# Patient Record
Sex: FEMALE | Race: WHITE | Employment: OTHER | URBAN - METROPOLITAN AREA
[De-identification: names, ages, dates, MRNs, and addresses within clinical notes are randomized per-mention and may not be internally consistent; named-entity substitution may affect disease eponyms.]

---

## 2017-01-20 RX ORDER — TRAMADOL HYDROCHLORIDE 50 MG/1
TABLET ORAL
Qty: 100 TABLET | Refills: 1 | Status: SHIPPED | OUTPATIENT
Start: 2017-01-20 | End: 2017-04-02 | Stop reason: SDUPTHER

## 2017-01-31 ENCOUNTER — TELEPHONE (OUTPATIENT)
Dept: FAMILY MEDICINE CLINIC | Age: 82
End: 2017-01-31

## 2017-01-31 DIAGNOSIS — K21.9 GASTROESOPHAGEAL REFLUX DISEASE WITHOUT ESOPHAGITIS: Primary | ICD-10-CM

## 2017-02-01 RX ORDER — OMEPRAZOLE 40 MG/1
40 CAPSULE, DELAYED RELEASE ORAL DAILY
Qty: 90 CAPSULE | Refills: 2 | Status: SHIPPED | OUTPATIENT
Start: 2017-02-01 | End: 2017-11-07 | Stop reason: SDUPTHER

## 2017-02-23 DIAGNOSIS — E03.4 HYPOTHYROIDISM DUE TO ACQUIRED ATROPHY OF THYROID: ICD-10-CM

## 2017-02-25 RX ORDER — LEVOTHYROXINE SODIUM 0.05 MG/1
TABLET ORAL
Qty: 90 TABLET | Refills: 1 | Status: SHIPPED | OUTPATIENT
Start: 2017-02-25 | End: 2017-08-22 | Stop reason: SDUPTHER

## 2017-03-13 ENCOUNTER — OFFICE VISIT (OUTPATIENT)
Dept: FAMILY MEDICINE CLINIC | Age: 82
End: 2017-03-13

## 2017-03-13 VITALS
WEIGHT: 110.13 LBS | SYSTOLIC BLOOD PRESSURE: 130 MMHG | RESPIRATION RATE: 12 BRPM | HEART RATE: 76 BPM | BODY MASS INDEX: 19.51 KG/M2 | DIASTOLIC BLOOD PRESSURE: 74 MMHG | HEIGHT: 63 IN

## 2017-03-13 DIAGNOSIS — G60.9 IDIOPATHIC PERIPHERAL NEUROPATHY: ICD-10-CM

## 2017-03-13 DIAGNOSIS — E03.4 HYPOTHYROIDISM DUE TO ACQUIRED ATROPHY OF THYROID: ICD-10-CM

## 2017-03-13 DIAGNOSIS — N31.9 NEUROGENIC BLADDER DISORDER: ICD-10-CM

## 2017-03-13 DIAGNOSIS — M75.51 ACUTE SHOULDER BURSITIS, RIGHT: ICD-10-CM

## 2017-03-13 DIAGNOSIS — K21.9 GASTROESOPHAGEAL REFLUX DISEASE WITHOUT ESOPHAGITIS: Primary | ICD-10-CM

## 2017-03-13 PROCEDURE — 99213 OFFICE O/P EST LOW 20 MIN: CPT | Performed by: FAMILY MEDICINE

## 2017-03-13 PROCEDURE — 20610 DRAIN/INJ JOINT/BURSA W/O US: CPT | Performed by: FAMILY MEDICINE

## 2017-03-13 RX ORDER — LORAZEPAM 0.5 MG/1
0.5 TABLET ORAL 2 TIMES DAILY
Qty: 60 TABLET | Refills: 3 | Status: SHIPPED | OUTPATIENT
Start: 2017-03-13 | End: 2017-06-13

## 2017-03-13 ASSESSMENT — ENCOUNTER SYMPTOMS
NAUSEA: 0
SORE THROAT: 0
WHEEZING: 0
SHORTNESS OF BREATH: 0
CHEST TIGHTNESS: 0
CONSTIPATION: 0
ABDOMINAL PAIN: 0
COUGH: 0
EYE PAIN: 0

## 2017-03-13 ASSESSMENT — PATIENT HEALTH QUESTIONNAIRE - PHQ9
SUM OF ALL RESPONSES TO PHQ QUESTIONS 1-9: 0
SUM OF ALL RESPONSES TO PHQ9 QUESTIONS 1 & 2: 0
1. LITTLE INTEREST OR PLEASURE IN DOING THINGS: 0
2. FEELING DOWN, DEPRESSED OR HOPELESS: 0

## 2017-04-04 RX ORDER — TRAMADOL HYDROCHLORIDE 50 MG/1
TABLET ORAL
Qty: 100 TABLET | Refills: 1 | Status: SHIPPED | OUTPATIENT
Start: 2017-04-04 | End: 2017-06-05 | Stop reason: SDUPTHER

## 2017-04-07 DIAGNOSIS — N31.9 NEUROGENIC BLADDER DISORDER: ICD-10-CM

## 2017-04-13 DIAGNOSIS — M15.9 PRIMARY OSTEOARTHRITIS INVOLVING MULTIPLE JOINTS: ICD-10-CM

## 2017-04-14 RX ORDER — CELECOXIB 200 MG/1
CAPSULE ORAL
Qty: 90 CAPSULE | Refills: 1 | Status: SHIPPED | OUTPATIENT
Start: 2017-04-14 | End: 2017-11-07 | Stop reason: SDUPTHER

## 2017-05-01 RX ORDER — LORAZEPAM 0.5 MG/1
TABLET ORAL
Qty: 120 TABLET | Refills: 1 | Status: SHIPPED | OUTPATIENT
Start: 2017-05-01 | End: 2017-08-07 | Stop reason: SDUPTHER

## 2017-05-22 ENCOUNTER — TELEPHONE (OUTPATIENT)
Dept: FAMILY MEDICINE CLINIC | Age: 82
End: 2017-05-22

## 2017-06-05 DIAGNOSIS — F41.9 ANXIETY: ICD-10-CM

## 2017-06-06 RX ORDER — TRAMADOL HYDROCHLORIDE 50 MG/1
TABLET ORAL
Qty: 100 TABLET | Refills: 1 | Status: SHIPPED | OUTPATIENT
Start: 2017-06-06 | End: 2017-08-07 | Stop reason: SDUPTHER

## 2017-06-06 RX ORDER — CITALOPRAM 40 MG/1
20 TABLET ORAL DAILY
Qty: 45 TABLET | Refills: 1 | Status: SHIPPED | OUTPATIENT
Start: 2017-06-06 | End: 2017-11-07 | Stop reason: SDUPTHER

## 2017-06-13 ENCOUNTER — OFFICE VISIT (OUTPATIENT)
Dept: FAMILY MEDICINE CLINIC | Age: 82
End: 2017-06-13

## 2017-06-13 VITALS
DIASTOLIC BLOOD PRESSURE: 66 MMHG | BODY MASS INDEX: 19.87 KG/M2 | HEART RATE: 84 BPM | RESPIRATION RATE: 16 BRPM | WEIGHT: 112.13 LBS | SYSTOLIC BLOOD PRESSURE: 120 MMHG | HEIGHT: 63 IN

## 2017-06-13 DIAGNOSIS — E55.9 VITAMIN D DEFICIENCY: ICD-10-CM

## 2017-06-13 DIAGNOSIS — E03.4 HYPOTHYROIDISM DUE TO ACQUIRED ATROPHY OF THYROID: ICD-10-CM

## 2017-06-13 DIAGNOSIS — K21.9 GASTROESOPHAGEAL REFLUX DISEASE WITHOUT ESOPHAGITIS: Primary | ICD-10-CM

## 2017-06-13 DIAGNOSIS — E78.00 PURE HYPERCHOLESTEROLEMIA: ICD-10-CM

## 2017-06-13 DIAGNOSIS — M81.0 OSTEOPOROSIS: ICD-10-CM

## 2017-06-13 PROCEDURE — 99213 OFFICE O/P EST LOW 20 MIN: CPT | Performed by: FAMILY MEDICINE

## 2017-06-13 ASSESSMENT — ENCOUNTER SYMPTOMS
WHEEZING: 0
SHORTNESS OF BREATH: 0
CONSTIPATION: 0
COUGH: 0
SORE THROAT: 0
EYE PAIN: 0
ABDOMINAL PAIN: 0
CHOKING: 0
NAUSEA: 0
CHEST TIGHTNESS: 0

## 2017-06-19 ENCOUNTER — TELEPHONE (OUTPATIENT)
Dept: FAMILY MEDICINE CLINIC | Age: 82
End: 2017-06-19

## 2017-06-26 DIAGNOSIS — G60.9 IDIOPATHIC PERIPHERAL NEUROPATHY: ICD-10-CM

## 2017-06-27 RX ORDER — GABAPENTIN 100 MG/1
CAPSULE ORAL
Qty: 180 CAPSULE | Refills: 1 | Status: SHIPPED | OUTPATIENT
Start: 2017-06-27 | End: 2017-12-21 | Stop reason: DRUGHIGH

## 2017-06-28 ENCOUNTER — OFFICE VISIT (OUTPATIENT)
Dept: FAMILY MEDICINE CLINIC | Age: 82
End: 2017-06-28

## 2017-06-28 VITALS
HEIGHT: 63 IN | DIASTOLIC BLOOD PRESSURE: 82 MMHG | RESPIRATION RATE: 12 BRPM | HEART RATE: 72 BPM | SYSTOLIC BLOOD PRESSURE: 140 MMHG | BODY MASS INDEX: 20.2 KG/M2 | WEIGHT: 114 LBS

## 2017-06-28 DIAGNOSIS — M54.50 ACUTE RIGHT-SIDED LOW BACK PAIN WITHOUT SCIATICA: Primary | ICD-10-CM

## 2017-06-28 PROCEDURE — 99213 OFFICE O/P EST LOW 20 MIN: CPT | Performed by: FAMILY MEDICINE

## 2017-06-28 RX ORDER — TIZANIDINE 4 MG/1
4 TABLET ORAL EVERY 6 HOURS PRN
Qty: 30 TABLET | Refills: 1 | Status: SHIPPED | OUTPATIENT
Start: 2017-06-28 | End: 2017-08-07 | Stop reason: ALTCHOICE

## 2017-06-28 RX ORDER — PREDNISONE 20 MG/1
TABLET ORAL
Qty: 21 TABLET | Refills: 0 | Status: SHIPPED | OUTPATIENT
Start: 2017-06-28 | End: 2017-08-07 | Stop reason: ALTCHOICE

## 2017-06-28 ASSESSMENT — ENCOUNTER SYMPTOMS
SHORTNESS OF BREATH: 0
ABDOMINAL PAIN: 0
NAUSEA: 0
CONSTIPATION: 0
COUGH: 0
SORE THROAT: 0
CHEST TIGHTNESS: 0
WHEEZING: 0
EYE PAIN: 0

## 2017-08-07 ENCOUNTER — OFFICE VISIT (OUTPATIENT)
Dept: FAMILY MEDICINE CLINIC | Age: 82
End: 2017-08-07

## 2017-08-07 VITALS
BODY MASS INDEX: 20.2 KG/M2 | HEIGHT: 63 IN | RESPIRATION RATE: 16 BRPM | DIASTOLIC BLOOD PRESSURE: 50 MMHG | WEIGHT: 114 LBS | SYSTOLIC BLOOD PRESSURE: 100 MMHG | HEART RATE: 64 BPM

## 2017-08-07 DIAGNOSIS — F41.9 ANXIETY: Primary | ICD-10-CM

## 2017-08-07 DIAGNOSIS — S76.011A HIP STRAIN, RIGHT, INITIAL ENCOUNTER: ICD-10-CM

## 2017-08-07 PROCEDURE — 99213 OFFICE O/P EST LOW 20 MIN: CPT | Performed by: FAMILY MEDICINE

## 2017-08-07 RX ORDER — TRAMADOL HYDROCHLORIDE 50 MG/1
TABLET ORAL
Qty: 100 TABLET | Refills: 0 | Status: SHIPPED | OUTPATIENT
Start: 2017-08-07 | End: 2017-08-30 | Stop reason: SDUPTHER

## 2017-08-07 RX ORDER — LORAZEPAM 0.5 MG/1
TABLET ORAL
Qty: 120 TABLET | Refills: 0 | Status: SHIPPED | OUTPATIENT
Start: 2017-08-07 | End: 2017-10-02 | Stop reason: SDUPTHER

## 2017-08-07 ASSESSMENT — ENCOUNTER SYMPTOMS
SINUS PRESSURE: 0
SHORTNESS OF BREATH: 0
BACK PAIN: 1
CONSTIPATION: 0

## 2017-08-14 ENCOUNTER — OFFICE VISIT (OUTPATIENT)
Dept: FAMILY MEDICINE CLINIC | Age: 82
End: 2017-08-14

## 2017-08-14 VITALS
HEART RATE: 72 BPM | WEIGHT: 115.13 LBS | BODY MASS INDEX: 20.4 KG/M2 | RESPIRATION RATE: 12 BRPM | SYSTOLIC BLOOD PRESSURE: 130 MMHG | HEIGHT: 63 IN | DIASTOLIC BLOOD PRESSURE: 72 MMHG

## 2017-08-14 DIAGNOSIS — M25.551 ACUTE RIGHT HIP PAIN: Primary | ICD-10-CM

## 2017-08-14 DIAGNOSIS — M54.50 ACUTE RIGHT-SIDED LOW BACK PAIN WITHOUT SCIATICA: ICD-10-CM

## 2017-08-14 PROCEDURE — 99213 OFFICE O/P EST LOW 20 MIN: CPT | Performed by: FAMILY MEDICINE

## 2017-08-14 RX ORDER — PREDNISONE 20 MG/1
TABLET ORAL
Qty: 21 TABLET | Refills: 0 | Status: SHIPPED | OUTPATIENT
Start: 2017-08-14 | End: 2017-08-15 | Stop reason: SDUPTHER

## 2017-08-14 ASSESSMENT — ENCOUNTER SYMPTOMS
NAUSEA: 0
SORE THROAT: 0
WHEEZING: 0
CHEST TIGHTNESS: 0
BACK PAIN: 1
ABDOMINAL PAIN: 0
COUGH: 0
EYE PAIN: 0
CONSTIPATION: 0
SHORTNESS OF BREATH: 0

## 2017-08-15 DIAGNOSIS — M25.551 ACUTE RIGHT HIP PAIN: ICD-10-CM

## 2017-08-15 DIAGNOSIS — M54.50 ACUTE RIGHT-SIDED LOW BACK PAIN WITHOUT SCIATICA: ICD-10-CM

## 2017-08-16 RX ORDER — PREDNISONE 20 MG/1
TABLET ORAL
Qty: 9 TABLET | Refills: 0 | Status: SHIPPED | OUTPATIENT
Start: 2017-08-16 | End: 2017-11-07

## 2017-08-22 DIAGNOSIS — E03.4 HYPOTHYROIDISM DUE TO ACQUIRED ATROPHY OF THYROID: ICD-10-CM

## 2017-08-23 RX ORDER — LEVOTHYROXINE SODIUM 0.05 MG/1
TABLET ORAL
Qty: 90 TABLET | Refills: 1 | Status: SHIPPED | OUTPATIENT
Start: 2017-08-23 | End: 2018-02-22 | Stop reason: SDUPTHER

## 2017-08-30 ENCOUNTER — HOSPITAL ENCOUNTER (OUTPATIENT)
Dept: WOMENS IMAGING | Age: 82
Discharge: HOME | End: 2017-08-30
Payer: MEDICARE

## 2017-08-30 DIAGNOSIS — Z12.31 VISIT FOR SCREENING MAMMOGRAM: ICD-10-CM

## 2017-08-30 DIAGNOSIS — S76.011A HIP STRAIN, RIGHT, INITIAL ENCOUNTER: ICD-10-CM

## 2017-08-30 PROCEDURE — G0202 SCR MAMMO BI INCL CAD: HCPCS

## 2017-08-31 RX ORDER — TRAMADOL HYDROCHLORIDE 50 MG/1
TABLET ORAL
Qty: 100 TABLET | Refills: 0 | Status: SHIPPED | OUTPATIENT
Start: 2017-08-31 | End: 2017-10-02 | Stop reason: SDUPTHER

## 2017-09-07 ENCOUNTER — HOSPITAL ENCOUNTER (OUTPATIENT)
Dept: WOMENS IMAGING | Age: 82
End: 2017-09-07
Payer: MEDICARE

## 2017-09-07 ENCOUNTER — HOSPITAL ENCOUNTER (OUTPATIENT)
Dept: WOMENS IMAGING | Age: 82
Discharge: HOME | End: 2017-09-07
Payer: MEDICARE

## 2017-09-07 DIAGNOSIS — R92.2 BREAST DENSITY: ICD-10-CM

## 2017-09-07 PROCEDURE — G0206 DX MAMMO INCL CAD UNI: HCPCS

## 2017-09-12 ENCOUNTER — HOSPITAL ENCOUNTER (OUTPATIENT)
Age: 82
Discharge: HOME | End: 2017-09-12
Payer: MEDICARE

## 2017-09-12 ENCOUNTER — OFFICE VISIT (OUTPATIENT)
Dept: FAMILY MEDICINE CLINIC | Age: 82
End: 2017-09-12

## 2017-09-12 ENCOUNTER — HOSPITAL ENCOUNTER (OUTPATIENT)
Dept: GENERAL RADIOLOGY | Age: 82
Discharge: HOME | End: 2017-09-12
Payer: MEDICARE

## 2017-09-12 VITALS
SYSTOLIC BLOOD PRESSURE: 130 MMHG | HEART RATE: 68 BPM | RESPIRATION RATE: 16 BRPM | HEIGHT: 63 IN | BODY MASS INDEX: 20.91 KG/M2 | DIASTOLIC BLOOD PRESSURE: 70 MMHG | WEIGHT: 118 LBS

## 2017-09-12 DIAGNOSIS — M70.61 TROCHANTERIC BURSITIS OF RIGHT HIP: Primary | ICD-10-CM

## 2017-09-12 DIAGNOSIS — M70.61 TROCHANTERIC BURSITIS OF RIGHT HIP: ICD-10-CM

## 2017-09-12 PROCEDURE — 99213 OFFICE O/P EST LOW 20 MIN: CPT | Performed by: FAMILY MEDICINE

## 2017-09-12 PROCEDURE — 73502 X-RAY EXAM HIP UNI 2-3 VIEWS: CPT

## 2017-09-12 RX ORDER — PREDNISONE 20 MG/1
TABLET ORAL
Qty: 15 TABLET | Refills: 0 | Status: SHIPPED | OUTPATIENT
Start: 2017-09-12 | End: 2017-11-07

## 2017-09-12 ASSESSMENT — ENCOUNTER SYMPTOMS
NAUSEA: 0
EYE PAIN: 0
CHEST TIGHTNESS: 0
WHEEZING: 0
SORE THROAT: 0
COUGH: 0
SHORTNESS OF BREATH: 0
ABDOMINAL PAIN: 0
CONSTIPATION: 0
BACK PAIN: 1

## 2017-09-13 ENCOUNTER — TELEPHONE (OUTPATIENT)
Dept: FAMILY MEDICINE CLINIC | Age: 82
End: 2017-09-13

## 2017-10-02 DIAGNOSIS — F41.9 ANXIETY: ICD-10-CM

## 2017-10-02 DIAGNOSIS — S76.011A HIP STRAIN, RIGHT, INITIAL ENCOUNTER: ICD-10-CM

## 2017-10-02 NOTE — TELEPHONE ENCOUNTER
Pt called for a refill of:    LORazepam (ATIVAN) 0.5 MG tablet tid  traMADol (ULTRAM) 50 MG tablet take 1 to 2 tablets by mouth every 6 hours if needed for pain    Send 90 day supply to [facility]

## 2017-10-03 RX ORDER — TRAMADOL HYDROCHLORIDE 50 MG/1
TABLET ORAL
Qty: 100 TABLET | Refills: 0 | Status: SHIPPED | OUTPATIENT
Start: 2017-10-03 | End: 2017-10-24 | Stop reason: SDUPTHER

## 2017-10-03 RX ORDER — LORAZEPAM 0.5 MG/1
TABLET ORAL
Qty: 120 TABLET | Refills: 0 | Status: SHIPPED | OUTPATIENT
Start: 2017-10-03 | End: 2017-12-18 | Stop reason: SDUPTHER

## 2017-10-24 DIAGNOSIS — S76.011A HIP STRAIN, RIGHT, INITIAL ENCOUNTER: ICD-10-CM

## 2017-10-24 NOTE — TELEPHONE ENCOUNTER
Pt called requesting a Rx for Tramadol 50 mg take 1 - 2 tablets every 6 hours    [facility])

## 2017-10-25 RX ORDER — TRAMADOL HYDROCHLORIDE 50 MG/1
TABLET ORAL
Qty: 100 TABLET | Refills: 0 | Status: SHIPPED | OUTPATIENT
Start: 2017-10-25 | End: 2017-11-20 | Stop reason: SDUPTHER

## 2017-11-07 ENCOUNTER — OFFICE VISIT (OUTPATIENT)
Dept: FAMILY MEDICINE CLINIC | Age: 82
End: 2017-11-07

## 2017-11-07 VITALS
DIASTOLIC BLOOD PRESSURE: 66 MMHG | WEIGHT: 111.13 LBS | HEIGHT: 63 IN | BODY MASS INDEX: 19.69 KG/M2 | HEART RATE: 68 BPM | RESPIRATION RATE: 14 BRPM | SYSTOLIC BLOOD PRESSURE: 110 MMHG

## 2017-11-07 DIAGNOSIS — Z23 NEED FOR INFLUENZA VACCINATION: ICD-10-CM

## 2017-11-07 DIAGNOSIS — M70.61 TROCHANTERIC BURSITIS OF RIGHT HIP: ICD-10-CM

## 2017-11-07 DIAGNOSIS — G89.29 CHRONIC MIDLINE LOW BACK PAIN WITH RIGHT-SIDED SCIATICA: ICD-10-CM

## 2017-11-07 DIAGNOSIS — M81.0 AGE-RELATED OSTEOPOROSIS WITHOUT CURRENT PATHOLOGICAL FRACTURE: ICD-10-CM

## 2017-11-07 DIAGNOSIS — E03.4 HYPOTHYROIDISM DUE TO ACQUIRED ATROPHY OF THYROID: ICD-10-CM

## 2017-11-07 DIAGNOSIS — F41.9 ANXIETY: ICD-10-CM

## 2017-11-07 DIAGNOSIS — K21.9 GASTROESOPHAGEAL REFLUX DISEASE WITHOUT ESOPHAGITIS: Primary | ICD-10-CM

## 2017-11-07 DIAGNOSIS — M54.41 CHRONIC MIDLINE LOW BACK PAIN WITH RIGHT-SIDED SCIATICA: ICD-10-CM

## 2017-11-07 DIAGNOSIS — M15.9 PRIMARY OSTEOARTHRITIS INVOLVING MULTIPLE JOINTS: ICD-10-CM

## 2017-11-07 DIAGNOSIS — E55.9 VITAMIN D DEFICIENCY: ICD-10-CM

## 2017-11-07 PROCEDURE — G0008 ADMIN INFLUENZA VIRUS VAC: HCPCS | Performed by: FAMILY MEDICINE

## 2017-11-07 PROCEDURE — 99213 OFFICE O/P EST LOW 20 MIN: CPT | Performed by: FAMILY MEDICINE

## 2017-11-07 RX ORDER — CITALOPRAM 40 MG/1
20 TABLET ORAL DAILY
Qty: 45 TABLET | Refills: 1 | Status: SHIPPED | OUTPATIENT
Start: 2017-11-07 | End: 2018-05-31 | Stop reason: SDUPTHER

## 2017-11-07 RX ORDER — CELECOXIB 200 MG/1
CAPSULE ORAL
Qty: 90 CAPSULE | Refills: 1 | Status: SHIPPED | OUTPATIENT
Start: 2017-11-07 | End: 2017-12-21 | Stop reason: ALTCHOICE

## 2017-11-07 RX ORDER — OMEPRAZOLE 40 MG/1
40 CAPSULE, DELAYED RELEASE ORAL DAILY
Qty: 90 CAPSULE | Refills: 1 | Status: SHIPPED | OUTPATIENT
Start: 2017-11-07 | End: 2018-04-03

## 2017-11-07 ASSESSMENT — ENCOUNTER SYMPTOMS
ABDOMINAL PAIN: 0
NAUSEA: 0
CHEST TIGHTNESS: 0
CONSTIPATION: 0
SORE THROAT: 0
EYE PAIN: 0
SHORTNESS OF BREATH: 0
WHEEZING: 0
COUGH: 0

## 2017-11-07 ASSESSMENT — PATIENT HEALTH QUESTIONNAIRE - PHQ9
SUM OF ALL RESPONSES TO PHQ9 QUESTIONS 1 & 2: 0
SUM OF ALL RESPONSES TO PHQ QUESTIONS 1-9: 0
1. LITTLE INTEREST OR PLEASURE IN DOING THINGS: 0
2. FEELING DOWN, DEPRESSED OR HOPELESS: 0

## 2017-11-07 NOTE — PROGRESS NOTES
Subjective:      Patient ID: Jeannie Kayser is a 80 y.o. female. Back and  Hip  Pain and  Hip  Trochanteric    Bursitis   Tramadol  2  In am and oneafternoon and  One  bedtiome       Osteoporosis  prolia      bladder  Stable                Hyperlipidemia   This is a chronic problem. The current episode started more than 1 year ago. The problem is controlled. Pertinent negatives include no chest pain or shortness of breath. Current antihyperlipidemic treatment includes statins. The current treatment provides significant improvement of lipids. There are no compliance problems. Gastroesophageal Reflux   She reports no abdominal pain, no chest pain, no coughing, no early satiety, no nausea, no sore throat or no wheezing. This is a chronic problem. The problem occurs rarely. The problem has been resolved. Nothing aggravates the symptoms. Pertinent negatives include no fatigue. The treatment provided significant relief. Past Medical History:   Diagnosis Date    Alcohol abuse sober since Dorsey Eisenmenger polyp Dr. Bryon Meres    Depression     GERD (gastroesophageal reflux disease)     PUD 1998 Dr. Mansi Singh    Hyperlipidemia     Hypothyroid     Neuropathy, peripheral ([address]     Osteoarthritis     Osteoporosis     Thyroid disease     hypothyroid    Vitamin D deficiency      Review of Systems   Constitutional: Negative for appetite change, fatigue and fever. HENT: Negative for congestion, ear pain, postnasal drip and sore throat. Eyes: Negative for pain and visual disturbance. Respiratory: Negative for cough, chest tightness, shortness of breath and wheezing. Cardiovascular: Negative for chest pain, palpitations and leg swelling. Gastrointestinal: Negative for abdominal pain, constipation and nausea. Genitourinary: Negative for dysuria and frequency. Musculoskeletal: Negative for arthralgias, joint swelling, neck pain and neck stiffness. Skin: Negative for rash. Neurological: Negative for dizziness, weakness, numbness and headaches. Hematological: Negative for adenopathy. Does not bruise/bleed easily. Psychiatric/Behavioral: Negative for behavioral problems and sleep disturbance. The patient is not nervous/anxious. /66 (Site: Right Arm, Position: Sitting, Cuff Size: Medium Adult)   Pulse 68   Resp 14   Ht 5' 3\" (1.6 m)   Wt 111 lb 2 oz (50.4 kg)   Breastfeeding? No   BMI 19.68 kg/m²   Objective:   Physical Exam   Constitutional: She is oriented to person, place, and time. She appears well-developed and well-nourished. HENT:   Head: Normocephalic and atraumatic. Right Ear: External ear normal.   Left Ear: External ear normal.   Nose: Nose normal.   Mouth/Throat: Oropharynx is clear and moist.   Eyes: Conjunctivae and EOM are normal. Pupils are equal, round, and reactive to light. No scleral icterus. Neck: Normal range of motion. Neck supple. No JVD present. No thyromegaly present. Cardiovascular: Normal rate, regular rhythm, normal heart sounds and intact distal pulses. Pulmonary/Chest: Effort normal and breath sounds normal. She has no wheezes. She has no rales. Abdominal: Soft. Bowel sounds are normal. She exhibits no distension and no mass. There is no tenderness. Musculoskeletal: Normal range of motion. She exhibits no tenderness. Low  Back and    right hip pain   Lymphadenopathy:     She has no cervical adenopathy. Neurological: She is alert and oriented to person, place, and time. She has normal reflexes. No cranial nerve deficit. Skin: Skin is warm and dry. No rash noted. Psychiatric: She has a normal mood and affect. Nursing note and vitals reviewed. Assessment:      1. Gastroesophageal reflux disease without esophagitis  omeprazole (PRILOSEC) 40 MG delayed release capsule   2. Primary osteoarthritis involving multiple joints  celecoxib (CELEBREX) 200 MG capsule   3. Anxiety  citalopram (CELEXA) 40 MG tablet   4.

## 2017-11-16 ENCOUNTER — TELEPHONE (OUTPATIENT)
Dept: FAMILY MEDICINE CLINIC | Age: 82
End: 2017-11-16

## 2017-11-16 DIAGNOSIS — M81.0 AGE-RELATED OSTEOPOROSIS WITHOUT CURRENT PATHOLOGICAL FRACTURE: ICD-10-CM

## 2017-11-16 NOTE — TELEPHONE ENCOUNTER
Patient called stating Dr Winnie Miller ordered Prolia and advised her we would contact her when this was scheduled at [facility]. She is unable to do it on 11/21/17.

## 2017-11-20 ENCOUNTER — HOSPITAL ENCOUNTER (OUTPATIENT)
Dept: NURSING | Age: 82
Discharge: HOME | End: 2017-11-20
Payer: MEDICARE

## 2017-11-20 VITALS
SYSTOLIC BLOOD PRESSURE: 130 MMHG | DIASTOLIC BLOOD PRESSURE: 75 MMHG | RESPIRATION RATE: 16 BRPM | TEMPERATURE: 96.4 F | HEART RATE: 80 BPM

## 2017-11-20 DIAGNOSIS — G89.29 CHRONIC MIDLINE LOW BACK PAIN WITHOUT SCIATICA: ICD-10-CM

## 2017-11-20 DIAGNOSIS — M54.50 CHRONIC MIDLINE LOW BACK PAIN WITHOUT SCIATICA: ICD-10-CM

## 2017-11-20 DIAGNOSIS — S76.011A HIP STRAIN, RIGHT, INITIAL ENCOUNTER: ICD-10-CM

## 2017-11-20 DIAGNOSIS — M15.9 PRIMARY OSTEOARTHRITIS INVOLVING MULTIPLE JOINTS: Primary | ICD-10-CM

## 2017-11-20 DIAGNOSIS — M81.0 AGE-RELATED OSTEOPOROSIS WITHOUT CURRENT PATHOLOGICAL FRACTURE: ICD-10-CM

## 2017-11-20 NOTE — PROGRESS NOTES
11:12 AM PT ADMITTED TO [facility] FOR PROLIA INJECTION  11:12 AM PATIENT RIGHTS AND RESPONSIBILITIES SHEET OFFERED TO PT TO READ.  11:22 AM TOO EARLY FOR PROLIA INJECTION-PT RESCHEDULED FOR 11-27-17  11:22 AM WRITTEN DISCHARGE INSTRUCTIONS GIVEN TO PT-VERBALIZES UNDERSTANDING.  11:22 AM PT DISCHARGED AMBULATORY IN SATISFACTORY CONDITION.     _M___ Safety:       (Environmental)   [place] to environment   Ensure ID band is correct and in place/ allergy band as needed   Assess for fall risk   Initiate fall precautions as applicable (fall band, side rails, etc.)   Call light within reach   Bed in low position/ wheels locked    M____ Pain:        Assess pain level and characteristics   Administer analgesics as ordered   Assess effectiveness of pain management and report to MD as needed    _M___ Knowledge Deficit:   Assess baseline knowledge   Provide teaching at level of understanding   Provide teaching via preferred learning method   Evaluate teaching effectiveness

## 2017-11-21 PROBLEM — M54.50 CHRONIC MIDLINE LOW BACK PAIN WITHOUT SCIATICA: Status: ACTIVE | Noted: 2017-11-21

## 2017-11-21 PROBLEM — G89.29 CHRONIC MIDLINE LOW BACK PAIN WITHOUT SCIATICA: Status: ACTIVE | Noted: 2017-11-21

## 2017-11-21 RX ORDER — TRAMADOL HYDROCHLORIDE 50 MG/1
TABLET ORAL
Qty: 100 TABLET | Refills: 0 | Status: ON HOLD | OUTPATIENT
Start: 2017-11-21 | End: 2017-12-07 | Stop reason: HOSPADM

## 2017-11-27 ENCOUNTER — HOSPITAL ENCOUNTER (OUTPATIENT)
Dept: NURSING | Age: 82
Discharge: HOME | End: 2017-11-27
Payer: MEDICARE

## 2017-11-27 VITALS
SYSTOLIC BLOOD PRESSURE: 138 MMHG | HEART RATE: 72 BPM | RESPIRATION RATE: 16 BRPM | DIASTOLIC BLOOD PRESSURE: 66 MMHG | TEMPERATURE: 96.9 F

## 2017-11-27 DIAGNOSIS — M81.0 AGE-RELATED OSTEOPOROSIS WITHOUT CURRENT PATHOLOGICAL FRACTURE: ICD-10-CM

## 2017-11-27 PROCEDURE — 96372 THER/PROPH/DIAG INJ SC/IM: CPT

## 2017-11-27 PROCEDURE — 6360000002 HC RX W HCPCS: Performed by: FAMILY MEDICINE

## 2017-11-27 RX ADMIN — DENOSUMAB 60 MG: 60 INJECTION SUBCUTANEOUS at 11:03

## 2017-11-27 NOTE — PROGRESS NOTES
10:44 AM PT ADMITTED TO [facility] FOR PROLIA INJECTION  10:44 AM PATIENT RIGHTS AND RESPONSIBILITIES SHEET OFFERED TO PT TO READ.   11:05 AM PT TOLERATED INJECTION WELL  11:05 AM WRITTEN DISCHARGE INSTRUCTIONS GIVEN TO PT-VERBALIZES UNDERSTANDING  11:05 AM PT DISCHARGED AMBULATORY IN SATISFACTORY CONDITION.    __M__ Safety:       (Environmental)   [place] to environment   Ensure ID band is correct and in place/ allergy band as needed   Assess for fall risk   Initiate fall precautions as applicable (fall band, side rails, etc.)   Call light within reach   Bed in low position/ wheels locked    __M__ Pain:        Assess pain level and characteristics   Administer analgesics as ordered   Assess effectiveness of pain management and report to MD as needed    _M___ Knowledge Deficit:   Assess baseline knowledge   Provide teaching at level of understanding   Provide teaching via preferred learning method   Evaluate teaching effectiveness    _

## 2017-11-29 ENCOUNTER — APPOINTMENT (OUTPATIENT)
Dept: CT IMAGING | Age: 82
DRG: 327 | End: 2017-11-29
Payer: MEDICARE

## 2017-11-29 ENCOUNTER — ANESTHESIA EVENT (OUTPATIENT)
Dept: OPERATING ROOM | Age: 82
DRG: 327 | End: 2017-11-29
Payer: MEDICARE

## 2017-11-29 ENCOUNTER — APPOINTMENT (OUTPATIENT)
Dept: GENERAL RADIOLOGY | Age: 82
DRG: 327 | End: 2017-11-29
Payer: MEDICARE

## 2017-11-29 ENCOUNTER — HOSPITAL ENCOUNTER (INPATIENT)
Age: 82
LOS: 8 days | Discharge: HOME HEALTH | DRG: 327 | End: 2017-12-07
Attending: SURGERY | Admitting: SURGERY
Payer: MEDICARE

## 2017-11-29 ENCOUNTER — ANESTHESIA (OUTPATIENT)
Dept: OPERATING ROOM | Age: 82
DRG: 327 | End: 2017-11-29
Payer: MEDICARE

## 2017-11-29 VITALS
DIASTOLIC BLOOD PRESSURE: 88 MMHG | RESPIRATION RATE: 10 BRPM | SYSTOLIC BLOOD PRESSURE: 157 MMHG | OXYGEN SATURATION: 100 %

## 2017-11-29 DIAGNOSIS — K66.8 PNEUMOPERITONEUM: Primary | ICD-10-CM

## 2017-11-29 DIAGNOSIS — R19.7 NAUSEA VOMITING AND DIARRHEA: ICD-10-CM

## 2017-11-29 DIAGNOSIS — R53.81 PHYSICAL DECONDITIONING: ICD-10-CM

## 2017-11-29 DIAGNOSIS — R11.2 NAUSEA VOMITING AND DIARRHEA: ICD-10-CM

## 2017-11-29 LAB
ALBUMIN SERPL-MCNC: 3.6 G/DL (ref 3.5–5.1)
ALP BLD-CCNC: 54 U/L (ref 38–126)
ALT SERPL-CCNC: 13 U/L (ref 11–66)
ANION GAP SERPL CALCULATED.3IONS-SCNC: 11 MEQ/L (ref 8–16)
ANISOCYTOSIS: ABNORMAL
AST SERPL-CCNC: 23 U/L (ref 5–40)
BACTERIA: ABNORMAL /HPF
BASOPHILS # BLD: 0.2 %
BASOPHILS ABSOLUTE: 0 THOU/MM3 (ref 0–0.1)
BILIRUB SERPL-MCNC: 0.3 MG/DL (ref 0.3–1.2)
BILIRUBIN URINE: NEGATIVE
BLOOD, URINE: NEGATIVE
BUN BLDV-MCNC: 28 MG/DL (ref 7–22)
CALCIUM SERPL-MCNC: 8.6 MG/DL (ref 8.5–10.5)
CASTS 2: ABNORMAL /LPF
CASTS UA: ABNORMAL /LPF
CHARACTER, URINE: CLEAR
CHLORIDE BLD-SCNC: 103 MEQ/L (ref 98–111)
CO2: 25 MEQ/L (ref 23–33)
COLOR: YELLOW
CREAT SERPL-MCNC: 1 MG/DL (ref 0.4–1.2)
CRYSTALS, UA: ABNORMAL
EKG ATRIAL RATE: 99 BPM
EKG P AXIS: 45 DEGREES
EKG P-R INTERVAL: 154 MS
EKG Q-T INTERVAL: 374 MS
EKG QRS DURATION: 76 MS
EKG QTC CALCULATION (BAZETT): 479 MS
EKG R AXIS: -48 DEGREES
EKG T AXIS: 80 DEGREES
EKG VENTRICULAR RATE: 99 BPM
EOSINOPHIL # BLD: 0 %
EOSINOPHILS ABSOLUTE: 0 THOU/MM3 (ref 0–0.4)
EPITHELIAL CELLS, UA: ABNORMAL /HPF
GFR SERPL CREATININE-BSD FRML MDRD: 53 ML/MIN/1.73M2
GLUCOSE BLD-MCNC: 121 MG/DL (ref 70–108)
GLUCOSE URINE: NEGATIVE MG/DL
HCT VFR BLD CALC: 31 % (ref 37–47)
HEMOGLOBIN: 10.2 GM/DL (ref 12–16)
KETONES, URINE: 15
LACTIC ACID: 1.1 MMOL/L (ref 0.5–2.2)
LEUKOCYTE ESTERASE, URINE: NEGATIVE
LYMPHOCYTES # BLD: 5.6 %
LYMPHOCYTES ABSOLUTE: 0.4 THOU/MM3 (ref 1–4.8)
MCH RBC QN AUTO: 30.1 PG (ref 27–31)
MCHC RBC AUTO-ENTMCNC: 33 GM/DL (ref 33–37)
MCV RBC AUTO: 91.4 FL (ref 81–99)
MISCELLANEOUS 2: ABNORMAL
MONOCYTES # BLD: 4 %
MONOCYTES ABSOLUTE: 0.3 THOU/MM3 (ref 0.4–1.3)
NITRITE, URINE: POSITIVE
NUCLEATED RED BLOOD CELLS: 0 /100 WBC
OSMOLALITY CALCULATION: 284.3 MOSMOL/KG (ref 275–300)
PDW BLD-RTO: 15.1 % (ref 11.5–14.5)
PH UA: 6
PLATELET # BLD: 229 THOU/MM3 (ref 130–400)
PMV BLD AUTO: 7.9 MCM (ref 7.4–10.4)
POTASSIUM SERPL-SCNC: 4.4 MEQ/L (ref 3.5–5.2)
PROTEIN UA: NEGATIVE
RBC # BLD: 3.39 MILL/MM3 (ref 4.2–5.4)
RBC URINE: ABNORMAL /HPF
RENAL EPITHELIAL, UA: ABNORMAL
SEG NEUTROPHILS: 90.2 %
SEGMENTED NEUTROPHILS ABSOLUTE COUNT: 5.9 THOU/MM3 (ref 1.8–7.7)
SODIUM BLD-SCNC: 139 MEQ/L (ref 135–145)
SPECIFIC GRAVITY, URINE: > 1.03 (ref 1–1.03)
TOTAL PROTEIN: 6.1 G/DL (ref 6.1–8)
TROPONIN T: < 0.01 NG/ML
UROBILINOGEN, URINE: 0.2 EU/DL
WBC # BLD: 6.5 THOU/MM3 (ref 4.8–10.8)
WBC UA: ABNORMAL /HPF
YEAST: ABNORMAL

## 2017-11-29 PROCEDURE — 83605 ASSAY OF LACTIC ACID: CPT

## 2017-11-29 PROCEDURE — 6360000002 HC RX W HCPCS: Performed by: NURSE ANESTHETIST, CERTIFIED REGISTERED

## 2017-11-29 PROCEDURE — 3700000001 HC ADD 15 MINUTES (ANESTHESIA): Performed by: SURGERY

## 2017-11-29 PROCEDURE — 96361 HYDRATE IV INFUSION ADD-ON: CPT

## 2017-11-29 PROCEDURE — 96374 THER/PROPH/DIAG INJ IV PUSH: CPT

## 2017-11-29 PROCEDURE — A6252 ABSORPT DRG >16 <=48 W/O BDR: HCPCS | Performed by: SURGERY

## 2017-11-29 PROCEDURE — 36415 COLL VENOUS BLD VENIPUNCTURE: CPT

## 2017-11-29 PROCEDURE — 2500000003 HC RX 250 WO HCPCS

## 2017-11-29 PROCEDURE — 6360000004 HC RX CONTRAST MEDICATION: Performed by: PHYSICIAN ASSISTANT

## 2017-11-29 PROCEDURE — 6360000002 HC RX W HCPCS: Performed by: PHYSICIAN ASSISTANT

## 2017-11-29 PROCEDURE — 2500000003 HC RX 250 WO HCPCS: Performed by: ANESTHESIOLOGY

## 2017-11-29 PROCEDURE — 7100000000 HC PACU RECOVERY - FIRST 15 MIN: Performed by: SURGERY

## 2017-11-29 PROCEDURE — 96375 TX/PRO/DX INJ NEW DRUG ADDON: CPT

## 2017-11-29 PROCEDURE — 2500000003 HC RX 250 WO HCPCS: Performed by: NURSE ANESTHETIST, CERTIFIED REGISTERED

## 2017-11-29 PROCEDURE — 0DQ60ZZ REPAIR STOMACH, OPEN APPROACH: ICD-10-PCS | Performed by: SURGERY

## 2017-11-29 PROCEDURE — 96376 TX/PRO/DX INJ SAME DRUG ADON: CPT

## 2017-11-29 PROCEDURE — 81001 URINALYSIS AUTO W/SCOPE: CPT

## 2017-11-29 PROCEDURE — 7100000001 HC PACU RECOVERY - ADDTL 15 MIN: Performed by: SURGERY

## 2017-11-29 PROCEDURE — 99222 1ST HOSP IP/OBS MODERATE 55: CPT | Performed by: SURGERY

## 2017-11-29 PROCEDURE — 85025 COMPLETE CBC W/AUTO DIFF WBC: CPT

## 2017-11-29 PROCEDURE — 87086 URINE CULTURE/COLONY COUNT: CPT

## 2017-11-29 PROCEDURE — 3600000004 HC SURGERY LEVEL 4 BASE: Performed by: SURGERY

## 2017-11-29 PROCEDURE — 74177 CT ABD & PELVIS W/CONTRAST: CPT

## 2017-11-29 PROCEDURE — 6360000002 HC RX W HCPCS: Performed by: ANESTHESIOLOGY

## 2017-11-29 PROCEDURE — 3600000014 HC SURGERY LEVEL 4 ADDTL 15MIN: Performed by: SURGERY

## 2017-11-29 PROCEDURE — 2580000003 HC RX 258: Performed by: PHYSICIAN ASSISTANT

## 2017-11-29 PROCEDURE — 3700000000 HC ANESTHESIA ATTENDED CARE: Performed by: SURGERY

## 2017-11-29 PROCEDURE — 2580000003 HC RX 258: Performed by: SURGERY

## 2017-11-29 PROCEDURE — 74022 RADEX COMPL AQT ABD SERIES: CPT

## 2017-11-29 PROCEDURE — 6360000002 HC RX W HCPCS

## 2017-11-29 PROCEDURE — 84484 ASSAY OF TROPONIN QUANT: CPT

## 2017-11-29 PROCEDURE — 80053 COMPREHEN METABOLIC PANEL: CPT

## 2017-11-29 PROCEDURE — 93005 ELECTROCARDIOGRAM TRACING: CPT

## 2017-11-29 PROCEDURE — 1200000000 HC SEMI PRIVATE

## 2017-11-29 PROCEDURE — A6252 ABSORPT DRG >16 <=48 W/O BDR: HCPCS

## 2017-11-29 PROCEDURE — 99285 EMERGENCY DEPT VISIT HI MDM: CPT

## 2017-11-29 RX ORDER — ANTIOX #8/OM3/DHA/EPA/LUT/ZEAX 250-2.5 MG
1 CAPSULE ORAL 2 TIMES DAILY
Status: DISCONTINUED | OUTPATIENT
Start: 2017-11-29 | End: 2017-12-07 | Stop reason: HOSPADM

## 2017-11-29 RX ORDER — SODIUM CHLORIDE 0.9 % (FLUSH) 0.9 %
10 SYRINGE (ML) INJECTION PRN
Status: DISCONTINUED | OUTPATIENT
Start: 2017-11-29 | End: 2017-12-07 | Stop reason: HOSPADM

## 2017-11-29 RX ORDER — KETAMINE HYDROCHLORIDE 50 MG/ML
INJECTION, SOLUTION, CONCENTRATE INTRAMUSCULAR; INTRAVENOUS PRN
Status: DISCONTINUED | OUTPATIENT
Start: 2017-11-29 | End: 2017-11-29 | Stop reason: SDUPTHER

## 2017-11-29 RX ORDER — SODIUM CHLORIDE 9 MG/ML
INJECTION, SOLUTION INTRAVENOUS CONTINUOUS
Status: DISCONTINUED | OUTPATIENT
Start: 2017-11-29 | End: 2017-12-06

## 2017-11-29 RX ORDER — SODIUM CHLORIDE 0.9 % (FLUSH) 0.9 %
10 SYRINGE (ML) INJECTION EVERY 12 HOURS SCHEDULED
Status: DISCONTINUED | OUTPATIENT
Start: 2017-11-29 | End: 2017-12-07 | Stop reason: HOSPADM

## 2017-11-29 RX ORDER — MORPHINE SULFATE 2 MG/ML
2 INJECTION, SOLUTION INTRAMUSCULAR; INTRAVENOUS EVERY 10 MIN PRN
Status: DISCONTINUED | OUTPATIENT
Start: 2017-11-29 | End: 2017-12-07 | Stop reason: HOSPADM

## 2017-11-29 RX ORDER — METOCLOPRAMIDE HYDROCHLORIDE 5 MG/ML
10 INJECTION INTRAMUSCULAR; INTRAVENOUS ONCE
Status: COMPLETED | OUTPATIENT
Start: 2017-11-29 | End: 2017-11-29

## 2017-11-29 RX ORDER — CIPROFLOXACIN 2 MG/ML
400 INJECTION, SOLUTION INTRAVENOUS EVERY 12 HOURS
Status: COMPLETED | OUTPATIENT
Start: 2017-11-30 | End: 2017-11-30

## 2017-11-29 RX ORDER — GLYCOPYRROLATE 0.2 MG/ML
INJECTION INTRAMUSCULAR; INTRAVENOUS PRN
Status: DISCONTINUED | OUTPATIENT
Start: 2017-11-29 | End: 2017-11-29 | Stop reason: SDUPTHER

## 2017-11-29 RX ORDER — ONDANSETRON 2 MG/ML
4 INJECTION INTRAMUSCULAR; INTRAVENOUS ONCE
Status: COMPLETED | OUTPATIENT
Start: 2017-11-29 | End: 2017-11-29

## 2017-11-29 RX ORDER — ONDANSETRON 2 MG/ML
4 INJECTION INTRAMUSCULAR; INTRAVENOUS EVERY 6 HOURS PRN
Status: DISCONTINUED | OUTPATIENT
Start: 2017-11-29 | End: 2017-12-07 | Stop reason: HOSPADM

## 2017-11-29 RX ORDER — MORPHINE SULFATE 2 MG/ML
INJECTION, SOLUTION INTRAMUSCULAR; INTRAVENOUS
Status: DISPENSED
Start: 2017-11-29 | End: 2017-11-30

## 2017-11-29 RX ORDER — SODIUM CHLORIDE 9 MG/ML
INJECTION, SOLUTION INTRAVENOUS CONTINUOUS
Status: DISCONTINUED | OUTPATIENT
Start: 2017-11-29 | End: 2017-11-29 | Stop reason: SDUPTHER

## 2017-11-29 RX ORDER — ACETAMINOPHEN 325 MG/1
650 TABLET ORAL EVERY 4 HOURS PRN
Status: DISCONTINUED | OUTPATIENT
Start: 2017-11-29 | End: 2017-12-07 | Stop reason: HOSPADM

## 2017-11-29 RX ORDER — DEXAMETHASONE SODIUM PHOSPHATE 4 MG/ML
INJECTION, SOLUTION INTRA-ARTICULAR; INTRALESIONAL; INTRAMUSCULAR; INTRAVENOUS; SOFT TISSUE PRN
Status: DISCONTINUED | OUTPATIENT
Start: 2017-11-29 | End: 2017-11-29 | Stop reason: SDUPTHER

## 2017-11-29 RX ORDER — PROMETHAZINE HYDROCHLORIDE 25 MG/ML
12.5 INJECTION, SOLUTION INTRAMUSCULAR; INTRAVENOUS
Status: DISCONTINUED | OUTPATIENT
Start: 2017-11-29 | End: 2017-11-29

## 2017-11-29 RX ORDER — MORPHINE SULFATE 2 MG/ML
2 INJECTION, SOLUTION INTRAMUSCULAR; INTRAVENOUS
Status: DISCONTINUED | OUTPATIENT
Start: 2017-11-29 | End: 2017-12-07 | Stop reason: HOSPADM

## 2017-11-29 RX ORDER — SUCCINYLCHOLINE CHLORIDE 20 MG/ML
INJECTION INTRAMUSCULAR; INTRAVENOUS PRN
Status: DISCONTINUED | OUTPATIENT
Start: 2017-11-29 | End: 2017-11-29 | Stop reason: SDUPTHER

## 2017-11-29 RX ORDER — ONDANSETRON 2 MG/ML
INJECTION INTRAMUSCULAR; INTRAVENOUS PRN
Status: DISCONTINUED | OUTPATIENT
Start: 2017-11-29 | End: 2017-11-29 | Stop reason: SDUPTHER

## 2017-11-29 RX ORDER — LIDOCAINE HYDROCHLORIDE 20 MG/ML
INJECTION, SOLUTION INFILTRATION; PERINEURAL PRN
Status: DISCONTINUED | OUTPATIENT
Start: 2017-11-29 | End: 2017-11-29 | Stop reason: SDUPTHER

## 2017-11-29 RX ORDER — 0.9 % SODIUM CHLORIDE 0.9 %
1000 INTRAVENOUS SOLUTION INTRAVENOUS ONCE
Status: COMPLETED | OUTPATIENT
Start: 2017-11-29 | End: 2017-11-29

## 2017-11-29 RX ORDER — FENTANYL CITRATE 50 UG/ML
INJECTION, SOLUTION INTRAMUSCULAR; INTRAVENOUS PRN
Status: DISCONTINUED | OUTPATIENT
Start: 2017-11-29 | End: 2017-11-29 | Stop reason: SDUPTHER

## 2017-11-29 RX ORDER — LABETALOL HYDROCHLORIDE 5 MG/ML
10 INJECTION, SOLUTION INTRAVENOUS EVERY 10 MIN PRN
Status: DISCONTINUED | OUTPATIENT
Start: 2017-11-29 | End: 2017-11-29

## 2017-11-29 RX ORDER — ROCURONIUM BROMIDE 10 MG/ML
INJECTION, SOLUTION INTRAVENOUS PRN
Status: DISCONTINUED | OUTPATIENT
Start: 2017-11-29 | End: 2017-11-29 | Stop reason: SDUPTHER

## 2017-11-29 RX ORDER — MORPHINE SULFATE 4 MG/ML
4 INJECTION, SOLUTION INTRAMUSCULAR; INTRAVENOUS
Status: DISCONTINUED | OUTPATIENT
Start: 2017-11-29 | End: 2017-12-07 | Stop reason: HOSPADM

## 2017-11-29 RX ADMIN — PROPOFOL 100 MG: 10 INJECTION, EMULSION INTRAVENOUS at 17:07

## 2017-11-29 RX ADMIN — Medication 20 MG: at 17:20

## 2017-11-29 RX ADMIN — PHENYLEPHRINE HYDROCHLORIDE 100 MCG: 10 INJECTION INTRAMUSCULAR; INTRAVENOUS; SUBCUTANEOUS at 17:30

## 2017-11-29 RX ADMIN — HYDROMORPHONE HYDROCHLORIDE 0.5 MG: 1 INJECTION, SOLUTION INTRAMUSCULAR; INTRAVENOUS; SUBCUTANEOUS at 18:38

## 2017-11-29 RX ADMIN — GLYCOPYRROLATE 0.2 MG: 0.2 INJECTION, SOLUTION INTRAMUSCULAR; INTRAVENOUS at 17:30

## 2017-11-29 RX ADMIN — HYDROMORPHONE HYDROCHLORIDE 0.5 MG: 1 INJECTION, SOLUTION INTRAMUSCULAR; INTRAVENOUS; SUBCUTANEOUS at 18:15

## 2017-11-29 RX ADMIN — IOPAMIDOL 80 ML: 755 INJECTION, SOLUTION INTRAVENOUS at 14:16

## 2017-11-29 RX ADMIN — SODIUM CHLORIDE: 9 INJECTION, SOLUTION INTRAVENOUS at 17:01

## 2017-11-29 RX ADMIN — MORPHINE SULFATE 2 MG: 2 INJECTION, SOLUTION INTRAMUSCULAR; INTRAVENOUS at 19:02

## 2017-11-29 RX ADMIN — SUCCINYLCHOLINE CHLORIDE 120 MG: 20 INJECTION, SOLUTION INTRAMUSCULAR; INTRAVENOUS at 17:07

## 2017-11-29 RX ADMIN — KETAMINE HYDROCHLORIDE 25 MG: 50 INJECTION, SOLUTION INTRAMUSCULAR; INTRAVENOUS at 17:12

## 2017-11-29 RX ADMIN — FENTANYL CITRATE 50 MCG: 50 INJECTION INTRAMUSCULAR; INTRAVENOUS at 17:07

## 2017-11-29 RX ADMIN — ONDANSETRON 4 MG: 2 INJECTION INTRAMUSCULAR; INTRAVENOUS at 08:56

## 2017-11-29 RX ADMIN — FENTANYL CITRATE 50 MCG: 50 INJECTION INTRAMUSCULAR; INTRAVENOUS at 17:25

## 2017-11-29 RX ADMIN — MORPHINE SULFATE 2 MG: 2 INJECTION, SOLUTION INTRAMUSCULAR; INTRAVENOUS at 19:28

## 2017-11-29 RX ADMIN — HYDROMORPHONE HYDROCHLORIDE 0.5 MG: 1 INJECTION, SOLUTION INTRAMUSCULAR; INTRAVENOUS; SUBCUTANEOUS at 18:45

## 2017-11-29 RX ADMIN — SUGAMMADEX 200 MG: 100 INJECTION, SOLUTION INTRAVENOUS at 17:50

## 2017-11-29 RX ADMIN — PHENYLEPHRINE HYDROCHLORIDE 100 MCG: 10 INJECTION INTRAMUSCULAR; INTRAVENOUS; SUBCUTANEOUS at 17:35

## 2017-11-29 RX ADMIN — HYDROMORPHONE HYDROCHLORIDE 0.5 MG: 1 INJECTION, SOLUTION INTRAMUSCULAR; INTRAVENOUS; SUBCUTANEOUS at 18:22

## 2017-11-29 RX ADMIN — LABETALOL HYDROCHLORIDE 10 MG: 5 INJECTION INTRAVENOUS at 19:06

## 2017-11-29 RX ADMIN — ONDANSETRON 4 MG: 2 INJECTION INTRAMUSCULAR; INTRAVENOUS at 17:25

## 2017-11-29 RX ADMIN — SODIUM CHLORIDE 1000 ML: 9 INJECTION, SOLUTION INTRAVENOUS at 08:56

## 2017-11-29 RX ADMIN — DEXAMETHASONE SODIUM PHOSPHATE 4 MG: 4 INJECTION, SOLUTION INTRAMUSCULAR; INTRAVENOUS at 17:12

## 2017-11-29 RX ADMIN — METOCLOPRAMIDE 10 MG: 5 INJECTION, SOLUTION INTRAMUSCULAR; INTRAVENOUS at 11:04

## 2017-11-29 RX ADMIN — ONDANSETRON 4 MG: 2 INJECTION INTRAMUSCULAR; INTRAVENOUS at 12:23

## 2017-11-29 RX ADMIN — MORPHINE SULFATE 2 MG: 2 INJECTION, SOLUTION INTRAMUSCULAR; INTRAVENOUS at 19:13

## 2017-11-29 RX ADMIN — LIDOCAINE HYDROCHLORIDE 100 MG: 20 INJECTION, SOLUTION INFILTRATION; PERINEURAL at 17:07

## 2017-11-29 ASSESSMENT — PULMONARY FUNCTION TESTS
PIF_VALUE: 13
PIF_VALUE: 14
PIF_VALUE: 13
PIF_VALUE: 12
PIF_VALUE: 10
PIF_VALUE: 13
PIF_VALUE: 12
PIF_VALUE: 0
PIF_VALUE: 12
PIF_VALUE: 17
PIF_VALUE: 13
PIF_VALUE: 3
PIF_VALUE: 13
PIF_VALUE: 13
PIF_VALUE: 14
PIF_VALUE: 13
PIF_VALUE: 0
PIF_VALUE: 10
PIF_VALUE: 14
PIF_VALUE: 14
PIF_VALUE: 13
PIF_VALUE: 17
PIF_VALUE: 11
PIF_VALUE: 14
PIF_VALUE: 13
PIF_VALUE: 9
PIF_VALUE: 0
PIF_VALUE: 2
PIF_VALUE: 13
PIF_VALUE: 15
PIF_VALUE: 14
PIF_VALUE: 14
PIF_VALUE: 15
PIF_VALUE: 14
PIF_VALUE: 10
PIF_VALUE: 0
PIF_VALUE: 21
PIF_VALUE: 14
PIF_VALUE: 13
PIF_VALUE: 0
PIF_VALUE: 13
PIF_VALUE: 14
PIF_VALUE: 13
PIF_VALUE: 14
PIF_VALUE: 10
PIF_VALUE: 0
PIF_VALUE: 12
PIF_VALUE: 14
PIF_VALUE: 9
PIF_VALUE: 13
PIF_VALUE: 14
PIF_VALUE: 12
PIF_VALUE: 13
PIF_VALUE: 13

## 2017-11-29 ASSESSMENT — ENCOUNTER SYMPTOMS
ABDOMINAL PAIN: 0
SORE THROAT: 0
WHEEZING: 0
COUGH: 0
EYE PAIN: 0
BACK PAIN: 0
VOMITING: 1
CONSTIPATION: 0
NAUSEA: 1
DIARRHEA: 1
RHINORRHEA: 1
EYE DISCHARGE: 0
SHORTNESS OF BREATH: 0
BLOOD IN STOOL: 0

## 2017-11-29 ASSESSMENT — PAIN SCALES - GENERAL
PAINLEVEL_OUTOF10: 8
PAINLEVEL_OUTOF10: 8
PAINLEVEL_OUTOF10: 7
PAINLEVEL_OUTOF10: 7
PAINLEVEL_OUTOF10: 2
PAINLEVEL_OUTOF10: 7
PAINLEVEL_OUTOF10: 6
PAINLEVEL_OUTOF10: 9

## 2017-11-29 ASSESSMENT — PAIN DESCRIPTION - DESCRIPTORS
DESCRIPTORS: ACHING

## 2017-11-29 ASSESSMENT — PAIN DESCRIPTION - PAIN TYPE
TYPE: SURGICAL PAIN

## 2017-11-29 ASSESSMENT — PAIN DESCRIPTION - FREQUENCY: FREQUENCY: CONTINUOUS

## 2017-11-29 ASSESSMENT — PAIN DESCRIPTION - ONSET: ONSET: AWAKENED FROM SLEEP

## 2017-11-29 ASSESSMENT — PAIN DESCRIPTION - LOCATION
LOCATION: ABDOMEN

## 2017-11-29 NOTE — H&P
[facility]  General Surgery History and Physical  Dr. Abraham Mane Name: Lera Mortimer  MRN: [id number]  YOB: 1931  Date of evaluation: 11/29/2017  Primary Care Physician: Claritza Lee MD  Patient evaluated at the request of  ED physician  Reason for evaluation: pneumoperitoneum  IMPRESSIONS:   1. Pneumoperitoneum likely related to ulcer  2.  does not have any pertinent problems on file. PLANS:   1. Admit type: Inpatient  2. It is expected this patient's LOS will be: Greater than 2 midnights  3. Anticipated Disposition Upon Discharge: Home  4. Analgesics and antiemetics on a prn basis  5. IV hydration  6. Empiric antibiotic coverage  7. DVT prophylaxis with SCD's  8. Home Medications as ordered  9. OR for exploration and possible ulcer repair  10. The risks, options and alternatives were discussed at length with the patient. The risks including bleeding, infection and possible conversion to an open procedure were covered. The possibility of other unforeseen complications including other organ injury, injury to surrounding structures, infection and abscess were mentioned. We also discussed the conduct of the operation, probable postoperative course and the typical post operative recovery and restrictions. The patients questions were answered. After this discussion, the patient would like to proceed with exploration. 11. Further recommendations to follow  SUBJECTIVE:   History of Chief Complaint:    Dante Condon is a 80 y. o.female who presents with constant nausea. This started yesterday. It is constant, she has only vomited once and it was dark. She admits to history of ulcers, denies any upper abdominal pains. Admits to diarrhea, and fatigue. Denies any fever or chills,change in diet, exposure to anyone else with similar symptoms. Past Medical History   has a past medical history of Alcohol abuse;  Anxiety; Colon polyp; Depression; GERD (gastroesophageal reflux disease); without wheezes, rales or rhonchi. No accessory muscle use. Scars None   CARDIOVASCULAR: Heart sounds are normal.  Regular rate and rhythm without murmur, gallop or rub. Normal S1 and S2. Carotid and femoral pulses 2+/4 and equal bilaterally. ABDOMEN: Normal shape. left groin and Laparoscopic scar(s) present. Normal bowel sounds. No bruits. soft, nondistended, no masses or organomegaly. no evidence of hernia. Percussion: Normal without hepatosplenomegally. Tenderness: minimal epoigastric tenderness only with deep palpation. RECTAL: deferred, not clinically indicated  NEUROLOGIC: There are no focalizing motor or sensory deficits. CN II-XII are grossly intact. Har[place] Old EXTREMITIES: no cyanosis, no clubbing and no edema. LABS:     Recent Labs      11/29/17   0936   WBC  6.5   HGB  10.2*   HCT  31.0*   PLT  229   NA  139   K  4.4   CL  103   CO2  25   BUN  28*   CREATININE  1.0   CALCIUM  8.6   AST  23   ALT  13   BILITOT  0.3   LACTA  1.1     RADIOLOGY:   I have personally reviewed the following films:  CT scan abd/pelvis: pneumoperitoneum    Thank you for the interesting evaluation. Further recommendations to follow.       Electronically signed by Lalito Gerard DO on 11/29/2017 at 4:06 PM

## 2017-11-29 NOTE — ED PROVIDER NOTES
Cardiovascular: Negative for chest pain, palpitations and leg swelling. Gastrointestinal: Positive for diarrhea, nausea and vomiting. Negative for abdominal pain, blood in stool and constipation. Genitourinary: Negative for difficulty urinating, dysuria, hematuria and vaginal discharge. Musculoskeletal: Negative for arthralgias, back pain, joint swelling and neck pain. Skin: Negative for pallor and rash. Neurological: Positive for weakness (generalized). Negative for dizziness, syncope, light-headedness, numbness and headaches. Hematological: Negative for adenopathy. Psychiatric/Behavioral: Negative for confusion, dysphoric mood and suicidal ideas. The patient is not nervous/anxious. PAST MEDICAL HISTORY    has a past medical history of Alcohol abuse; Anxiety; Colon polyp; Depression; GERD (gastroesophageal reflux disease); Hyperlipidemia; Hypothyroid; Neuropathy, peripheral ([address] Osteoarthritis; Osteoporosis; Thyroid disease; and Vitamin D deficiency. SURGICAL HISTORY      has a past surgical history that includes Cholecystectomy; joint replacement; hernia repair; Hemorrhoid surgery (); Endoscopy, colon, diagnostic (8-2014); eye surgery; Tonsillectomy; Colonoscopy; and Upper gastrointestinal endoscopy. CURRENT MEDICATIONS       Previous Medications    CELECOXIB (CELEBREX) 200 MG CAPSULE    take 1 tablet by mouth once daily    CITALOPRAM (CELEXA) 40 MG TABLET    Take 0.5 tablets by mouth daily    DENOSUMAB (PROLIA) 60 MG/ML SOLN SC INJECTION    Infuse 1 mL intravenously once for 1 dose    GABAPENTIN (NEURONTIN) 100 MG CAPSULE    2  Pills  At  Night    LEVOTHYROXINE (SYNTHROID) 50 MCG TABLET    take 1 tablet by mouth once daily    LORAZEPAM (ATIVAN) 0.5 MG TABLET    take 1 tablet by mouth every 6 hours if needed for anxiety    MIRABEGRON ER (MYRBETRIQ) 25 MG TB24    One  A day    MULTIPLE VITAMINS-MINERALS (PRESERVISION/LUTEIN) CAPS    Take 1 capsule by mouth 2 times daily. Pulses:       Radial pulses are 2+ on the right side. Pulmonary/Chest: Effort normal and breath sounds normal. No accessory muscle usage. No respiratory distress. She has no decreased breath sounds. She has no wheezes. She has no rhonchi. She has no rales. Abdominal: Soft. Normal appearance and bowel sounds are normal. She exhibits no distension and no pulsatile midline mass. There is no tenderness. There is no rigidity, no rebound, no guarding, no CVA tenderness, no tenderness at McBurney's point and negative Garcia's sign. No hernia. Musculoskeletal: Normal range of motion. Right lower leg: Normal. She exhibits no edema. Left lower leg: Normal. She exhibits no edema. Lymphadenopathy:     She has no cervical adenopathy. Neurological: She is alert and oriented to person, place, and time. She has normal strength. She is not disoriented. No cranial nerve deficit. Gait normal. GCS eye subscore is 4. GCS verbal subscore is 5. GCS motor subscore is 6. Skin: Skin is warm, dry and intact. No rash noted. She is not diaphoretic. No pallor. Psychiatric: She has a normal mood and affect. Her speech is normal and behavior is normal. Thought content normal.   Nursing note and vitals reviewed. DIFFERENTIAL DIAGNOSIS:   Including but not limited to: Viral gastroenteritis, medication reaction, influenza, diverticulitis, Acute coronary syndrome, SBO    DIAGNOSTIC RESULTS     EKG: All EKG's are interpreted by the Emergency Department Physician who either signs or Co-signs this chart in the absence of a cardiologist.  EKG read and interpreted by myself gives impression of normal sinus rhythm with heart rate of 99; interval 154; QRS 76;QTc 479; axis 45 -48 80. No STEMI     RADIOLOGY: non-plain film images(s) such as CT, Ultrasound and MRI are read by the radiologist.  Plain radiographic images are visualized and preliminarily interpreted by the emergency physician unless otherwise stated below.   CT ABDOMEN PELVIS W IV CONTRAST Additional Contrast? None   Final Result   1. Right upper quadrant pneumoperitoneum of indeterminate origin but possibly secondary to perforated adjacent viscus structure. 2. Worsening fractures of the T11, L1 and L3 vertebra. Critical findings were discussed with Justin Alfaro on 11/29/2017 at 3:07 PM.      Final report electronically signed by Dr. Esteban Manzo on 11/29/2017 3:08 PM      XR Acute Abd Series Chest 1 VW   Final Result   1. No definite acute chest findings. 2. Mild to moderate gaseous distention of multiple loops of large and small bowel, nonspecific but ileus not excluded. Follow-up CT is recommended as clinically indicated. **This report has been created using voice recognition software. It may contain minor errors which are inherent in voice recognition technology. **      Final report electronically signed by Dr. Lori Zamudio on 11/29/2017 12:41 PM          LABS:   Labs Reviewed   CBC WITH AUTO DIFFERENTIAL - Abnormal; Notable for the following:        Result Value    RBC 3.39 (*)     Hemoglobin 10.2 (*)     Hematocrit 31.0 (*)     RDW 15.1 (*)     Lymphocytes # 0.4 (*)     Monocytes # 0.3 (*)     All other components within normal limits   COMPREHENSIVE METABOLIC PANEL - Abnormal; Notable for the following:     Glucose 121 (*)     BUN 28 (*)     All other components within normal limits   GLOMERULAR FILTRATION RATE, ESTIMATED - Abnormal; Notable for the following:     Est, Glom Filt Rate 53 (*)     All other components within normal limits   URINE WITH REFLEXED MICRO - Abnormal; Notable for the following:     Ketones, Urine 15 (*)     Specific Gravity, Urine > 1.030 (*)     Nitrite, Urine POSITIVE (*)     All other components within normal limits   URINE CULTURE, REFLEXED    Narrative:     Source: urine, clean catch       Site: clean void          Current Antibiotics: none   LACTIC ACID, PLASMA   ANION GAP   OSMOLALITY   TROPONIN

## 2017-11-29 NOTE — ANESTHESIA PRE PROCEDURE
Department of Anesthesiology  Preprocedure Note       Name:  Donato Councilman   Age:  80 y.o.  :  1931                                          MRN:  [id number]         Date:  2017      Surgeon: Ilda White):  Genet Herrera DO    Procedure: Procedure(s):  EXPLORATORY LAPAROTOMY, PROBABLE ULCER REPAIR    Medications prior to admission:   Prior to Admission medications    Medication Sig Start Date End Date Taking? Authorizing Provider   traMADol (ULTRAM) 50 MG tablet take 1 to 2 tablets by mouth every 6 hours if needed for pain. 17   Phi Singleton MD   denosumab (PROLIA) 60 MG/ML SOLN SC injection Infuse 1 mL intravenously once for 1 dose 17  Phi Singleton MD   celecoxib (CELEBREX) 200 MG capsule take 1 tablet by mouth once daily 17   Phi Singleton MD   citalopram (CELEXA) 40 MG tablet Take 0.5 tablets by mouth daily 17   Phi Singleton MD   omeprazole (PRILOSEC) 40 MG delayed release capsule Take 1 capsule by mouth daily 17   Phi Singleton MD   LORazepam (ATIVAN) 0.5 MG tablet take 1 tablet by mouth every 6 hours if needed for anxiety 10/3/17   Phi Singleton MD   levothyroxine (SYNTHROID) 50 MCG tablet take 1 tablet by mouth once daily 17   Phi Singleton MD   gabapentin (NEURONTIN) 100 MG capsule 2  Pills  At  Night 17   Phi Singleton MD   Mirabegron ER (MYRBETRIQ) 25 MG TB24 One  A day 4/10/17   Phi Singleton MD   Multiple Vitamins-Minerals (PRESERVISION/LUTEIN) CAPS Take 1 capsule by mouth 2 times daily.       Historical Provider, MD       Current medications:    Current Facility-Administered Medications   Medication Dose Route Frequency Provider Last Rate Last Dose    0.9 % sodium chloride infusion   Intravenous Continuous Inna Hernandez DO        sodium chloride flush 0.9 % injection 10 mL  10 mL Intravenous 2 times per day Inna Hernandez,         sodium chloride flush 0.9 % injection 10 mL 10 mL Intravenous PRN Marbin Hernandez,         cefOXitin (MEFOXIN) 2 g in sterile water 20 mL IV syringe  2 g Intravenous On Call to [address],          Current Outpatient Prescriptions   Medication Sig Dispense Refill    traMADol (ULTRAM) 50 MG tablet take 1 to 2 tablets by mouth every 6 hours if needed for pain. 100 tablet 0    denosumab (PROLIA) 60 MG/ML SOLN SC injection Infuse 1 mL intravenously once for 1 dose 1 mL 1    celecoxib (CELEBREX) 200 MG capsule take 1 tablet by mouth once daily 90 capsule 1    citalopram (CELEXA) 40 MG tablet Take 0.5 tablets by mouth daily 45 tablet 1    omeprazole (PRILOSEC) 40 MG delayed release capsule Take 1 capsule by mouth daily 90 capsule 1    LORazepam (ATIVAN) 0.5 MG tablet take 1 tablet by mouth every 6 hours if needed for anxiety 120 tablet 0    levothyroxine (SYNTHROID) 50 MCG tablet take 1 tablet by mouth once daily 90 tablet 1    gabapentin (NEURONTIN) 100 MG capsule 2  Pills  At  Night 180 capsule 1    Mirabegron ER (MYRBETRIQ) 25 MG TB24 One  A day 30 tablet 5    Multiple Vitamins-Minerals (PRESERVISION/LUTEIN) CAPS Take 1 capsule by mouth 2 times daily. Allergies:     Allergies   Allergen Reactions    Amoxicillin-Pot Clavulanate Diarrhea    Statins Support Therapy      Unaware of allery    Sulfa Antibiotics Nausea Only       Problem List:    Patient Active Problem List   Diagnosis Code    Anxiety F41.9    Osteoarthritis M19.90    Depression F32.9    GERD (gastroesophageal reflux disease) K21.9    Hyperlipidemia E78.5    Osteoporosis M81.0    Neuropathy, peripheral ([address] G62.9    Vitamin D deficiency E55.9    Hypothyroid E03.9    Senile osteoporosis M81.0    Age-related osteoporosis without current pathological fracture M81.0    Chronic midline low back pain without sciatica M54.5, G89.29    Pneumoperitoneum K66.8       Past Medical History:        Diagnosis Date    Alcohol abuse sober since 200    Anxiety    

## 2017-11-29 NOTE — ED NOTES
Patient to room 17 per intake c/o nausea, diarrhea and 1 episode of emesis this morning that was dark red. Denies any CP, SOB, dizziness, or abd pain. Respirations easy and unlabored. Skin warm and dry. Decrease appetite the past two days. On cardiac monitor.       Queenie Ann RN  11/29/17 1188

## 2017-11-29 NOTE — OP NOTE
Mary Burgos 60  RECORD OF OPERATION  PATIENT NAME: Felix Cisneros [facility]  MEDICAL RECORD NO. [id number]  DATE: 11/29/2017  SURGEON: Radha Jeffrey. BELA Hernandez [facility]  PRIMARY CARE PHYSICIAN: Bo Martel MD     PREOPERATIVE DIAGNOSIS: PERFERATION  POSTOPERATIVE DIAGNOSIS:  Gastric perforation  PROCEDURE PERFORMED:  Cinda Caal patch repair of gastric perforation   SURGEON:  Dr. Radha Jeffrey. David  ANESTHESIA:  General with local.  ESTIMATED BLOOD LOSS:  10 ml. SPECIMENS:  None  COMPLICATIONS:  None immediately appreciated. DISCUSSION:  William Hsieh is a 80y.o.-year-old female who presented to the hospital with pneumoperitoneum. After history and physical examination was performed, potential diagnostic and therapeutic modalities were discussed with the patient. Operative and non operative management were discussed. Risks, complications and benefits were reviewed. She was given then opportunity to ask questions. Once answered, informed was obtained. She was brought to the operating room on 11/29/2017 for the procedure. OPERATIVE FINDINGS:  At the time of exploration, William Hsieh was found to have a small perforated ulcer. PROCEDURE:  The patient was brought to the operating room, placed in the supine position, placed under continuous cardiac telemetry, blood pressure and pulse oximetry monitoring and placed under general anesthesia by the Anesthesia Department. The anterior abdominal wall was prepped and draped in the sterile fashion. A vertical midline incision was made with a #10 scalpel blade and carried down to the subcutaneous tissues. Subcutaneous tissue dissection carried out with electrocautery down through the linea alba allowing safe entry into the abdomen. Visual and manual inspection were carried out, please see operative findings above for specifics. The ulcer crater was small with minimal seepage. The area grasped with forceps and approximated in 2 layers with 2-0 Prolene suture.  The area was testing with air insufflation via the NG tube and irrigation in the abdomen with no evidence of leakage. A 19 Sherice drain placed near the repair and sutured to the abdominal wall with 2-0 silk suture. All packs and instruments were removed from the patient's abdomen. The abdomen was copiously irrigated and excess irrigation fluid removed via suction. All packs and instruments were removed from the patients abdomen. A #19 sherice drain placed in Kamara's pouch near the repair and secured to the skin with 2-0 silk suture. The fascia approximated with looped PDS suture in running fashion. The deep tissues were approximated using 2-0 Vicryl suture. Deep dermis approximated using 3-0 Vicryl suture. Skin closed with skin staples after infiltration with local anesthetic. The wound was then cleansed, sterile dressings were applied. The patient brought out of anesthesia and transferred to PACU in stable and satisfactory condition. No immediate complication evident. All sponge, instrument and needle counts were correct at the completion of the procedure. Postoperative findings were discussed with the patient's family. She will be admitted to the hospital with discharge pending her progression.        Electronically signed by Binh Garcia DO on 11/29/2017 at 5:56 PM

## 2017-11-29 NOTE — ED NOTES
Dr. Valeria Sarah at bedside. Pt updated on POC. VSS. Family at bedside. Will monitor.       Chaneta Galeazzi, RN  11/29/17 6567

## 2017-11-30 LAB
ANION GAP SERPL CALCULATED.3IONS-SCNC: 15 MEQ/L (ref 8–16)
BUN BLDV-MCNC: 30 MG/DL (ref 7–22)
CALCIUM SERPL-MCNC: 7.5 MG/DL (ref 8.5–10.5)
CHLORIDE BLD-SCNC: 109 MEQ/L (ref 98–111)
CO2: 19 MEQ/L (ref 23–33)
CREAT SERPL-MCNC: 0.9 MG/DL (ref 0.4–1.2)
GFR SERPL CREATININE-BSD FRML MDRD: 59 ML/MIN/1.73M2
GLUCOSE BLD-MCNC: 125 MG/DL (ref 70–108)
HCT VFR BLD CALC: 28.8 % (ref 37–47)
HEMOGLOBIN: 9.3 GM/DL (ref 12–16)
MCH RBC QN AUTO: 30.4 PG (ref 27–31)
MCHC RBC AUTO-ENTMCNC: 32.5 GM/DL (ref 33–37)
MCV RBC AUTO: 93.5 FL (ref 81–99)
ORGANISM: ABNORMAL
PDW BLD-RTO: 14.9 % (ref 11.5–14.5)
PLATELET # BLD: 196 THOU/MM3 (ref 130–400)
PMV BLD AUTO: 8.2 MCM (ref 7.4–10.4)
POTASSIUM SERPL-SCNC: 3.9 MEQ/L (ref 3.5–5.2)
RBC # BLD: 3.08 MILL/MM3 (ref 4.2–5.4)
SODIUM BLD-SCNC: 143 MEQ/L (ref 135–145)
URINE CULTURE REFLEX: ABNORMAL
WBC # BLD: 8.5 THOU/MM3 (ref 4.8–10.8)

## 2017-11-30 PROCEDURE — 43840 GSTRRPHY SUTR DUOL/GSTR ULCR: CPT | Performed by: SURGERY

## 2017-11-30 PROCEDURE — 6360000002 HC RX W HCPCS: Performed by: FAMILY MEDICINE

## 2017-11-30 PROCEDURE — 6360000002 HC RX W HCPCS: Performed by: SURGERY

## 2017-11-30 PROCEDURE — 2580000003 HC RX 258: Performed by: SURGERY

## 2017-11-30 PROCEDURE — 36415 COLL VENOUS BLD VENIPUNCTURE: CPT

## 2017-11-30 PROCEDURE — 99024 POSTOP FOLLOW-UP VISIT: CPT | Performed by: SURGERY

## 2017-11-30 PROCEDURE — A6212 FOAM DRG <=16 SQ IN W/BORDER: HCPCS

## 2017-11-30 PROCEDURE — 80048 BASIC METABOLIC PNL TOTAL CA: CPT

## 2017-11-30 PROCEDURE — 85027 COMPLETE CBC AUTOMATED: CPT

## 2017-11-30 PROCEDURE — 1200000000 HC SEMI PRIVATE

## 2017-11-30 PROCEDURE — C9113 INJ PANTOPRAZOLE SODIUM, VIA: HCPCS | Performed by: FAMILY MEDICINE

## 2017-11-30 RX ORDER — PANTOPRAZOLE SODIUM 40 MG/10ML
40 INJECTION, POWDER, LYOPHILIZED, FOR SOLUTION INTRAVENOUS DAILY
Status: DISCONTINUED | OUTPATIENT
Start: 2017-11-30 | End: 2017-12-04

## 2017-11-30 RX ADMIN — MORPHINE SULFATE 4 MG: 4 INJECTION INTRAVENOUS at 11:49

## 2017-11-30 RX ADMIN — ENOXAPARIN SODIUM 40 MG: 40 INJECTION SUBCUTANEOUS at 13:13

## 2017-11-30 RX ADMIN — MORPHINE SULFATE 4 MG: 4 INJECTION INTRAVENOUS at 20:09

## 2017-11-30 RX ADMIN — SODIUM CHLORIDE: 9 INJECTION, SOLUTION INTRAVENOUS at 22:36

## 2017-11-30 RX ADMIN — MORPHINE SULFATE 2 MG: 2 INJECTION, SOLUTION INTRAMUSCULAR; INTRAVENOUS at 00:45

## 2017-11-30 RX ADMIN — ONDANSETRON 4 MG: 2 INJECTION INTRAMUSCULAR; INTRAVENOUS at 06:48

## 2017-11-30 RX ADMIN — SODIUM CHLORIDE: 9 INJECTION, SOLUTION INTRAVENOUS at 07:46

## 2017-11-30 RX ADMIN — MORPHINE SULFATE 4 MG: 4 INJECTION INTRAVENOUS at 15:16

## 2017-11-30 RX ADMIN — PANTOPRAZOLE SODIUM 40 MG: 40 INJECTION, POWDER, FOR SOLUTION INTRAVENOUS at 10:23

## 2017-11-30 RX ADMIN — CIPROFLOXACIN 400 MG: 2 INJECTION, SOLUTION INTRAVENOUS at 00:38

## 2017-11-30 RX ADMIN — ONDANSETRON 4 MG: 2 INJECTION INTRAMUSCULAR; INTRAVENOUS at 16:11

## 2017-11-30 RX ADMIN — Medication 10 ML: at 10:23

## 2017-11-30 RX ADMIN — MORPHINE SULFATE 2 MG: 2 INJECTION, SOLUTION INTRAMUSCULAR; INTRAVENOUS at 03:33

## 2017-11-30 RX ADMIN — MORPHINE SULFATE 2 MG: 2 INJECTION, SOLUTION INTRAMUSCULAR; INTRAVENOUS at 07:26

## 2017-11-30 RX ADMIN — MORPHINE SULFATE 2 MG: 2 INJECTION, SOLUTION INTRAMUSCULAR; INTRAVENOUS at 09:30

## 2017-11-30 RX ADMIN — ONDANSETRON 4 MG: 2 INJECTION INTRAMUSCULAR; INTRAVENOUS at 22:35

## 2017-11-30 ASSESSMENT — PAIN SCALES - GENERAL
PAINLEVEL_OUTOF10: 7
PAINLEVEL_OUTOF10: 6
PAINLEVEL_OUTOF10: 6
PAINLEVEL_OUTOF10: 5
PAINLEVEL_OUTOF10: 9
PAINLEVEL_OUTOF10: 9
PAINLEVEL_OUTOF10: 5
PAINLEVEL_OUTOF10: 6
PAINLEVEL_OUTOF10: 9
PAINLEVEL_OUTOF10: 9
PAINLEVEL_OUTOF10: 5
PAINLEVEL_OUTOF10: 6
PAINLEVEL_OUTOF10: 0
PAINLEVEL_OUTOF10: 9
PAINLEVEL_OUTOF10: 5

## 2017-11-30 ASSESSMENT — PAIN DESCRIPTION - DESCRIPTORS
DESCRIPTORS: ACHING

## 2017-11-30 ASSESSMENT — PAIN DESCRIPTION - PAIN TYPE
TYPE: ACUTE PAIN;SURGICAL PAIN
TYPE: ACUTE PAIN;SURGICAL PAIN
TYPE: SURGICAL PAIN
TYPE: ACUTE PAIN;SURGICAL PAIN
TYPE: SURGICAL PAIN

## 2017-11-30 ASSESSMENT — PAIN DESCRIPTION - LOCATION
LOCATION: ABDOMEN

## 2017-11-30 ASSESSMENT — PAIN DESCRIPTION - FREQUENCY: FREQUENCY: CONTINUOUS

## 2017-11-30 NOTE — PROGRESS NOTES
Deandre Hernandez  Postoperative Progress Note  Pt Name: Nalini Arellano  Medical Record Number: [id number]  Date of Birth 1931   Today's Date: 2017  ASSESSMENT   1. POD # 1 repair of perforated gastric ulcer   has a past medical history of Alcohol abuse; Anxiety; Colon polyp; Depression; GERD (gastroesophageal reflux disease); Hyperlipidemia; Hypothyroid; Neuropathy, peripheral ([address] Osteoarthritis; Osteoporosis; Thyroid disease; and Vitamin D deficiency. PLAN   1. Analgesics and antiemetics as needed  2. IV hydration  3. Sips and chips only, otherwise NPO, Ng to LIS  4. Antacid therapy  5. Increase activity  6. Lovenox for DVT prophylaxis  7. UGi ordered for tomorrow to assess patency and repair  Del Fitzgerald is doing fairly well. She admits to nausea that is better with medications. She denies any vomiting, has not passed flatus or had a bowel movement. She is tolerating a Diet NPO Effective Now Exceptions are: Ice Chips, Sips with Meds. Her pain is well controlled on current medications. She has not yet been out of bed. CURRENT MEDICATIONS   Scheduled Meds:   pantoprazole  40 mg Intravenous Daily    PRESERVISION AREDS 2  1 capsule Oral BID    sodium chloride flush  10 mL Intravenous 2 times per day    sodium chloride flush  10 mL Intravenous 2 times per day    enoxaparin  40 mg Subcutaneous Q24H     Continuous Infusions:   sodium chloride 75 mL/hr at 17 0746     PRN Meds:.sodium chloride flush, sodium chloride flush, acetaminophen, ondansetron, morphine **OR** morphine, morphine  OBJECTIVE   CURRENT VITALS:  height is 5' 3\" (1.6 m) and weight is 115 lb (52.2 kg). Her oral temperature is 98.4 °F (36.9 °C). Her blood pressure is 126/65 and her pulse is 92. Her respiration is 18 and oxygen saturation is 94%.    Temperature Range (24h):Temp: 98.4 °F (36.9 °C) Temp  Av.8 °F (37.1 °C)  Min: 98.2 °F (36.8 °C)  Max: 99.4 °F (37.4 Christiano Klein DO on 11/30/2017 at 12:09 PM

## 2017-11-30 NOTE — PROGRESS NOTES
Pharmacy Renal Adjustment Consult    Donato Councilman is a 80 y.o. female. Pharmacy has been consulted to renally adjust Pepcid per P&T protocol. Recent Labs      11/29/17   0936   BUN  28*       Recent Labs      11/29/17   0936   CREATININE  1.0       Estimated Creatinine Clearance: 33 mL/min (based on SCr of 1 mg/dL). Height:   Ht Readings from Last 1 Encounters:   11/29/17 5' 3\" (1.6 m)     Weight:  Wt Readings from Last 1 Encounters:   11/29/17 115 lb (52.2 kg)       CKD stage:         Baseline SCr:     Plan: Adjust the following medications based on renal function:  Decrease Pepcid to 20 mg IV daily.   Miquel Reis [place] 11/29/2017 11:16 PM

## 2017-11-30 NOTE — PLAN OF CARE
Problem: Falls - Risk of  Goal: Absence of falls  Outcome: Met This Shift  No falls noted this shift. Patient ambulates with x1 staff assistance without difficulty. . Bed kept in low position. Safe environment maintained. Bedside table & call light in reach. Uses call light appropriately when needing assistance. Fall band and sign posted for safety. Bed and chair alarms in place    Problem: Pain:  Goal: Pain level will decrease  Pain level will decrease   Outcome: Met This Shift  Pt states pain goal of 2. Pt taking IV morphine for pain as needed. Pt with post op pain of 8 and decreases to 4-5. Pain goal not met as yet. Problem: Cardiovascular  Goal: No DVT, peripheral vascular complications  Outcome: Met This Shift  No chest pain, shortness of breath or edema noted. lovenox and pcds for dvt prophylaxis. Pt assisted up to chair with 2 assists    Problem: Respiratory  Goal: O2 Sat > 90%  Outcome: Met This Shift  Pt enc to use incentive spirometer. No supplemental oxygen needed as sats are 9-95% on room air    Problem: GI  Goal: No bowel complications  Outcome: Ongoing  NG tube to right nares with green bile noted in canister. No nausea noted. Bowel sounds diminished, not passing gas. Abdomen soft. Problem:   Goal: Adequate urinary output  Outcome: Met This Shift  Berumen with clear yellow urine noted, greater than 30ml/hr    Problem: Nutrition  Goal: Optimal nutrition therapy  Outcome: Not Met This Shift  Pt npo with ice and sips. NG tube to right nares. IV of .9ns nfusing at 75ml/hr. Swabs and lip balm for moisture    Problem: Skin Integrity/Risk  Goal: Wound healing  Outcome: Met This Shift  Midline abdominal incision with dry dressing noted. No redness, edema or drainage noted. spr plus mattress applied. Sacrum with allevyn border and alos placed to spine. Problem: HH SN NASOGASTRIC INSERTION  Intervention: ASSESS BOWEL SOUNDS  Bowel sounds diminished.  NG tube in place and verified with air bolus and

## 2017-11-30 NOTE — PROGRESS NOTES
Pt admitted to  [address] [place] [address] 68 per Dr Nate Khalil from Surgery. Complains of Abdominal pain. IV of NS  infusing into Right hand with 600mls to count. IV site free of s/s of infection or infiltration. Instructed in use of call light, tv controls, bed controls and 5 minute rule scripted to pt with understanding verbalized. Fall and safety brochure discussed with pt. Patient has O2 at 2 L. NG LIWS, dark brown output in Ng, Berumen in place, clear yellow urine returning. Bi lat SCD's.

## 2017-11-30 NOTE — PLAN OF CARE
Problem: Discharge Planning:  Goal: Discharged to appropriate level of care  Discharged to appropriate level of care  Outcome: Ongoing  Pt lives alone. Has children to help her at discharge    Comments: Care plan reviewed with patient . Patient  verbalize understanding of the plan of care and contribute to goal setting.

## 2017-12-01 ENCOUNTER — APPOINTMENT (OUTPATIENT)
Dept: GENERAL RADIOLOGY | Age: 82
DRG: 327 | End: 2017-12-01
Payer: MEDICARE

## 2017-12-01 LAB
ANION GAP SERPL CALCULATED.3IONS-SCNC: 10 MEQ/L (ref 8–16)
BUN BLDV-MCNC: 18 MG/DL (ref 7–22)
CALCIUM SERPL-MCNC: 7.5 MG/DL (ref 8.5–10.5)
CHLORIDE BLD-SCNC: 108 MEQ/L (ref 98–111)
CO2: 24 MEQ/L (ref 23–33)
CREAT SERPL-MCNC: 0.7 MG/DL (ref 0.4–1.2)
GFR SERPL CREATININE-BSD FRML MDRD: 79 ML/MIN/1.73M2
GLUCOSE BLD-MCNC: 91 MG/DL (ref 70–108)
HCT VFR BLD CALC: 26.2 % (ref 37–47)
HEMOGLOBIN: 8.7 GM/DL (ref 12–16)
MCH RBC QN AUTO: 30.8 PG (ref 27–31)
MCHC RBC AUTO-ENTMCNC: 33.1 GM/DL (ref 33–37)
MCV RBC AUTO: 93.1 FL (ref 81–99)
PDW BLD-RTO: 15 % (ref 11.5–14.5)
PLATELET # BLD: 170 THOU/MM3 (ref 130–400)
PMV BLD AUTO: 8.5 MCM (ref 7.4–10.4)
POTASSIUM SERPL-SCNC: 3.7 MEQ/L (ref 3.5–5.2)
RBC # BLD: 2.82 MILL/MM3 (ref 4.2–5.4)
SODIUM BLD-SCNC: 142 MEQ/L (ref 135–145)
WBC # BLD: 6.3 THOU/MM3 (ref 4.8–10.8)

## 2017-12-01 PROCEDURE — 99024 POSTOP FOLLOW-UP VISIT: CPT | Performed by: NURSE PRACTITIONER

## 2017-12-01 PROCEDURE — C9113 INJ PANTOPRAZOLE SODIUM, VIA: HCPCS | Performed by: FAMILY MEDICINE

## 2017-12-01 PROCEDURE — 6360000004 HC RX CONTRAST MEDICATION: Performed by: SURGERY

## 2017-12-01 PROCEDURE — 2500000003 HC RX 250 WO HCPCS: Performed by: SURGERY

## 2017-12-01 PROCEDURE — 1200000000 HC SEMI PRIVATE

## 2017-12-01 PROCEDURE — A6210 FOAM DRG >16<=48 SQ IN W/O B: HCPCS

## 2017-12-01 PROCEDURE — 6360000002 HC RX W HCPCS: Performed by: FAMILY MEDICINE

## 2017-12-01 PROCEDURE — 80048 BASIC METABOLIC PNL TOTAL CA: CPT

## 2017-12-01 PROCEDURE — 2580000003 HC RX 258: Performed by: SURGERY

## 2017-12-01 PROCEDURE — 36415 COLL VENOUS BLD VENIPUNCTURE: CPT

## 2017-12-01 PROCEDURE — 74240 X-RAY XM UPR GI TRC 1CNTRST: CPT

## 2017-12-01 PROCEDURE — 85027 COMPLETE CBC AUTOMATED: CPT

## 2017-12-01 PROCEDURE — 6360000002 HC RX W HCPCS: Performed by: SURGERY

## 2017-12-01 RX ADMIN — ONDANSETRON 4 MG: 2 INJECTION INTRAMUSCULAR; INTRAVENOUS at 08:31

## 2017-12-01 RX ADMIN — MORPHINE SULFATE 4 MG: 4 INJECTION INTRAVENOUS at 03:54

## 2017-12-01 RX ADMIN — ONDANSETRON 4 MG: 2 INJECTION INTRAMUSCULAR; INTRAVENOUS at 14:35

## 2017-12-01 RX ADMIN — DIATRIZOATE MEGLUMINE AND DIATRIZOATE SODIUM 120 ML: 600; 100 SOLUTION ORAL; RECTAL at 09:52

## 2017-12-01 RX ADMIN — SODIUM CHLORIDE: 9 INJECTION, SOLUTION INTRAVENOUS at 12:51

## 2017-12-01 RX ADMIN — Medication 10 ML: at 08:37

## 2017-12-01 RX ADMIN — ENOXAPARIN SODIUM 40 MG: 40 INJECTION SUBCUTANEOUS at 13:36

## 2017-12-01 RX ADMIN — MORPHINE SULFATE 4 MG: 4 INJECTION INTRAVENOUS at 08:36

## 2017-12-01 RX ADMIN — ONDANSETRON 4 MG: 2 INJECTION INTRAMUSCULAR; INTRAVENOUS at 21:18

## 2017-12-01 RX ADMIN — MORPHINE SULFATE 4 MG: 4 INJECTION INTRAVENOUS at 00:44

## 2017-12-01 RX ADMIN — PANTOPRAZOLE SODIUM 40 MG: 40 INJECTION, POWDER, FOR SOLUTION INTRAVENOUS at 08:33

## 2017-12-01 RX ADMIN — BARIUM SULFATE 60 ML: 0.6 SUSPENSION ORAL at 09:52

## 2017-12-01 RX ADMIN — MORPHINE SULFATE 4 MG: 4 INJECTION INTRAVENOUS at 11:53

## 2017-12-01 ASSESSMENT — PAIN SCALES - GENERAL
PAINLEVEL_OUTOF10: 9
PAINLEVEL_OUTOF10: 8
PAINLEVEL_OUTOF10: 7
PAINLEVEL_OUTOF10: 7
PAINLEVEL_OUTOF10: 5
PAINLEVEL_OUTOF10: 9
PAINLEVEL_OUTOF10: 8
PAINLEVEL_OUTOF10: 9
PAINLEVEL_OUTOF10: 3
PAINLEVEL_OUTOF10: 5
PAINLEVEL_OUTOF10: 9
PAINLEVEL_OUTOF10: 9
PAINLEVEL_OUTOF10: 5

## 2017-12-01 ASSESSMENT — PAIN DESCRIPTION - DESCRIPTORS: DESCRIPTORS: ACHING;SORE;DISCOMFORT

## 2017-12-01 ASSESSMENT — PAIN DESCRIPTION - ORIENTATION: ORIENTATION: MID

## 2017-12-01 ASSESSMENT — PAIN DESCRIPTION - LOCATION
LOCATION: ABDOMEN

## 2017-12-01 ASSESSMENT — PAIN DESCRIPTION - PAIN TYPE
TYPE: SURGICAL PAIN
TYPE: SURGICAL PAIN
TYPE: ACUTE PAIN;SURGICAL PAIN
TYPE: SURGICAL PAIN

## 2017-12-01 NOTE — PLAN OF CARE
Problem: Falls - Risk of  Goal: Absence of falls  Outcome: Met This Shift  No falls noted this shift. Patient ambulates with x 2 staff assistance without difficulty. Family member at bedside, spent the night. Bed kept in low position. Safe environment maintained. Bedside table & call light in reach. Uses call light appropriately when needing assistance. Fall band and sign posted for safety. Bed and chair alarms in place    Problem: Pain:  Goal: Pain level will decrease  Pain level will decrease   Outcome: Ongoing  Pt states pain goal of 5. Pt with post-op pain of 8. Taking IV morphine for pain as needed. Pain decreases from 8 to 5. Pain goal met at this time    Problem: Cardiovascular  Goal: No DVT, peripheral vascular complications  Outcome: Met This Shift  No chest pain, shortness of breath or edema. pcds and lovenox ordered for dvt prophylaxis. Pt ambulatory with 2 assists    Problem: Respiratory  Goal: O2 Sat > 90%  Outcome: Met This Shift  No supplemental oxygen needed as saturations are 95-97 % on room air. Pt enc to cough and deep breath and use incentive spirometer    Problem: GI  Goal: No bowel complications  Outcome: Ongoing  NG tube remains in right nares, but is clamped at this time. Pt has bouts of nausea, but no emesis. zofran given as needed for nausea. Problem:   Goal: Adequate urinary output  Outcome: Met This Shift  Berumen catheter draining clear yellow urine without difficulty greater than 30ml/hr    Problem: Nutrition  Goal: Optimal nutrition therapy  Outcome: Not Met This Shift  Pt remains npo except ice and sips with meds. NG tube clamped in right nares. IV of 0.9 infusing at 75ml/hr    Problem: Skin Integrity/Risk  Goal: Wound healing  Outcome: Met This Shift  Midline abdominal incision open to air with staples intact. No redness, edema or drainage noted. Problem: HH SN NASOGASTRIC INSERTION  Intervention: ASSESS BOWEL SOUNDS  NG tube to right nares intact. Clamped at this time. Problem: Discharge Planning:  Goal: Discharged to appropriate level of care  Discharged to appropriate level of care   Outcome: Ongoing  Discharge plans may include needing home health services at discharge    Comments: Care plan reviewed with patient . Patient  verbalize understanding of the plan of care and contribute to goal setting.

## 2017-12-01 NOTE — PLAN OF CARE
Problem: Falls - Risk of  Goal: Absence of falls  Outcome: Ongoing  Patient free from falls this shift, bed alarm on, patient instructed to use call light to get up    Problem: Pain:  Goal: Pain level will decrease  Pain level will decrease   Outcome: Ongoing  Patient states pain is consistently at 9 out of 10 on anne scale. Pain medication given and reduces to 7 out of 10 , pain goal is 5    Problem: Cardiovascular  Goal: No DVT, peripheral vascular complications  Outcome: Ongoing  Patient has no signs or symptoms of dvt, wearing scds and feet elevated off of bed    Problem: Respiratory  Goal: O2 Sat > 90%  Outcome: Ongoing  Patient oxygen is 96% on room air    Problem: GI  Goal: No bowel complications  Outcome: Ongoing  Patient bowel sounds are hypoactive, not passing gas    Problem:   Goal: Adequate urinary output  Outcome: Ongoing  Patient has nguyen catheter and has adequate urinary output  Goal: No urinary complication  Outcome: Ongoing  Patient has adequate urinary output with nguyen catheter    Problem: Nutrition  Goal: Optimal nutrition therapy  Outcome: Ongoing  Patient is only taking in small amounts of ice    Problem: Skin Integrity/Risk  Goal: Wound healing  Outcome: Ongoing  Patient has midline incision that is dry and intact with curt drain    Problem: Discharge Planning:  Goal: Discharged to appropriate level of care  Discharged to appropriate level of care   Outcome: Ongoing  Discharge arrangements to be made closer to discharge    Comments: Care plan reviewed with patient. Patient verbalize understanding of the plan of care and contribute to goal setting.

## 2017-12-01 NOTE — PROGRESS NOTES
Spoke with Michelene Councilman re: ng tube removal. Pt has nausea off and on. Order to clamp ng received instead of removing ng. And if pt tolerates clamping ,may remove later today or tomorrow.

## 2017-12-01 NOTE — PROGRESS NOTES
11/29/17   0936   AST  23   ALT  13   BILITOT  0.3   LACTA  1.1     Recent Labs      11/29/17   0936   TROPONINT  < 0.010     RADIOLOGY     FL UGI [[id number]] Resulted: 12/01/17 1146   Updated: 12/01/17 1148    Narrative:     PROCEDURE: [place] UGI    CLINICAL INFORMATION: S/p gastric ulcer repair, r/o leak,  .    COMPARISON: No prior study. TECHNIQUE: The study was done with the dilute Gastrografin and thin barium. Initially the patient was administered dilute Gastrografin orally using a straw subsequently additional dilute Gastrografin was injected through the NG tube. Patient was also   administered thin barium through the NG tube. NG tube is flushed after use. Overhead images were done. A delayed upright abdomen was done. FINDINGS: Marked decreased peristalsis of the stomach. Extensive spontaneous gastroesophageal reflux. Eventual passage of barium and Gastrografin to the small bowel. No evidence of leak. On delayed image there is a still of contrast in the gastric cardia   and fundus. Free air seen below the hemidiaphragm consistent with recent surgery. Impression:     No evidence of the postsurgical gastric leak. Severe spontaneous gastroesophageal reflux. Electronically signed by Claire Yates CNP on 12/1/2017 at 8:17 AM Patient seen and examined independently by me. Above discussed and I agree with CNP. Labs, cultures, and radiographs where available were reviewed. See orders for the updated patient care plan.     Prasad aCmpos MD, UGI OK d/c ng  abd soft  12/1/2017   1:55 PM

## 2017-12-02 PROBLEM — D62 ACUTE BLOOD LOSS AS CAUSE OF POSTOPERATIVE ANEMIA: Status: ACTIVE | Noted: 2017-12-02

## 2017-12-02 LAB
ANION GAP SERPL CALCULATED.3IONS-SCNC: 16 MEQ/L (ref 8–16)
BUN BLDV-MCNC: 19 MG/DL (ref 7–22)
CALCIUM SERPL-MCNC: 7.4 MG/DL (ref 8.5–10.5)
CHLORIDE BLD-SCNC: 110 MEQ/L (ref 98–111)
CO2: 20 MEQ/L (ref 23–33)
CREAT SERPL-MCNC: 0.6 MG/DL (ref 0.4–1.2)
GFR SERPL CREATININE-BSD FRML MDRD: > 90 ML/MIN/1.73M2
GLUCOSE BLD-MCNC: 79 MG/DL (ref 70–108)
HCT VFR BLD CALC: 25.7 % (ref 37–47)
HEMOGLOBIN: 8.5 GM/DL (ref 12–16)
MCH RBC QN AUTO: 30.8 PG (ref 27–31)
MCHC RBC AUTO-ENTMCNC: 33 GM/DL (ref 33–37)
MCV RBC AUTO: 93.6 FL (ref 81–99)
PDW BLD-RTO: 14.7 % (ref 11.5–14.5)
PLATELET # BLD: 172 THOU/MM3 (ref 130–400)
PMV BLD AUTO: 8.6 MCM (ref 7.4–10.4)
POTASSIUM SERPL-SCNC: 3.5 MEQ/L (ref 3.5–5.2)
RBC # BLD: 2.74 MILL/MM3 (ref 4.2–5.4)
SODIUM BLD-SCNC: 146 MEQ/L (ref 135–145)
WBC # BLD: 5.7 THOU/MM3 (ref 4.8–10.8)

## 2017-12-02 PROCEDURE — 6360000002 HC RX W HCPCS: Performed by: SURGERY

## 2017-12-02 PROCEDURE — 6360000002 HC RX W HCPCS: Performed by: FAMILY MEDICINE

## 2017-12-02 PROCEDURE — 1200000000 HC SEMI PRIVATE

## 2017-12-02 PROCEDURE — 2580000003 HC RX 258: Performed by: SURGERY

## 2017-12-02 PROCEDURE — 99024 POSTOP FOLLOW-UP VISIT: CPT | Performed by: SURGERY

## 2017-12-02 PROCEDURE — 36415 COLL VENOUS BLD VENIPUNCTURE: CPT

## 2017-12-02 PROCEDURE — 85027 COMPLETE CBC AUTOMATED: CPT

## 2017-12-02 PROCEDURE — 6370000000 HC RX 637 (ALT 250 FOR IP): Performed by: SURGERY

## 2017-12-02 PROCEDURE — C9113 INJ PANTOPRAZOLE SODIUM, VIA: HCPCS | Performed by: FAMILY MEDICINE

## 2017-12-02 PROCEDURE — 80048 BASIC METABOLIC PNL TOTAL CA: CPT

## 2017-12-02 RX ORDER — HYDROCODONE BITARTRATE AND ACETAMINOPHEN 5; 325 MG/1; MG/1
1 TABLET ORAL EVERY 6 HOURS PRN
Status: DISCONTINUED | OUTPATIENT
Start: 2017-12-02 | End: 2017-12-07 | Stop reason: HOSPADM

## 2017-12-02 RX ORDER — METOCLOPRAMIDE HYDROCHLORIDE 5 MG/ML
10 INJECTION INTRAMUSCULAR; INTRAVENOUS EVERY 8 HOURS
Status: DISCONTINUED | OUTPATIENT
Start: 2017-12-02 | End: 2017-12-06

## 2017-12-02 RX ORDER — PROMETHAZINE HYDROCHLORIDE 25 MG/ML
12.5 INJECTION, SOLUTION INTRAMUSCULAR; INTRAVENOUS EVERY 6 HOURS PRN
Status: DISCONTINUED | OUTPATIENT
Start: 2017-12-02 | End: 2017-12-07 | Stop reason: HOSPADM

## 2017-12-02 RX ADMIN — PROMETHAZINE HYDROCHLORIDE 12.5 MG: 25 INJECTION, SOLUTION INTRAMUSCULAR; INTRAVENOUS at 23:22

## 2017-12-02 RX ADMIN — SODIUM CHLORIDE: 9 INJECTION, SOLUTION INTRAVENOUS at 00:52

## 2017-12-02 RX ADMIN — SODIUM CHLORIDE: 9 INJECTION, SOLUTION INTRAVENOUS at 14:55

## 2017-12-02 RX ADMIN — MORPHINE SULFATE 2 MG: 2 INJECTION, SOLUTION INTRAMUSCULAR; INTRAVENOUS at 11:13

## 2017-12-02 RX ADMIN — MORPHINE SULFATE 4 MG: 4 INJECTION INTRAVENOUS at 00:12

## 2017-12-02 RX ADMIN — ONDANSETRON 4 MG: 2 INJECTION INTRAMUSCULAR; INTRAVENOUS at 19:12

## 2017-12-02 RX ADMIN — Medication 10 ML: at 08:30

## 2017-12-02 RX ADMIN — METOCLOPRAMIDE 10 MG: 5 INJECTION, SOLUTION INTRAMUSCULAR; INTRAVENOUS at 08:24

## 2017-12-02 RX ADMIN — ONDANSETRON 4 MG: 2 INJECTION INTRAMUSCULAR; INTRAVENOUS at 08:29

## 2017-12-02 RX ADMIN — MORPHINE SULFATE 2 MG: 2 INJECTION, SOLUTION INTRAMUSCULAR; INTRAVENOUS at 08:29

## 2017-12-02 RX ADMIN — METOCLOPRAMIDE 10 MG: 5 INJECTION, SOLUTION INTRAMUSCULAR; INTRAVENOUS at 23:22

## 2017-12-02 RX ADMIN — Medication 10 ML: at 08:27

## 2017-12-02 RX ADMIN — ENOXAPARIN SODIUM 40 MG: 40 INJECTION SUBCUTANEOUS at 13:11

## 2017-12-02 RX ADMIN — PANTOPRAZOLE SODIUM 40 MG: 40 INJECTION, POWDER, FOR SOLUTION INTRAVENOUS at 08:27

## 2017-12-02 RX ADMIN — MORPHINE SULFATE 4 MG: 4 INJECTION INTRAVENOUS at 19:51

## 2017-12-02 RX ADMIN — METOCLOPRAMIDE 10 MG: 5 INJECTION, SOLUTION INTRAMUSCULAR; INTRAVENOUS at 15:38

## 2017-12-02 RX ADMIN — HYDROCODONE BITARTRATE AND ACETAMINOPHEN 1 TABLET: 5; 325 TABLET ORAL at 13:31

## 2017-12-02 ASSESSMENT — PAIN DESCRIPTION - PAIN TYPE
TYPE: SURGICAL PAIN
TYPE: SURGICAL PAIN;ACUTE PAIN
TYPE: SURGICAL PAIN
TYPE: SURGICAL PAIN
TYPE: SURGICAL PAIN;ACUTE PAIN

## 2017-12-02 ASSESSMENT — PAIN SCALES - GENERAL
PAINLEVEL_OUTOF10: 6
PAINLEVEL_OUTOF10: 7
PAINLEVEL_OUTOF10: 8
PAINLEVEL_OUTOF10: 5
PAINLEVEL_OUTOF10: 0
PAINLEVEL_OUTOF10: 6
PAINLEVEL_OUTOF10: 10
PAINLEVEL_OUTOF10: 3
PAINLEVEL_OUTOF10: 6
PAINLEVEL_OUTOF10: 9
PAINLEVEL_OUTOF10: 8
PAINLEVEL_OUTOF10: 3

## 2017-12-02 ASSESSMENT — PAIN DESCRIPTION - PROGRESSION
CLINICAL_PROGRESSION: NOT CHANGED
CLINICAL_PROGRESSION: NOT CHANGED

## 2017-12-02 ASSESSMENT — PAIN DESCRIPTION - ORIENTATION
ORIENTATION: MID

## 2017-12-02 ASSESSMENT — PAIN DESCRIPTION - DESCRIPTORS
DESCRIPTORS: SORE
DESCRIPTORS: SORE;CRAMPING
DESCRIPTORS: SORE
DESCRIPTORS: SORE
DESCRIPTORS: SORE;CRAMPING

## 2017-12-02 ASSESSMENT — PAIN DESCRIPTION - LOCATION
LOCATION: ABDOMEN

## 2017-12-02 ASSESSMENT — PAIN DESCRIPTION - FREQUENCY
FREQUENCY: CONTINUOUS
FREQUENCY: CONTINUOUS

## 2017-12-02 ASSESSMENT — PAIN DESCRIPTION - ONSET
ONSET: PROGRESSIVE
ONSET: PROGRESSIVE

## 2017-12-02 NOTE — PROGRESS NOTES
Moise Rice is a 80 y.o. female patient.     Current Facility-Administered Medications   Medication Dose Route Frequency Provider Last Rate Last Dose    diatrizoate meglumine-sodium (GASTROGRAFIN) 66-10 % solution 120 mL  120 mL Oral ONCE PRN Yuki Shaker Wisser, DO   120 mL at 12/01/17 8141    pantoprazole (PROTONIX) injection 40 mg  40 mg Intravenous Daily Susana Ro MD   40 mg at 12/01/17 5501    PRESERVISION AREDS 2 CAPS 1 capsule  1 capsule Oral BID Yuki Shaker Wisser, DO        sodium chloride flush 0.9 % injection 10 mL  10 mL Intravenous 2 times per day Yuki Shaker Wisser, DO        sodium chloride flush 0.9 % injection 10 mL  10 mL Intravenous PRN Yuki Shaker Wisser, DO        0.9 % sodium chloride infusion   Intravenous Continuous Yuki Shaker Wisser, DO 75 mL/hr at 12/01/17 1251      sodium chloride flush 0.9 % injection 10 mL  10 mL Intravenous 2 times per day Yuki Shaker Wisser, DO   10 mL at 12/01/17 3295    sodium chloride flush 0.9 % injection 10 mL  10 mL Intravenous PRN Yuki Shaker Wisser, DO        acetaminophen (TYLENOL) tablet 650 mg  650 mg Oral Q4H PRN Yuki Shaker Wisser, DO        ondansetron [facility]) injection 4 mg  4 mg Intravenous Q6H PRN Yuki Shaker Wisser, DO   4 mg at 12/01/17 1435    enoxaparin (LOVENOX) injection 40 mg  40 mg Subcutaneous Q24H Yuki Shaker Wisser, DO   40 mg at 12/01/17 1336    morphine injection 2 mg  2 mg Intravenous Q2H PRN Yuki Shaker Wisser, DO   2 mg at 11/30/17 0930    Or    morphine (PF) injection 4 mg  4 mg Intravenous Q2H PRN Yuki Shaker Wisser, DO   4 mg at 12/01/17 1153    morphine injection 2 mg  2 mg Intravenous Q10 Min PRN Theta Stephanie, DO   2 mg at 11/29/17 1928     Allergies   Allergen Reactions    Amoxicillin-Pot Clavulanate Diarrhea    Statins Support Therapy      Unaware of allery    Sulfa Antibiotics Nausea Only     Active Problems:    Pneumoperitoneum    Blood pressure (!) 140/65, pulse 75, temperature 98.1 °F (36.7 °C), temperature source Oral, resp. rate 15, height 5' 3\" (1.6 m), weight 115 lb (52.2 kg), SpO2 100 %, not currently breastfeeding. Subjective:  Symptoms:  Stable. No anxiety. Diet:  NPO. Activity level: Impaired due to weakness. Pain:  She complains of pain that is mild. Objective:  General Appearance:  Comfortable. Vital signs: (most recent): Blood pressure (!) 140/65, pulse 75, temperature 98.1 °F (36.7 °C), temperature source Oral, resp. rate 15, height 5' 3\" (1.6 m), weight 115 lb (52.2 kg), SpO2 100 %, not currently breastfeeding. Vital signs are normal.    Output: Producing urine. HEENT: Normal HEENT exam.    Lungs:  Normal effort and normal respiratory rate. Breath sounds clear to auscultation. Heart: Normal rate. Regular rhythm. S1 normal and S2 normal.  No murmur. Abdomen: Abdomen is soft. (Incision clean and  dry). Bowel sounds are normal.   There is no abdominal tenderness. Extremities: Normal range of motion. Neurological: Patient is alert and oriented to person, place and time. Patient has normal reflexes and normal muscle tone. Assessment:    Condition: In stable condition. Improving. (Gastric  Ulcer  Perforation  Stable      anemia  Blood loss  Stable      elevated  bp  Stable      osteoporosis  Stable ). Plan:   Out of bed and up to chair.  NPO. Administer medications as ordered. (Follow  Lab      watch  bp       iv  protonix).        Susana Ro MD  12/1/2017

## 2017-12-02 NOTE — PROGRESS NOTES
Patient: [facility]ician[facility]  Unit/Bed: 2F-84/290-N  YOB: 1931  MRN: [id number] Acct: [de-identified]   Admitting Diagnosis: Pneumoperitoneum [K66.8]  Admit Date:  11/29/2017  Hospital Day: 3    Assessment:     Active Problems:    Anxiety    Depression    GERD (gastroesophageal reflux disease)    Pneumoperitoneum    Acute blood loss as cause of postoperative anemia      Plan:     Continue to follow medically        Subjective:     Patient has no complaint of CP, SOB, or GI upset. She has not voided since the cath had come out this morning. Medication side effects: none    Scheduled Meds:   metoclopramide  10 mg Intravenous Q8H    pantoprazole  40 mg Intravenous Daily    PRESERVISION AREDS 2  1 capsule Oral BID    sodium chloride flush  10 mL Intravenous 2 times per day    sodium chloride flush  10 mL Intravenous 2 times per day    enoxaparin  40 mg Subcutaneous Q24H     Continuous Infusions:   sodium chloride 75 mL/hr at 12/02/17 0052     PRN Meds:HYDROcodone 5 mg - acetaminophen, diatrizoate meglumine-sodium, sodium chloride flush, sodium chloride flush, acetaminophen, ondansetron, morphine **OR** morphine, morphine    Review of Systems  Pertinent items are noted in HPI. Objective:     Patient Vitals for the past 8 hrs:   BP Temp Temp src Pulse Resp SpO2   12/02/17 1138 (!) 163/77 98.9 °F (37.2 °C) Oral 74 16 96 %   12/02/17 0812 (!) 174/76 98 °F (36.7 °C) Oral 72 16 99 %     I/O last 3 completed shifts: In: 1956 [P.O.:175;  I.V.:1781]  Out: 1680 [Urine:1100; Emesis/NG output:250; Drains:330]  I/O this shift:  In: 120 [P.O.:120]  Out: 240 [Drains:240]    BP (!) 163/77   Pulse 74   Temp 98.9 °F (37.2 °C) (Oral)   Resp 16   Ht 5' 3\" (1.6 m)   Wt 115 lb (52.2 kg)   SpO2 96%   BMI 20.37 kg/m²     BP (!) 163/77   Pulse 74   Temp 98.9 °F (37.2 °C) (Oral)   Resp 16   Ht 5' 3\" (1.6 m)   Wt 115 lb (52.2 kg)   SpO2 96%   BMI 20.37 kg/m²   General appearance: alert, appears stated age and cooperative  Lungs: clear to auscultation bilaterally  Heart: regular rate and rhythm, S1, S2 normal, no murmur, click, rub or gallop  Abdomen: soft, non-tender; bowel sounds normal; no masses,  no organomegaly and soft, somewhat distended, active BS and generally mild tenderness  Extremities: extremities normal, atraumatic, no cyanosis or edema  Skin: Skin color, texture, turgor normal. No rashes or lesions  Neurologic: Grossly normal    Data Review:   Results for Wilmar Fox (MRN [id number]) as of 12/2/2017 14:35   Ref.  Range 11/30/2017 06:37 12/1/2017 05:51 12/2/2017 05:33   Sodium Latest Ref Range: 135 - 145 meq/L 143 142 146 (H)   Potassium Latest Ref Range: 3.5 - 5.2 meq/L 3.9 3.7 3.5   Chloride Latest Ref Range: 98 - 111 meq/L 109 108 110   CO2 Latest Ref Range: 23 - 33 meq/L 19 (L) 24 20 (L)   BUN Latest Ref Range: 7 - 22 mg/dL 30 (H) 18 19   Creatinine Latest Ref Range: 0.4 - 1.2 mg/dL 0.9 0.7 0.6   Anion Gap Latest Ref Range: 8.0 - 16.0 meq/L 15.0 10.0 16.0   Est, Glom Filt Rate Latest Units: ml/min/1.73m2 59 (A) 79 (A) >90   Glucose Latest Ref Range: 70 - 108 mg/dL 125 (H) 91 79   Calcium Latest Ref Range: 8.5 - 10.5 mg/dL 7.5 (L) 7.5 (L) 7.4 (L)   WBC Latest Ref Range: 4.8 - 10.8 thou/mm3 8.5 6.3 5.7   RBC Latest Ref Range: 4.20 - 5.40 mill/mm3 3.08 (L) 2.82 (L) 2.74 (L)   Hemoglobin Quant Latest Ref Range: 12.0 - 16.0 gm/dl 9.3 (L) 8.7 (L) 8.5 (L)   Hematocrit Latest Ref Range: 37.0 - 47.0 % 28.8 (L) 26.2 (L) 25.7 (L)   MCV Latest Ref Range: 81.0 - 99.0 fL 93.5 93.1 93.6   MCH Latest Ref Range: 27.0 - 31.0 pg 30.4 30.8 30.8   MCHC Latest Ref Range: 33.0 - 37.0 gm/dl 32.5 (L) 33.1 33.0   MPV Latest Ref Range: 7.4 - 10.4 mcm 8.2 8.5 8.6   RDW Latest Ref Range: 11.5 - 14.5 % 14.9 (H) 15.0 (H) 14.7 (H)   Platelet Count Latest Ref Range: 130 - 400 thou/mm3 196 170 172       Electronically signed by Jeyson Romeo MD on 12/2/2017 at 2:44 PM

## 2017-12-02 NOTE — PROGRESS NOTES
[facility]  General Surgery  Vish Topete covering for Dr Elissa Bowers  Postoperative Progress Note    Pt Name: Adelina Clifford Record Number: [id number]  Date of Birth 11/12/1931   Today's Date: 12/2/2017  ASSESSMENT   1. POD # 3 repair of perforated gastric ulcer  2. Surgical Incision looks good no drainage  3. Pain controlled. Tolerating NG clamped  4. UGI - no leak identified -Severe spontaneous gastroesophageal reflux     has a past medical history of Alcohol abuse; Anxiety; Colon polyp; Depression; GERD (gastroesophageal reflux disease); Hyperlipidemia; Hypothyroid; Neuropathy, peripheral ([address] Osteoarthritis; Osteoporosis; Thyroid disease; and Vitamin D deficiency. PLAN   1. Analgesics and antiemetics as needed  2. IV hydration  3. Clear liquids  4. Remove NG and Berumen catheter  5. Antacid therapy  6. Increase activity  7. Lovenox for DVT prophylaxis  8. PT OT for deconditioning increase activity  SUBJECTIVE   Yamilex Atkins is doing fairly well. Nausea has improved/  She denies any vomiting, has not passed flatus or had a bowel movement. She is tolerating a DIET CLEAR LIQUID;. Her pain is well controlled on current medications. She is in bed, not moving well  CURRENT MEDICATIONS   Scheduled Meds:   metoclopramide  10 mg Intravenous Q8H    pantoprazole  40 mg Intravenous Daily    PRESERVISION AREDS 2  1 capsule Oral BID    sodium chloride flush  10 mL Intravenous 2 times per day    sodium chloride flush  10 mL Intravenous 2 times per day    enoxaparin  40 mg Subcutaneous Q24H     Continuous Infusions:   sodium chloride 75 mL/hr at 12/02/17 0052     PRN Meds:.diatrizoate meglumine-sodium, sodium chloride flush, sodium chloride flush, acetaminophen, ondansetron, morphine **OR** morphine, morphine  OBJECTIVE   CURRENT VITALS:  height is 5' 3\" (1.6 m) and weight is 115 lb (52.2 kg). Her oral temperature is 97.5 °F (36.4 °C). Her blood pressure is 178/75 (abnormal) and her pulse is 74.  Her respiration is 16 and oxygen saturation is 96%. Temperature Range (24h):Temp: 97.5 °F (36.4 °C) Temp  Av.9 °F (36.6 °C)  Min: 97 °F (36.1 °C)  Max: 98.8 °F (37.1 °C)  BP Range (45J): Systolic (22FKW), CAT:447 , Min:140 , CCT:216     Diastolic (49YOA), EUC:98, Min:64, Max:80    Pulse Range (24h): Pulse  Av  Min: 68  Max: 79  Respiration Range (24h): Resp  Av.2  Min: 15  Max: 18  Current Pulse Ox (24h):  SpO2: 96 %  Pulse Ox Range (24h):  SpO2  Av.2 %  Min: 96 %  Max: 100 %  Oxygen Amount and Delivery: O2 Flow Rate (L/min): 4 L/min  Incentive Spirometry Tx:            GENERAL: alert, no distress  LUNGS: clear to ausculation, without wheezes, rales or rhonci  HEART: normal rate and regular rhythm  ABDOMEN: non-distended, soft, incisional tenderness, bowel sounds present  INCISION: healing well, no significant drainage, no significant erythema  DRAIN: serosanguinous fair amount  EXTERMITY: no cyanosis, clubbing or edema  In: 1321 [P.O.:125; I.V.:1196]  Out: 960 [Urine:700; Drains:260]  Closed/Suction Drain Right Abdomen Bulb 19 Chinese-Output (ml): 150 ml    Date 17 0000 - 17 2358   Shift 4107-4414 2991-1114 4665-1992 24 Hour Total   I  N  T  A  K  E   P.O.  (mL/kg/hr) 75   75    I.V.  (mL/kg) 599  (11.5)   599  (11.5)    Shift Total  (mL/kg) 674  (12.9)   674  (12.9)   O  U  T  P  U  T   Urine  (mL/kg/hr) 350   350    Drains  (mL/kg) 150  (2.9)   150  (2.9)    Shift Total  (mL/kg) 500  (9.6)   500  (9.6)   Weight (kg) 52.2 52.2 52.2 52.2     LABS     Recent Labs      17   0637  17   0551  17   0533   WBC  8.5  6.3  5.7   HGB  9.3*  8.7*  8.5*   HCT  28.8*  26.2*  25.7*   PLT  196  170  172   NA  143  142  146*   K  3.9  3.7  3.5   CL  109  108  110   CO2  19*  24  20*   BUN  30*  18  19   CREATININE  0.9  0.7  0.6   CALCIUM  7.5*  7.5*  7.4*      No results for input(s): PTT, INR in the last 72 hours.     Invalid input(s): PT  Recent Labs      17   0936   AST  23 ALT  13   BILITOT  0.3   LACTA  1.1     Recent Labs      11/29/17   0936   TROPONINT  < 0.010     RADIOLOGY     FL UGI [[id number]] Resulted: 12/01/17 1146   Updated: 12/01/17 1148    Narrative:     PROCEDURE: [place] UGI    CLINICAL INFORMATION: S/p gastric ulcer repair, r/o leak,  .    COMPARISON: No prior study. TECHNIQUE: The study was done with the dilute Gastrografin and thin barium. Initially the patient was administered dilute Gastrografin orally using a straw subsequently additional dilute Gastrografin was injected through the NG tube. Patient was also   administered thin barium through the NG tube. NG tube is flushed after use. Overhead images were done. A delayed upright abdomen was done. FINDINGS: Marked decreased peristalsis of the stomach. Extensive spontaneous gastroesophageal reflux. Eventual passage of barium and Gastrografin to the small bowel. No evidence of leak. On delayed image there is a still of contrast in the gastric cardia   and fundus. Free air seen below the hemidiaphragm consistent with recent surgery. Impression:     No evidence of the postsurgical gastric leak. Severe spontaneous gastroesophageal reflux.            Electronically signed by Jose Horowitz MD on 12/2/2017 at 7:51 AM

## 2017-12-03 PROBLEM — I10 HYPERTENSION: Status: ACTIVE | Noted: 2017-12-03

## 2017-12-03 PROBLEM — E87.6 HYPOKALEMIA: Status: ACTIVE | Noted: 2017-12-03

## 2017-12-03 LAB
ANION GAP SERPL CALCULATED.3IONS-SCNC: 15 MEQ/L (ref 8–16)
BUN BLDV-MCNC: 19 MG/DL (ref 7–22)
CALCIUM SERPL-MCNC: 7.5 MG/DL (ref 8.5–10.5)
CHLORIDE BLD-SCNC: 105 MEQ/L (ref 98–111)
CO2: 22 MEQ/L (ref 23–33)
CREAT SERPL-MCNC: 0.7 MG/DL (ref 0.4–1.2)
GFR SERPL CREATININE-BSD FRML MDRD: 79 ML/MIN/1.73M2
GLUCOSE BLD-MCNC: 149 MG/DL (ref 70–108)
HCT VFR BLD CALC: 30 % (ref 37–47)
HEMOGLOBIN: 9.9 GM/DL (ref 12–16)
POTASSIUM SERPL-SCNC: 3.3 MEQ/L (ref 3.5–5.2)
SODIUM BLD-SCNC: 142 MEQ/L (ref 135–145)

## 2017-12-03 PROCEDURE — 6360000002 HC RX W HCPCS: Performed by: FAMILY MEDICINE

## 2017-12-03 PROCEDURE — 6370000000 HC RX 637 (ALT 250 FOR IP): Performed by: FAMILY MEDICINE

## 2017-12-03 PROCEDURE — 85018 HEMOGLOBIN: CPT

## 2017-12-03 PROCEDURE — 2580000003 HC RX 258: Performed by: SURGERY

## 2017-12-03 PROCEDURE — 6370000000 HC RX 637 (ALT 250 FOR IP): Performed by: SURGERY

## 2017-12-03 PROCEDURE — 1200000000 HC SEMI PRIVATE

## 2017-12-03 PROCEDURE — C9113 INJ PANTOPRAZOLE SODIUM, VIA: HCPCS | Performed by: FAMILY MEDICINE

## 2017-12-03 PROCEDURE — 99232 SBSQ HOSP IP/OBS MODERATE 35: CPT | Performed by: FAMILY MEDICINE

## 2017-12-03 PROCEDURE — 80048 BASIC METABOLIC PNL TOTAL CA: CPT

## 2017-12-03 PROCEDURE — 85014 HEMATOCRIT: CPT

## 2017-12-03 PROCEDURE — 6360000002 HC RX W HCPCS: Performed by: SURGERY

## 2017-12-03 PROCEDURE — 36415 COLL VENOUS BLD VENIPUNCTURE: CPT

## 2017-12-03 RX ORDER — POTASSIUM CHLORIDE 20 MEQ/1
40 TABLET, EXTENDED RELEASE ORAL ONCE
Status: COMPLETED | OUTPATIENT
Start: 2017-12-03 | End: 2017-12-03

## 2017-12-03 RX ORDER — LORAZEPAM 0.5 MG/1
0.5 TABLET ORAL 2 TIMES DAILY PRN
Status: DISCONTINUED | OUTPATIENT
Start: 2017-12-03 | End: 2017-12-07 | Stop reason: HOSPADM

## 2017-12-03 RX ADMIN — METOPROLOL TARTRATE 12.5 MG: 25 TABLET ORAL at 21:30

## 2017-12-03 RX ADMIN — SODIUM CHLORIDE: 9 INJECTION, SOLUTION INTRAVENOUS at 04:04

## 2017-12-03 RX ADMIN — ONDANSETRON 4 MG: 2 INJECTION INTRAMUSCULAR; INTRAVENOUS at 03:04

## 2017-12-03 RX ADMIN — METOCLOPRAMIDE 10 MG: 5 INJECTION, SOLUTION INTRAMUSCULAR; INTRAVENOUS at 17:15

## 2017-12-03 RX ADMIN — ENOXAPARIN SODIUM 40 MG: 40 INJECTION SUBCUTANEOUS at 12:54

## 2017-12-03 RX ADMIN — MORPHINE SULFATE 2 MG: 2 INJECTION, SOLUTION INTRAMUSCULAR; INTRAVENOUS at 10:16

## 2017-12-03 RX ADMIN — LORAZEPAM 0.5 MG: 0.5 TABLET ORAL at 21:33

## 2017-12-03 RX ADMIN — POTASSIUM CHLORIDE 40 MEQ: 1500 TABLET, EXTENDED RELEASE ORAL at 14:26

## 2017-12-03 RX ADMIN — Medication 1 CAPSULE: at 21:29

## 2017-12-03 RX ADMIN — ONDANSETRON 4 MG: 2 INJECTION INTRAMUSCULAR; INTRAVENOUS at 12:53

## 2017-12-03 RX ADMIN — LORAZEPAM 0.5 MG: 0.5 TABLET ORAL at 14:22

## 2017-12-03 RX ADMIN — Medication 10 ML: at 08:09

## 2017-12-03 RX ADMIN — PANTOPRAZOLE SODIUM 40 MG: 40 INJECTION, POWDER, FOR SOLUTION INTRAVENOUS at 08:07

## 2017-12-03 RX ADMIN — SODIUM CHLORIDE: 9 INJECTION, SOLUTION INTRAVENOUS at 17:19

## 2017-12-03 RX ADMIN — METOPROLOL TARTRATE 12.5 MG: 25 TABLET ORAL at 14:22

## 2017-12-03 RX ADMIN — Medication 10 ML: at 08:07

## 2017-12-03 RX ADMIN — MORPHINE SULFATE 2 MG: 2 INJECTION, SOLUTION INTRAMUSCULAR; INTRAVENOUS at 08:07

## 2017-12-03 RX ADMIN — PROMETHAZINE HYDROCHLORIDE 12.5 MG: 25 INJECTION, SOLUTION INTRAMUSCULAR; INTRAVENOUS at 05:44

## 2017-12-03 RX ADMIN — METOCLOPRAMIDE 10 MG: 5 INJECTION, SOLUTION INTRAMUSCULAR; INTRAVENOUS at 10:15

## 2017-12-03 ASSESSMENT — PAIN DESCRIPTION - DESCRIPTORS
DESCRIPTORS: SORE
DESCRIPTORS: SORE;CRAMPING

## 2017-12-03 ASSESSMENT — PAIN DESCRIPTION - LOCATION
LOCATION: ABDOMEN
LOCATION: ABDOMEN

## 2017-12-03 ASSESSMENT — PAIN DESCRIPTION - PROGRESSION: CLINICAL_PROGRESSION: NOT CHANGED

## 2017-12-03 ASSESSMENT — PAIN SCALES - GENERAL
PAINLEVEL_OUTOF10: 4
PAINLEVEL_OUTOF10: 10
PAINLEVEL_OUTOF10: 8
PAINLEVEL_OUTOF10: 7
PAINLEVEL_OUTOF10: 6
PAINLEVEL_OUTOF10: 4

## 2017-12-03 ASSESSMENT — PAIN DESCRIPTION - PAIN TYPE
TYPE: SURGICAL PAIN;ACUTE PAIN
TYPE: SURGICAL PAIN

## 2017-12-03 ASSESSMENT — PAIN DESCRIPTION - ONSET: ONSET: ON-GOING

## 2017-12-03 ASSESSMENT — PAIN DESCRIPTION - ORIENTATION: ORIENTATION: MID

## 2017-12-03 ASSESSMENT — PAIN DESCRIPTION - FREQUENCY: FREQUENCY: CONTINUOUS

## 2017-12-03 NOTE — PROGRESS NOTES
98.3 °F (36.8 °C) (Oral)   Resp 16   Ht 5' 3\" (1.6 m)   Wt 115 lb (52.2 kg)   SpO2 95%   BMI 20.37 kg/m²     BP (!) 180/80   Pulse 78   Temp 98.3 °F (36.8 °C) (Oral)   Resp 16   Ht 5' 3\" (1.6 m)   Wt 115 lb (52.2 kg)   SpO2 95%   BMI 20.37 kg/m²   General appearance: alert, appears stated age, cooperative and anxious  Lungs: clear to auscultation bilaterally  Heart: regular rate and rhythm, S1, S2 normal, no murmur, click, rub or gallop  Abdomen: hypoactive BS, somewhat distended, tympanic without point tenderness  Extremities: extremities normal, atraumatic, no cyanosis or edema  Skin: Skin color, texture, turgor normal. No rashes or lesions  Neurologic: Grossly normal    Data Review:   Results for Samanta Acharya (MRN [id number]) as of 12/3/2017 12:38   Ref.  Range 12/1/2017 05:51 12/1/2017 10:43 12/2/2017 05:33 12/3/2017 06:05   Sodium Latest Ref Range: 135 - 145 meq/L 142  146 (H) 142   Potassium Latest Ref Range: 3.5 - 5.2 meq/L 3.7  3.5 3.3 (L)   Chloride Latest Ref Range: 98 - 111 meq/L 108  110 105   CO2 Latest Ref Range: 23 - 33 meq/L 24  20 (L) 22 (L)   BUN Latest Ref Range: 7 - 22 mg/dL 18  19 19   Creatinine Latest Ref Range: 0.4 - 1.2 mg/dL 0.7  0.6 0.7   Anion Gap Latest Ref Range: 8.0 - 16.0 meq/L 10.0  16.0 15.0   Est, Glom Filt Rate Latest Units: ml/min/1.73m2 79 (A)  >90 79 (A)   Glucose Latest Ref Range: 70 - 108 mg/dL 91  79 149 (H)   Calcium Latest Ref Range: 8.5 - 10.5 mg/dL 7.5 (L)  7.4 (L) 7.5 (L)   WBC Latest Ref Range: 4.8 - 10.8 thou/mm3 6.3  5.7    RBC Latest Ref Range: 4.20 - 5.40 mill/mm3 2.82 (L)  2.74 (L)    Hemoglobin Quant Latest Ref Range: 12.0 - 16.0 gm/dl 8.7 (L)  8.5 (L) 9.9 (L)   Hematocrit Latest Ref Range: 37.0 - 47.0 % 26.2 (L)  25.7 (L) 30.0 (L)   MCV Latest Ref Range: 81.0 - 99.0 fL 93.1  93.6    MCH Latest Ref Range: 27.0 - 31.0 pg 30.8  30.8    MCHC Latest Ref Range: 33.0 - 37.0 gm/dl 33.1  33.0    MPV Latest Ref Range: 7.4 - 10.4 mcm 8.5  8.6    RDW Latest Ref Range: 11.5 - 14.5 % 15.0 (H)  14.7 (H)    Platelet Count Latest Ref Range: 130 - 400 thou/mm3 170  172    FL UGI Unknown  Rpt         Electronically signed by Gayla Payton MD on 12/3/2017 at 12:34 PM

## 2017-12-03 NOTE — PLAN OF CARE
Problem: Falls - Risk of  Goal: Absence of falls  Outcome: Ongoing  Patient up with 1 assist, gait fair. Patient encouraged to ambulate 4 times a day. Patient walked 200 feet . Patient up in chair for 1 hour & helped with daily care. Problem: Pain:  Goal: Pain level will decrease  Pain level will decrease   Outcome: Ongoing  Patient taking iv morphine for incisional pain of 8/10 with pain goal of 4/10 met. Patient will not try oral pain medication due to nausea. Goal: Control of acute pain  Control of acute pain   Outcome: Ongoing      Problem: Cardiovascular  Goal: No DVT, peripheral vascular complications  Outcome: Ongoing  No s/s of DVT, no edema, or redness or pain in BLE. Problem: GI  Goal: No bowel complications  Outcome: Ongoing  Patient has slightly distended abdomen with hypoactive bowel sounds, no flatus. Patient has slight nausea. No emesis & taking small amounts of clear soda & water. Problem:   Goal: Adequate urinary output  Outcome: Ongoing  Patient has voided clear yellow urine in adequate amounts of 30 ml >per hour. Goal: No urinary complication  Outcome: Completed Date Met: 12/03/17      Problem: Nutrition  Goal: Optimal nutrition therapy  Outcome: Ongoing  Patient tolerating small sips of clear liquids. Problem: Skin Integrity/Risk  Goal: Wound healing  Outcome: Ongoing  Patient has an abdominal midline incision with staples intact, no drainage or edema or redness noted. KRYSTINA draining large amounts of serosanguinous fluid. Problem: Discharge Planning:  Goal: Discharged to appropriate level of care  Discharged to appropriate level of care   Outcome: Completed Date Met: 12/03/17      Comments: Care plan reviewed with patient and fmily. Patient and family verbalize understanding of the plan of care and contribute to goal setting.

## 2017-12-03 NOTE — FLOWSHEET NOTE
12/02/17 2240   Provider Notification   Reason for Communication New orders   Provider Name Herminio Kawasaki   Provider Notification Physician   Method of Communication Call   Response See orders     Patient continues to be nauseate after receiving zofran at 1915 with no relief. Order received for phenergen 12.5mg IM q6hr prn.

## 2017-12-03 NOTE — PROGRESS NOTES
Daughter Rosy Nicole at bedside. Updated on nausea through the night. Rosy Nicole asked if there were any other options beside NG tube. Patient states she really doesn't want NG. Discussed that it may be necessary if nausea does not resolve. Patient currently due for another dose of phenergen. Will give phenergen now and if still nauseated, will insert NG tube.

## 2017-12-04 LAB — POTASSIUM SERPL-SCNC: 4.1 MEQ/L (ref 3.5–5.2)

## 2017-12-04 PROCEDURE — G8987 SELF CARE CURRENT STATUS: HCPCS

## 2017-12-04 PROCEDURE — 97530 THERAPEUTIC ACTIVITIES: CPT

## 2017-12-04 PROCEDURE — 6360000002 HC RX W HCPCS: Performed by: SURGERY

## 2017-12-04 PROCEDURE — G8979 MOBILITY GOAL STATUS: HCPCS

## 2017-12-04 PROCEDURE — G8978 MOBILITY CURRENT STATUS: HCPCS

## 2017-12-04 PROCEDURE — 2580000003 HC RX 258: Performed by: SURGERY

## 2017-12-04 PROCEDURE — 36415 COLL VENOUS BLD VENIPUNCTURE: CPT

## 2017-12-04 PROCEDURE — 6370000000 HC RX 637 (ALT 250 FOR IP): Performed by: FAMILY MEDICINE

## 2017-12-04 PROCEDURE — 97166 OT EVAL MOD COMPLEX 45 MIN: CPT

## 2017-12-04 PROCEDURE — 99024 POSTOP FOLLOW-UP VISIT: CPT | Performed by: NURSE PRACTITIONER

## 2017-12-04 PROCEDURE — 6370000000 HC RX 637 (ALT 250 FOR IP): Performed by: SURGERY

## 2017-12-04 PROCEDURE — C9113 INJ PANTOPRAZOLE SODIUM, VIA: HCPCS | Performed by: FAMILY MEDICINE

## 2017-12-04 PROCEDURE — 97535 SELF CARE MNGMENT TRAINING: CPT

## 2017-12-04 PROCEDURE — 6360000002 HC RX W HCPCS: Performed by: FAMILY MEDICINE

## 2017-12-04 PROCEDURE — 1200000000 HC SEMI PRIVATE

## 2017-12-04 PROCEDURE — 6370000000 HC RX 637 (ALT 250 FOR IP): Performed by: NURSE PRACTITIONER

## 2017-12-04 PROCEDURE — 6370000000 HC RX 637 (ALT 250 FOR IP)

## 2017-12-04 PROCEDURE — 97162 PT EVAL MOD COMPLEX 30 MIN: CPT

## 2017-12-04 PROCEDURE — G8988 SELF CARE GOAL STATUS: HCPCS

## 2017-12-04 PROCEDURE — 84132 ASSAY OF SERUM POTASSIUM: CPT

## 2017-12-04 RX ORDER — DOCUSATE SODIUM 100 MG/1
100 CAPSULE, LIQUID FILLED ORAL DAILY
Status: DISCONTINUED | OUTPATIENT
Start: 2017-12-04 | End: 2017-12-07 | Stop reason: HOSPADM

## 2017-12-04 RX ORDER — PANTOPRAZOLE SODIUM 40 MG/1
40 TABLET, DELAYED RELEASE ORAL
Status: DISCONTINUED | OUTPATIENT
Start: 2017-12-05 | End: 2017-12-07 | Stop reason: HOSPADM

## 2017-12-04 RX ORDER — CALCIUM CARBONATE 200(500)MG
500 TABLET,CHEWABLE ORAL 3 TIMES DAILY PRN
Status: DISCONTINUED | OUTPATIENT
Start: 2017-12-04 | End: 2017-12-07 | Stop reason: HOSPADM

## 2017-12-04 RX ORDER — MAGNESIUM HYDROXIDE/ALUMINUM HYDROXICE/SIMETHICONE 120; 1200; 1200 MG/30ML; MG/30ML; MG/30ML
30 SUSPENSION ORAL EVERY 6 HOURS PRN
Status: DISCONTINUED | OUTPATIENT
Start: 2017-12-04 | End: 2017-12-07 | Stop reason: HOSPADM

## 2017-12-04 RX ADMIN — ANTACID TABLETS 500 MG: 500 TABLET, CHEWABLE ORAL at 23:25

## 2017-12-04 RX ADMIN — METOPROLOL TARTRATE 12.5 MG: 25 TABLET ORAL at 21:11

## 2017-12-04 RX ADMIN — MORPHINE SULFATE 2 MG: 2 INJECTION, SOLUTION INTRAMUSCULAR; INTRAVENOUS at 05:39

## 2017-12-04 RX ADMIN — LORAZEPAM 0.5 MG: 0.5 TABLET ORAL at 23:26

## 2017-12-04 RX ADMIN — METOPROLOL TARTRATE 12.5 MG: 25 TABLET ORAL at 08:15

## 2017-12-04 RX ADMIN — DOCUSATE SODIUM 100 MG: 100 CAPSULE ORAL at 17:06

## 2017-12-04 RX ADMIN — SODIUM CHLORIDE: 9 INJECTION, SOLUTION INTRAVENOUS at 05:40

## 2017-12-04 RX ADMIN — METOCLOPRAMIDE 10 MG: 5 INJECTION, SOLUTION INTRAMUSCULAR; INTRAVENOUS at 08:16

## 2017-12-04 RX ADMIN — METOCLOPRAMIDE 10 MG: 5 INJECTION, SOLUTION INTRAMUSCULAR; INTRAVENOUS at 17:05

## 2017-12-04 RX ADMIN — PANTOPRAZOLE SODIUM 40 MG: 40 INJECTION, POWDER, FOR SOLUTION INTRAVENOUS at 08:16

## 2017-12-04 RX ADMIN — ENOXAPARIN SODIUM 40 MG: 40 INJECTION SUBCUTANEOUS at 13:11

## 2017-12-04 RX ADMIN — METOCLOPRAMIDE 10 MG: 5 INJECTION, SOLUTION INTRAMUSCULAR; INTRAVENOUS at 00:00

## 2017-12-04 RX ADMIN — Medication 1 CAPSULE: at 08:15

## 2017-12-04 RX ADMIN — SODIUM CHLORIDE: 9 INJECTION, SOLUTION INTRAVENOUS at 19:15

## 2017-12-04 RX ADMIN — HYDROCODONE BITARTRATE AND ACETAMINOPHEN 1 TABLET: 5; 325 TABLET ORAL at 09:15

## 2017-12-04 RX ADMIN — Medication 1 CAPSULE: at 21:10

## 2017-12-04 RX ADMIN — Medication 10 ML: at 08:16

## 2017-12-04 ASSESSMENT — PAIN DESCRIPTION - FREQUENCY: FREQUENCY: CONTINUOUS

## 2017-12-04 ASSESSMENT — PAIN DESCRIPTION - LOCATION
LOCATION: ABDOMEN

## 2017-12-04 ASSESSMENT — PAIN SCALES - GENERAL
PAINLEVEL_OUTOF10: 0
PAINLEVEL_OUTOF10: 8
PAINLEVEL_OUTOF10: 6
PAINLEVEL_OUTOF10: 6
PAINLEVEL_OUTOF10: 5
PAINLEVEL_OUTOF10: 7
PAINLEVEL_OUTOF10: 0
PAINLEVEL_OUTOF10: 0
PAINLEVEL_OUTOF10: 4

## 2017-12-04 ASSESSMENT — PAIN DESCRIPTION - PAIN TYPE
TYPE: SURGICAL PAIN

## 2017-12-04 ASSESSMENT — PAIN DESCRIPTION - ORIENTATION: ORIENTATION: MID

## 2017-12-04 ASSESSMENT — PAIN DESCRIPTION - DESCRIPTORS: DESCRIPTORS: SORE

## 2017-12-04 ASSESSMENT — PAIN DESCRIPTION - PROGRESSION: CLINICAL_PROGRESSION: NOT CHANGED

## 2017-12-04 NOTE — PROGRESS NOTES
willing to go for a walk. She remained in the chair after the session. She had requested a pain med at the start of session. She reported the pain had not decreased during the session. General:  Overall Orientation Status: Within Normal Limits    Vision: Impaired    Hearing: Within functional limits         Pain:  Pain Assessment  Patient Currently in Pain: Yes  Pain Assessment: 0-10  Pain Level: 7  Pain Type: Surgical pain  Pain Location: Abdomen  Pain Orientation: Mid  Pain Descriptors: Sore  Pain Frequency: Continuous  Clinical Progression: Not changed  Patient's Stated Pain Goal: 3  Pain Intervention(s): Declines  Response to Pain Intervention: Patient Satisfied  Multiple Pain Sites: No       Social/Functional:  Lives With: Alone  Type of Home: Apartment (Senior Apartment )  Home Layout: One level  Home Access: Level entry  Home Equipment: Rolling walker, Cane, [employer] cane     Bathroom Shower/Tub: Walk-in shower  Bathroom Toilet: Handicap height  Bathroom Equipment: Grab bars in shower  Bathroom Accessibility: Accessible  IADL Comments: Pt has a  who is able to assist 2x/month. She has delivered meals 3x/wk. Pt does her own morning cooking and warms up the meals for the weekends. Receives Help From: Family  ADL Assistance: Independent  Homemaking Assistance: Needs assistance  Meal Prep: Minimal  Laundry: Stand by  Vacuuming: Moderate  Cleaning: Minimal  Gardening: Maximal  Yard Work: Maximal  Driving: Maximal  Shopping: Minimal  Homemaking Responsibilities: Yes  Meal Prep Responsibility: Secondary  Laundry Responsibility: Primary  Cleaning Responsibility: Secondary  Shopping Responsibility: Primary  Health Care Management: Primary    Ambulation Assistance: Independent  Transfer Assistance: Independent    Active : No  Occupation: Retired  Leisure & Hobbies: Writing, reading the newspaper, going for walks  Additional Comments: Pt uses her cane or the rolling walker when going places. Pt had been having OP PT  for several weeks for R hip pain.     Objective  Vision - Basic Assessment  Prior Vision: Wears glasses only for reading     Overall Cognitive Status: Exceptions  Safety Judgement: Decreased awareness of need for safety  Problem Solving: Assistance required to generate solutions, Assistance required to identify errors made         Sensation  Overall Sensation Status: Impaired (Occasional tingling in her feet)  Light Touch: Partial deficits in the LLE, Partial deficits in the RLE  Sharp/Dull: Partial deficits in the RLE, Partial deficits in the LLE    Posture: Fair (Kyphosis noted with forward flexed while walking more than while standing)  Observation: Pt has skin breakdown along her mid spine and has a soft dressing over spinous processes    Observation/Palpation  Posture: Fair (Kyphosis noted with forward flexed while walking more than while standing)  Observation: Pt has skin breakdown along her mid spine and has a soft dressing over spinous processes    Hand Dominance: Right    LUE PROM (degrees)  LUE PROM: WNL       LUE AROM (degrees)  LUE AROM : WNL  Left Hand PROM (degrees)  Left Hand PROM: WNL  Left Hand AROM (degrees)  Left Hand AROM: WNL    RUE PROM (degrees)  RUE PROM: WNL  RUE AROM (degrees)  RUE AROM : WNL  Right Hand PROM (degrees)  Right Hand PROM: WNL  Right Hand AROM (degrees)  Right Hand AROM: WNL    LUE Strength  L Hand Grasp: 4/5  LUE Strength Comment: 3+/5 deltoid; 3+/5 pectoral; 4-/5 biceps/triceps                RUE Strength  R Hand Grasp: 4/5  RUE Strength Comment: Deltoid NT secondary to bursitis; 3+/5 pectoral; 4-/5 biceps/triceps                             Movements Are Fluid And Coordinated: Yes     Fine Motor Skills  Fine Motor Comment: No difficulty noted with using her hands for holding the newspaper or her drinking cup         ADL  LE Dressing: Maximum assistance (Help was provided for donning her slipper socks and to start her pajama bottoms)     Bed

## 2017-12-04 NOTE — PROGRESS NOTES
Fall Risk, General Precautions        Position Activity Restriction  Other position/activity restrictions: abdominal incision       Subjective:  Chart Reviewed: Yes  Patient assessed for rehabilitation services?: Yes  Comments: Pt is s/p Suellen Hams patch repair of gastric perforation by Dr. Kyrie Rankin on 11/29/17  Subjective: Pt resting in bed and agrees to therapy. General:  Overall Orientation Status: Within Normal Limits    Vision: Impaired  Vision Exceptions: Wears glasses for reading    Hearing: Within functional limits       Pain:  Denies (Pt reports having gas discomfort vs pain. ). Social/Functional History:    Lives With: Alone  Type of Home: Apartment (Senior Apartment )  Home Layout: One level  Home Access: Level entry  Home Equipment: Rolling walker, Cane, [employer] cane     Bathroom Shower/Tub: Walk-in shower  Bathroom Toilet: Handicap height  Bathroom Equipment: Grab bars in shower  Bathroom Accessibility: Accessible  IADL Comments: Pt has a  who is able to assist 2x/month. She has delivered meals 3x/wk. Pt does her own morning cooking and warms up the meals for the weekends. Receives Help From: Family  ADL Assistance: Independent  Homemaking Assistance: Needs assistance  Meal Prep: Minimal  Laundry: Stand by  Vacuuming: Moderate  Cleaning: Minimal  Gardening: Maximal  Yard Work: Maximal  Driving: Maximal  Shopping: Minimal  Homemaking Responsibilities: Yes  Meal Prep Responsibility: Secondary  Laundry Responsibility: Primary  Cleaning Responsibility: Secondary  Shopping Responsibility: Primary  Health Care Management: Primary  Ambulation Assistance: Independent  Transfer Assistance: Independent    Active : No  Occupation: Retired  Leisure & Hobbies: Writing, reading the newspaper, going for walks  Additional Comments: Pt uses her cane or the rolling walker when going places. Pt had been having OP PT  for several weeks for R hip pain.     Objective:     RLE AROM: WFL     LLE AROM Inpatient without Stair CMS G-Code Modifier : CELINA Chadwick Mates.  Francisco Rivas [place] [address]

## 2017-12-04 NOTE — PROGRESS NOTES
Urine  (mL/kg/hr) 400  (1) 275  (0.7)  675    Drains 90 380 50 520    Shift Total  (mL/kg) 490  (9.4) 655  (12.6) 50  (1) 1195  (22.9)   Weight (kg) 52.2 52.2 52.2 52.2     LABS     Recent Labs      12/02/17   0533  12/03/17   0605  12/04/17   0544   WBC  5.7   --    --    HGB  8.5*  9.9*   --    HCT  25.7*  30.0*   --    PLT  172   --    --    NA  146*  142   --    K  3.5  3.3*  4.1   CL  110  105   --    CO2  20*  22*   --    BUN  19  19   --    CREATININE  0.6  0.7   --    CALCIUM  7.4*  7.5*   --       No results for input(s): PTT, INR in the last 72 hours. Invalid input(s): PT  No results for input(s): AST, ALT, BILITOT, BILIDIR, AMYLASE, LIPASE, LDH, LACTA in the last 72 hours. No results for input(s): TROPONINT in the last 72 hours. RADIOLOGY     FL UGI [[id number]] Resulted: 12/01/17 1146   Updated: 12/01/17 1148    Narrative:     PROCEDURE: FL UGI    CLINICAL INFORMATION: S/p gastric ulcer repair, r/o leak,  .    COMPARISON: No prior study. TECHNIQUE: The study was done with the dilute Gastrografin and thin barium. Initially the patient was administered dilute Gastrografin orally using a straw subsequently additional dilute Gastrografin was injected through the NG tube. Patient was also   administered thin barium through the NG tube. NG tube is flushed after use. Overhead images were done. A delayed upright abdomen was done. FINDINGS: Marked decreased peristalsis of the stomach. Extensive spontaneous gastroesophageal reflux. Eventual passage of barium and Gastrografin to the small bowel. No evidence of leak. On delayed image there is a still of contrast in the gastric cardia   and fundus. Free air seen below the hemidiaphragm consistent with recent surgery. Impression:     No evidence of the postsurgical gastric leak. Severe spontaneous gastroesophageal reflux.      Electronically signed by Rickie Menendez CNP on 12/4/2017 at 5:56 PM     Patient seen and examined independently by me earlier today on rounds. Above discussed and I agree with Richelle Guadalupe CNP. See my additional comments below for updated orders and plan. Labs, cultures, and radiographs where available were reviewed. I discussed patient concerns with the patient's nurse and instructions were given. Please see our orders for the updated patient care plan. - Trifold liquids today. KRYSTINA drain serosanguineous. Pain control. Slow bowel function returned. Starting to improve with mobility. Voiding spontaneously. Stool softeners. DVT prophylaxis. PT/OT. Repeat labs in a.m. Patient states she clinically feels better today compared to yesterday. Continuing supportive care.     Electronically signed by Emily Massey MD on 12/4/2017 at 6:15 PM

## 2017-12-05 LAB
ANION GAP SERPL CALCULATED.3IONS-SCNC: 12 MEQ/L (ref 8–16)
BUN BLDV-MCNC: 13 MG/DL (ref 7–22)
CALCIUM IONIZED: 1.08 MMOL/L (ref 1.12–1.32)
CALCIUM SERPL-MCNC: 7 MG/DL (ref 8.5–10.5)
CHLORIDE BLD-SCNC: 107 MEQ/L (ref 98–111)
CO2: 20 MEQ/L (ref 23–33)
CREAT SERPL-MCNC: 0.7 MG/DL (ref 0.4–1.2)
GFR SERPL CREATININE-BSD FRML MDRD: 79 ML/MIN/1.73M2
GLUCOSE BLD-MCNC: 110 MG/DL (ref 70–108)
HCT VFR BLD CALC: 26.6 % (ref 37–47)
HEMOGLOBIN: 9 GM/DL (ref 12–16)
IRON: 33 UG/DL (ref 50–170)
POTASSIUM SERPL-SCNC: 3.2 MEQ/L (ref 3.5–5.2)
SODIUM BLD-SCNC: 139 MEQ/L (ref 135–145)

## 2017-12-05 PROCEDURE — 6370000000 HC RX 637 (ALT 250 FOR IP): Performed by: FAMILY MEDICINE

## 2017-12-05 PROCEDURE — 6360000002 HC RX W HCPCS: Performed by: SURGERY

## 2017-12-05 PROCEDURE — 85018 HEMOGLOBIN: CPT

## 2017-12-05 PROCEDURE — 97116 GAIT TRAINING THERAPY: CPT

## 2017-12-05 PROCEDURE — 97535 SELF CARE MNGMENT TRAINING: CPT

## 2017-12-05 PROCEDURE — 80048 BASIC METABOLIC PNL TOTAL CA: CPT

## 2017-12-05 PROCEDURE — 1200000000 HC SEMI PRIVATE

## 2017-12-05 PROCEDURE — 83540 ASSAY OF IRON: CPT

## 2017-12-05 PROCEDURE — 2580000003 HC RX 258: Performed by: SURGERY

## 2017-12-05 PROCEDURE — 99024 POSTOP FOLLOW-UP VISIT: CPT | Performed by: SURGERY

## 2017-12-05 PROCEDURE — 6370000000 HC RX 637 (ALT 250 FOR IP): Performed by: SURGERY

## 2017-12-05 PROCEDURE — 36415 COLL VENOUS BLD VENIPUNCTURE: CPT

## 2017-12-05 PROCEDURE — 82330 ASSAY OF CALCIUM: CPT

## 2017-12-05 PROCEDURE — 6370000000 HC RX 637 (ALT 250 FOR IP): Performed by: NURSE PRACTITIONER

## 2017-12-05 PROCEDURE — 97110 THERAPEUTIC EXERCISES: CPT

## 2017-12-05 PROCEDURE — 85014 HEMATOCRIT: CPT

## 2017-12-05 PROCEDURE — 97530 THERAPEUTIC ACTIVITIES: CPT

## 2017-12-05 RX ORDER — POTASSIUM CHLORIDE 20 MEQ/1
40 TABLET, EXTENDED RELEASE ORAL ONCE
Status: COMPLETED | OUTPATIENT
Start: 2017-12-05 | End: 2017-12-05

## 2017-12-05 RX ADMIN — METOCLOPRAMIDE 10 MG: 5 INJECTION, SOLUTION INTRAMUSCULAR; INTRAVENOUS at 01:00

## 2017-12-05 RX ADMIN — DOCUSATE SODIUM 100 MG: 100 CAPSULE ORAL at 10:49

## 2017-12-05 RX ADMIN — SODIUM CHLORIDE: 9 INJECTION, SOLUTION INTRAVENOUS at 08:51

## 2017-12-05 RX ADMIN — ENOXAPARIN SODIUM 40 MG: 40 INJECTION SUBCUTANEOUS at 15:18

## 2017-12-05 RX ADMIN — METOCLOPRAMIDE 10 MG: 5 INJECTION, SOLUTION INTRAMUSCULAR; INTRAVENOUS at 08:51

## 2017-12-05 RX ADMIN — HYDROCODONE BITARTRATE AND ACETAMINOPHEN 1 TABLET: 5; 325 TABLET ORAL at 10:56

## 2017-12-05 RX ADMIN — HYDROCODONE BITARTRATE AND ACETAMINOPHEN 1 TABLET: 5; 325 TABLET ORAL at 05:00

## 2017-12-05 RX ADMIN — METOPROLOL TARTRATE 12.5 MG: 25 TABLET ORAL at 10:48

## 2017-12-05 RX ADMIN — HYDROCODONE BITARTRATE AND ACETAMINOPHEN 1 TABLET: 5; 325 TABLET ORAL at 17:29

## 2017-12-05 RX ADMIN — PANTOPRAZOLE SODIUM 40 MG: 40 TABLET, DELAYED RELEASE ORAL at 05:47

## 2017-12-05 RX ADMIN — LORAZEPAM 0.5 MG: 0.5 TABLET ORAL at 21:54

## 2017-12-05 RX ADMIN — SODIUM CHLORIDE: 9 INJECTION, SOLUTION INTRAVENOUS at 21:48

## 2017-12-05 RX ADMIN — METOCLOPRAMIDE 10 MG: 5 INJECTION, SOLUTION INTRAMUSCULAR; INTRAVENOUS at 15:19

## 2017-12-05 RX ADMIN — Medication 1 CAPSULE: at 21:48

## 2017-12-05 RX ADMIN — POTASSIUM CHLORIDE 40 MEQ: 1500 TABLET, EXTENDED RELEASE ORAL at 10:50

## 2017-12-05 RX ADMIN — Medication 1 CAPSULE: at 10:49

## 2017-12-05 ASSESSMENT — PAIN DESCRIPTION - LOCATION
LOCATION: ABDOMEN

## 2017-12-05 ASSESSMENT — PAIN SCALES - GENERAL
PAINLEVEL_OUTOF10: 3
PAINLEVEL_OUTOF10: 8
PAINLEVEL_OUTOF10: 8
PAINLEVEL_OUTOF10: 5
PAINLEVEL_OUTOF10: 5
PAINLEVEL_OUTOF10: 6
PAINLEVEL_OUTOF10: 7
PAINLEVEL_OUTOF10: 6
PAINLEVEL_OUTOF10: 5
PAINLEVEL_OUTOF10: 7
PAINLEVEL_OUTOF10: 6
PAINLEVEL_OUTOF10: 6

## 2017-12-05 ASSESSMENT — PAIN DESCRIPTION - PAIN TYPE
TYPE: SURGICAL PAIN
TYPE: ACUTE PAIN
TYPE: SURGICAL PAIN
TYPE: SURGICAL PAIN

## 2017-12-05 ASSESSMENT — PAIN DESCRIPTION - PROGRESSION
CLINICAL_PROGRESSION: NOT CHANGED

## 2017-12-05 ASSESSMENT — PAIN DESCRIPTION - ONSET: ONSET: ON-GOING

## 2017-12-05 ASSESSMENT — PAIN DESCRIPTION - DESCRIPTORS: DESCRIPTORS: SORE

## 2017-12-05 ASSESSMENT — PAIN DESCRIPTION - FREQUENCY: FREQUENCY: CONTINUOUS

## 2017-12-05 ASSESSMENT — PAIN DESCRIPTION - ORIENTATION
ORIENTATION: LOWER
ORIENTATION: MID

## 2017-12-05 NOTE — PROGRESS NOTES
Recommendations: Continue to assess pending progress    Patient Education:  Patient Education: therex, bed mobility, transfers, gait    Equipment Recommendations:  Equipment Needed: No    Safety:  Type of devices:  All fall risk precautions in place, Bed alarm in place, Call light within reach, Gait belt, Patient at risk for falls, Left in bed, Nurse notified    Plan:  Times per week: 5x GM  Times per day: Daily  Current Treatment Recommendations: Strengthening, Balance Training, Functional Mobility Training, Endurance Training, Gait Training, Home Exercise Program, Safety Education & Training, Patient/Caregiver Education & Training    Goals:  Patient goals : go home    Short term goals  Time Frame for Short term goals: 4 days  Short term goal 1: Pt to be Mod I for supine <> sit to get in/out of bed  Short term goal 2: Pt to be Mod I for sit <> stand to get up to ambualte  Short term goal 3: Pt to ambulate > 150 ft with RW with Supervision for household distances    Long term goals  Time Frame for Long term goals : not set due to short ELOS            AM-PAC Inpatient Mobility without Stair Climbing Raw Score : 16  AM-PAC Inpatient without Stair Climbing T-Scale Score : 45.54  Mobility Inpatient CMS 0-100% Score: 40.64  Mobility Inpatient without Stair CMS G-Code Modifier : CK

## 2017-12-05 NOTE — PLAN OF CARE
Problem: Falls - Risk of  Goal: Absence of falls  Outcome: Ongoing  Patient free from falls this shift, up with assistance, skid proof footwear on and walker use while up    Problem: Pain:  Goal: Pain level will decrease  Pain level will decrease   Outcome: Ongoing  Pain medication availability and side effect  Goal: Control of acute pain  Control of acute pain   Outcome: Ongoing  Patient states that her pain is reasonable, declines medication presently    Problem: Cardiovascular  Goal: No DVT, peripheral vascular complications  Outcome: Ongoing  Patient has no signs or symptoms of dvt, refuses scd's, taking lovenox, education given    Problem: GI  Goal: No bowel complications  Outcome: Ongoing  Patient has hyperactive bowel sounds, passing small amounts of gas, abdomen is soft distended    Problem:   Goal: Adequate urinary output  Outcome: Ongoing  Patient has adequate urinary output    Problem: Nutrition  Goal: Optimal nutrition therapy  Outcome: Ongoing  Patient is taking in some fluids in small amounts    Problem: Skin Integrity/Risk  Goal: Wound healing  Outcome: Ongoing  Patient has abdominal incision that has staples, well approximated, no signs or symptoms of infection    Comments: Care plan reviewed with patient. Patient verbalize understanding of the plan of care and contribute to goal setting.

## 2017-12-05 NOTE — PROGRESS NOTES
Intravenous 2 times per day Rene Afua Wisser, DO   10 mL at 12/04/17 0816    sodium chloride flush 0.9 % injection 10 mL  10 mL Intravenous PRN Rene Afua Wisser, DO        acetaminophen (TYLENOL) tablet 650 mg  650 mg Oral Q4H PRN Rene Afua Wisser, DO        ondansetron [facility]) injection 4 mg  4 mg Intravenous Q6H PRN Rene Afua Wisser, DO   4 mg at 12/03/17 1253    enoxaparin (LOVENOX) injection 40 mg  40 mg Subcutaneous Q24H Rene Afua Wisser, DO   40 mg at 12/04/17 1311    morphine injection 2 mg  2 mg Intravenous Q2H PRN Rene Afua Wisser, DO   2 mg at 12/04/17 7940    Or    morphine (PF) injection 4 mg  4 mg Intravenous Q2H PRN Rene Afua Lemosser, DO   4 mg at 12/02/17 1951    morphine injection 2 mg  2 mg Intravenous Q10 Min PRN Vara Mohnton, DO   2 mg at 11/29/17 1928     Allergies   Allergen Reactions    Amoxicillin-Pot Clavulanate Diarrhea    Statins Support Therapy      Unaware of allery    Sulfa Antibiotics Nausea Only     Active Problems:    Anxiety    Depression    GERD (gastroesophageal reflux disease)    Pneumoperitoneum    Acute blood loss as cause of postoperative anemia    Hypokalemia    Hypertension    Gastric perforation (HCC)    Blood pressure (!) 158/71, pulse 85, temperature 97.7 °F (36.5 °C), temperature source Oral, resp. rate 16, height 5' 3\" (1.6 m), weight 115 lb (52.2 kg), SpO2 97 %, not currently breastfeeding. Subjective:  Symptoms:  Stable. Diet:  Adequate intake. Activity level: Impaired due to weakness. Pain:  She complains of pain that is mild. Objective:  General Appearance:  Comfortable. Vital signs: (most recent): Blood pressure (!) 158/71, pulse 85, temperature 97.7 °F (36.5 °C), temperature source Oral, resp. rate 16, height 5' 3\" (1.6 m), weight 115 lb (52.2 kg), SpO2 97 %, not currently breastfeeding. Vital signs are normal.    Output: Producing urine. HEENT: Normal HEENT exam.    Lungs:  Normal effort and normal respiratory rate.   Breath sounds

## 2017-12-05 NOTE — PROGRESS NOTES
[facility]  Postoperative Progress Note    Pt Name: Nalini Arellano  Medical Record Number: [id number]  Date of Birth 11/12/1931   Today's Date: 12/5/2017  ASSESSMENT   1. POD # 6 repair of perforated gastric ulcer  2. UGI 12/1 - no leak identified - Severe spontaneous gastroesophageal reflux   has a past medical history of Alcohol abuse; Anxiety; Colon polyp; Depression; GERD (gastroesophageal reflux disease); Hyperlipidemia; Hypertension; Hypothyroid; Neuropathy, peripheral ([address] Osteoarthritis; Osteoporosis; Thyroid disease; and Vitamin D deficiency. PLAN   1. Analgesics and antiemetics as needed  2. IV fluids  3. AHA diet as tolerated  4. Voiding spontaneously   5. Remove KRYSTINA  6. Antacid therapy  7. Increase activity  8. Colace daily  9. Lovenox for DVT prophylaxis  10. PT OT for deconditioning increase activity  11. Repeat labs in am   12. Patient clinically feeling better than yesterday. Await bowel function. Del Fitzgerald is doing better. Looks good. Nausea has resolved. She denies any vomiting. She is passing flatus now. No bowel movement yet though. She is tolerating a full liquid diet. Her pain is well controlled on current medications. She is moving better. Midline incision intact with staples.    CURRENT MEDICATIONS   Scheduled Meds:   potassium chloride  40 mEq Oral Once    pantoprazole  40 mg Oral QAM AC    docusate sodium  100 mg Oral Daily    potassium replacement protocol   Other RX Placeholder    metoprolol tartrate  12.5 mg Oral BID    metoclopramide  10 mg Intravenous Q8H    PRESERVISION AREDS 2  1 capsule Oral BID    sodium chloride flush  10 mL Intravenous 2 times per day    sodium chloride flush  10 mL Intravenous 2 times per day    enoxaparin  40 mg Subcutaneous Q24H     Continuous Infusions:   sodium chloride 75 mL/hr at 12/04/17 1915     PRN Meds:.aluminum & magnesium hydroxide-simethicone, calcium carbonate, LORazepam, (kg) 52.2 52.2 52.2 52.2     LABS     Recent Labs      12/03/17   0605  12/04/17   0544  12/05/17   0547   HGB  9.9*   --   9.0*   HCT  30.0*   --   26.6*   NA  142   --   139   K  3.3*  4.1  3.2*   CL  105   --   107   CO2  22*   --   20*   BUN  19   --   13   CREATININE  0.7   --   0.7   CALCIUM  7.5*   --   7.0*      No results for input(s): PTT, INR in the last 72 hours. Invalid input(s): PT  No results for input(s): AST, ALT, BILITOT, BILIDIR, AMYLASE, LIPASE, LDH, LACTA in the last 72 hours. No results for input(s): TROPONINT in the last 72 hours. RADIOLOGY     FL UGI [[id number]] Resulted: 12/01/17 1146   Updated: 12/01/17 1148    Narrative:     PROCEDURE: FL UGI    CLINICAL INFORMATION: S/p gastric ulcer repair, r/o leak,  .    COMPARISON: No prior study. TECHNIQUE: The study was done with the dilute Gastrografin and thin barium. Initially the patient was administered dilute Gastrografin orally using a straw subsequently additional dilute Gastrografin was injected through the NG tube. Patient was also   administered thin barium through the NG tube. NG tube is flushed after use. Overhead images were done. A delayed upright abdomen was done. FINDINGS: Marked decreased peristalsis of the stomach. Extensive spontaneous gastroesophageal reflux. Eventual passage of barium and Gastrografin to the small bowel. No evidence of leak. On delayed image there is a still of contrast in the gastric cardia   and fundus. Free air seen below the hemidiaphragm consistent with recent surgery. Impression:     No evidence of the postsurgical gastric leak. Severe spontaneous gastroesophageal reflux.        Electronically signed by Anil Morris DO on 12/5/2017 at 8:00 AM

## 2017-12-06 ENCOUNTER — APPOINTMENT (OUTPATIENT)
Dept: CT IMAGING | Age: 82
DRG: 327 | End: 2017-12-06
Payer: MEDICARE

## 2017-12-06 LAB
ALLEN TEST: ABNORMAL
ANION GAP SERPL CALCULATED.3IONS-SCNC: 15 MEQ/L (ref 8–16)
BASE EXCESS (CALCULATED): -2.9 MMOL/L (ref -2.5–2.5)
BUN BLDV-MCNC: 10 MG/DL (ref 7–22)
CALCIUM SERPL-MCNC: 7.1 MG/DL (ref 8.5–10.5)
CHLORIDE BLD-SCNC: 105 MEQ/L (ref 98–111)
CO2: 20 MEQ/L (ref 23–33)
COLLECTED BY:: ABNORMAL
CREAT SERPL-MCNC: 0.7 MG/DL (ref 0.4–1.2)
DEVICE: ABNORMAL
GFR SERPL CREATININE-BSD FRML MDRD: 79 ML/MIN/1.73M2
GLUCOSE BLD-MCNC: 99 MG/DL (ref 70–108)
HCO3: 20 MMOL/L (ref 23–28)
O2 SATURATION: 97 %
PCO2: 25 MMHG (ref 35–45)
PH BLOOD GAS: 7.51 (ref 7.35–7.45)
PO2: 79 MMHG (ref 71–104)
POTASSIUM SERPL-SCNC: 3.4 MEQ/L (ref 3.5–5.2)
SODIUM BLD-SCNC: 140 MEQ/L (ref 135–145)
SOURCE, BLOOD GAS: ABNORMAL

## 2017-12-06 PROCEDURE — 80048 BASIC METABOLIC PNL TOTAL CA: CPT

## 2017-12-06 PROCEDURE — 93005 ELECTROCARDIOGRAM TRACING: CPT

## 2017-12-06 PROCEDURE — 6370000000 HC RX 637 (ALT 250 FOR IP): Performed by: FAMILY MEDICINE

## 2017-12-06 PROCEDURE — 82803 BLOOD GASES ANY COMBINATION: CPT

## 2017-12-06 PROCEDURE — 97110 THERAPEUTIC EXERCISES: CPT

## 2017-12-06 PROCEDURE — 36600 WITHDRAWAL OF ARTERIAL BLOOD: CPT

## 2017-12-06 PROCEDURE — 97535 SELF CARE MNGMENT TRAINING: CPT

## 2017-12-06 PROCEDURE — 97116 GAIT TRAINING THERAPY: CPT

## 2017-12-06 PROCEDURE — B3201ZZ COMPUTERIZED TOMOGRAPHY (CT SCAN) OF THORACIC AORTA USING LOW OSMOLAR CONTRAST: ICD-10-PCS | Performed by: SURGERY

## 2017-12-06 PROCEDURE — 2580000003 HC RX 258: Performed by: SURGERY

## 2017-12-06 PROCEDURE — 6360000004 HC RX CONTRAST MEDICATION: Performed by: NURSE PRACTITIONER

## 2017-12-06 PROCEDURE — 6370000000 HC RX 637 (ALT 250 FOR IP): Performed by: SURGERY

## 2017-12-06 PROCEDURE — 71275 CT ANGIOGRAPHY CHEST: CPT

## 2017-12-06 PROCEDURE — B32T1ZZ COMPUTERIZED TOMOGRAPHY (CT SCAN) OF LEFT PULMONARY ARTERY USING LOW OSMOLAR CONTRAST: ICD-10-PCS | Performed by: SURGERY

## 2017-12-06 PROCEDURE — 97530 THERAPEUTIC ACTIVITIES: CPT

## 2017-12-06 PROCEDURE — A6212 FOAM DRG <=16 SQ IN W/BORDER: HCPCS

## 2017-12-06 PROCEDURE — 6360000002 HC RX W HCPCS: Performed by: SURGERY

## 2017-12-06 PROCEDURE — 1200000000 HC SEMI PRIVATE

## 2017-12-06 PROCEDURE — 6370000000 HC RX 637 (ALT 250 FOR IP): Performed by: NURSE PRACTITIONER

## 2017-12-06 PROCEDURE — 99024 POSTOP FOLLOW-UP VISIT: CPT | Performed by: SURGERY

## 2017-12-06 PROCEDURE — 36415 COLL VENOUS BLD VENIPUNCTURE: CPT

## 2017-12-06 PROCEDURE — B32S1ZZ COMPUTERIZED TOMOGRAPHY (CT SCAN) OF RIGHT PULMONARY ARTERY USING LOW OSMOLAR CONTRAST: ICD-10-PCS | Performed by: SURGERY

## 2017-12-06 RX ORDER — POTASSIUM CHLORIDE 750 MG/1
40 TABLET, FILM COATED, EXTENDED RELEASE ORAL ONCE
Status: COMPLETED | OUTPATIENT
Start: 2017-12-06 | End: 2017-12-06

## 2017-12-06 RX ADMIN — METOCLOPRAMIDE 10 MG: 5 INJECTION, SOLUTION INTRAMUSCULAR; INTRAVENOUS at 00:15

## 2017-12-06 RX ADMIN — METOPROLOL TARTRATE 12.5 MG: 25 TABLET ORAL at 20:05

## 2017-12-06 RX ADMIN — HYDROCODONE BITARTRATE AND ACETAMINOPHEN 1 TABLET: 5; 325 TABLET ORAL at 23:24

## 2017-12-06 RX ADMIN — METOPROLOL TARTRATE 12.5 MG: 25 TABLET ORAL at 08:52

## 2017-12-06 RX ADMIN — ANTACID TABLETS 500 MG: 500 TABLET, CHEWABLE ORAL at 20:05

## 2017-12-06 RX ADMIN — ENOXAPARIN SODIUM 40 MG: 40 INJECTION SUBCUTANEOUS at 13:38

## 2017-12-06 RX ADMIN — IOPAMIDOL 80 ML: 755 INJECTION, SOLUTION INTRAVENOUS at 22:56

## 2017-12-06 RX ADMIN — Medication 1 CAPSULE: at 20:05

## 2017-12-06 RX ADMIN — PANTOPRAZOLE SODIUM 40 MG: 40 TABLET, DELAYED RELEASE ORAL at 04:55

## 2017-12-06 RX ADMIN — Medication 10 ML: at 20:05

## 2017-12-06 RX ADMIN — LORAZEPAM 0.5 MG: 0.5 TABLET ORAL at 23:24

## 2017-12-06 RX ADMIN — METOCLOPRAMIDE 10 MG: 5 INJECTION, SOLUTION INTRAMUSCULAR; INTRAVENOUS at 08:52

## 2017-12-06 RX ADMIN — POTASSIUM CHLORIDE 40 MEQ: 750 TABLET, FILM COATED, EXTENDED RELEASE ORAL at 08:52

## 2017-12-06 RX ADMIN — HYDROCODONE BITARTRATE AND ACETAMINOPHEN 1 TABLET: 5; 325 TABLET ORAL at 04:55

## 2017-12-06 RX ADMIN — DOCUSATE SODIUM 100 MG: 100 CAPSULE ORAL at 08:52

## 2017-12-06 ASSESSMENT — PAIN SCALES - GENERAL
PAINLEVEL_OUTOF10: 4
PAINLEVEL_OUTOF10: 5
PAINLEVEL_OUTOF10: 2
PAINLEVEL_OUTOF10: 3
PAINLEVEL_OUTOF10: 5
PAINLEVEL_OUTOF10: 4
PAINLEVEL_OUTOF10: 4

## 2017-12-06 ASSESSMENT — PAIN DESCRIPTION - PAIN TYPE
TYPE: SURGICAL PAIN

## 2017-12-06 ASSESSMENT — PAIN DESCRIPTION - LOCATION
LOCATION: ABDOMEN

## 2017-12-06 ASSESSMENT — PAIN DESCRIPTION - ORIENTATION: ORIENTATION: LOWER

## 2017-12-06 ASSESSMENT — PAIN DESCRIPTION - DESCRIPTORS: DESCRIPTORS: DISCOMFORT

## 2017-12-06 NOTE — PROGRESS NOTES
Lera Mortimer is a 80 y.o. female patient.     Current Facility-Administered Medications   Medication Dose Route Frequency Provider Last Rate Last Dose    calcium replacement protocol   Other RX Placeholder Michael Hernandez DO        pantoprazole (PROTONIX) tablet 40 mg  40 mg Oral QAM AC Evalee Screen, CNP   40 mg at 12/05/17 0547    aluminum & magnesium hydroxide-simethicone (MAALOX) 200-200-20 MG/5ML suspension 30 mL  30 mL Oral Q6H PRN Evalee Screen, CNP        calcium carbonate (TUMS) chewable tablet 500 mg  500 mg Oral TID PRN Evalee Screen, CNP   500 mg at 12/04/17 2325    docusate sodium (COLACE) capsule 100 mg  100 mg Oral Daily Evalee Screen, CNP   100 mg at 12/05/17 1049    potassium replacement protocol   Other RX Placeholder Jhonny Ahn MD        LORazepam (ATIVAN) tablet 0.5 mg  0.5 mg Oral BID PRN Jhonny Ahn MD   0.5 mg at 12/05/17 2154    metoprolol tartrate (LOPRESSOR) tablet 12.5 mg  12.5 mg Oral BID Jhonny Ahn MD   12.5 mg at 12/05/17 1048    metoclopramide (REGLAN) injection 10 mg  10 mg Intravenous Q8H Mavis Merida MD   10 mg at 12/05/17 1519    HYDROcodone-acetaminophen (NORCO) 5-325 MG per tablet 1 tablet  1 tablet Oral Q6H PRN Mavis Merida MD   1 tablet at 12/05/17 1729    promethazine (PHENERGAN) injection 12.5 mg  12.5 mg Intramuscular Q6H PRN Mavis Merida MD   12.5 mg at 12/03/17 0544    diatrizoate meglumine-sodium (GASTROGRAFIN) 66-10 % solution 120 mL  120 mL Oral ONCE PRN Michael Hernandez DO   120 mL at 12/01/17 7733    PRESERVISION AREDS 2 CAPS 1 capsule  1 capsule Oral BID Michael Hernandez DO   1 capsule at 12/05/17 2148    sodium chloride flush 0.9 % injection 10 mL  10 mL Intravenous 2 times per day Michael Hernandez DO   10 mL at 12/03/17 9580    sodium chloride flush 0.9 % injection 10 mL  10 mL Intravenous PRN Michael Alias Wisser, DO        0.9 % sodium chloride infusion   Intravenous Continuous Michael Alias Wisser, DO 75 mL/hr at Normal effort and normal respiratory rate. Breath sounds clear to auscultation. Heart: Normal rate. Regular rhythm. S2 normal.    Abdomen: Abdomen is soft and distended (mild). There is incisional tenderness. Extremities: Normal range of motion. Pulses: Distal pulses are intact. Assessment:    Condition: In stable condition. Improving. (Perforated gastric  Ulcer  Stable      htn on meds and  Better     hgb  Stable      thyroid  Will resume   Home  meds ). Plan:   Encourage ambulation. Consults: physical therapy and occupational therapy. Administer medications as ordered. (Doing  Better  But  Still  Weak       Continue ambulation).        Sofia Hernandez MD  12/6/2017

## 2017-12-06 NOTE — PROGRESS NOTES
morphine  OBJECTIVE   CURRENT VITALS:  height is 5' 3\" (1.6 m) and weight is 115 lb (52.2 kg). Her oral temperature is 98.1 °F (36.7 °C). Her blood pressure is 133/65 and her pulse is 81. Her respiration is 16 and oxygen saturation is 96%.    Temperature Range (24h):Temp: 98.1 °F (36.7 °C) Temp  Av °F (36.7 °C)  Min: 96.8 °F (36 °C)  Max: 98.6 °F (37 °C)  BP Range (04S): Systolic (09IJW), PHILIP:242 , Min:103 , HLQ:917     Diastolic (26YAP), PTE:15, Min:57, Max:74    Pulse Range (24h): Pulse  Av.6  Min: 69  Max: 85  Respiration Range (24h): Resp  Av.2  Min: 15  Max: 18  Current Pulse Ox (24h):  SpO2: 96 %  Pulse Ox Range (24h):  SpO2  Av.2 %  Min: 95 %  Max: 97 %  Oxygen Amount and Delivery: O2 Flow Rate (L/min): 4 L/min  Incentive Spirometry Tx: Respiratory Effort: Accessory Muscle Use Treatment Tolerance: Fair Incentive Spirometry Goal (mL): 1000 mL Incentive Spirometry Achieved (mL): 500 mL    GENERAL: alert, no distress  LUNGS: clear to ausculation, without wheezes, rales or rhonci  HEART: normal rate and regular rhythm  ABDOMEN: mildly-distended, soft, incisional tenderness, bowel sounds present  INCISION: healing well, no significant drainage, no significant erythema  EXTERMITY: no cyanosis, clubbing or edema  In: 1964.4 [P.O.:640; I.V.:1324.4]  Out: 440 [Urine:350; Drains:90]  [REMOVED] Closed/Suction Drain Right Abdomen Bulb 19 [place]-Output (ml): 90 ml    Date 17 - 17   Shift  6544-2312 4321-2975 24 Hour Total   I  N  T  A  K  E   P.O.  (mL/kg/hr) 200  (0.5) 200  400    I.V.  (mL/kg) 533  (10.2)   533  (10.2)    Shift Total  (mL/kg) 733  (14.1) 200  (3.8)  933  (17.9)   O  U  T  P  U  T   Urine  (mL/kg/hr) 350  (0.8)   350    Shift Total  (mL/kg) 350  (6.7)   350  (6.7)   Weight (kg) 52.2 52.2 52.2 52.2     LABS     Recent Labs      17   0544  17   0547  17   0505   HGB   --   9.0*   --    HCT   --   26.6*   --    NA   --   139  140   K  4.1 3. 2*  3.4*   CL   --   107  105   CO2   --   20*  20*   BUN   --   13  10   CREATININE   --   0.7  0.7   CALCIUM   --   7.0*  7.1*      No results for input(s): PTT, INR in the last 72 hours. Invalid input(s): PT  No results for input(s): AST, ALT, BILITOT, BILIDIR, AMYLASE, LIPASE, LDH, LACTA in the last 72 hours. No results for input(s): TROPONINT in the last 72 hours. RADIOLOGY     FL UGI [[id number]] Resulted: 12/01/17 1146   Updated: 12/01/17 1148    Narrative:     PROCEDURE: FL UGI    CLINICAL INFORMATION: S/p gastric ulcer repair, r/o leak,  .    COMPARISON: No prior study. TECHNIQUE: The study was done with the dilute Gastrografin and thin barium. Initially the patient was administered dilute Gastrografin orally using a straw subsequently additional dilute Gastrografin was injected through the NG tube. Patient was also   administered thin barium through the NG tube. NG tube is flushed after use. Overhead images were done. A delayed upright abdomen was done. FINDINGS: Marked decreased peristalsis of the stomach. Extensive spontaneous gastroesophageal reflux. Eventual passage of barium and Gastrografin to the small bowel. No evidence of leak. On delayed image there is a still of contrast in the gastric cardia   and fundus. Free air seen below the hemidiaphragm consistent with recent surgery. Impression:     No evidence of the postsurgical gastric leak. Severe spontaneous gastroesophageal reflux. Electronically signed by Anil Morris DO on 12/6/2017 at 10:41 AM    Carmen Chow will require the following home care treatment: assisted commode. Home care will be necessary because of postsurgical state and deconditioning. The patient is in agreement to receiving home care.       Electronically signed by Anil Morris DO on 12/6/2017 at 1:49 PM

## 2017-12-06 NOTE — PROGRESS NOTES
Date: 2017  Patient Name: Briana Miranda        MRN: [id number]   Account: [de-identified]   : 1931  (80 y.o.)  Gender: female   Referring Practitioner: Dr. Gino Tucker MD  Diagnosis: Pneumoperitoneum  Additional Pertinent Hx:  After history and physical examination was performed, potential diagnostic and therapeutic modalities were discussed with the patient. She was brought to the operating room on 2017 for the [facility] [place] patch repair of gastric perforation. Briana Miranda requires a Bedside Commode to complete bathing, toileting, dressing and grooming tasks. Patient requires a Bedside Commode due to Upper Extremity/Lower Extremity Weakness and increased ease with toileting transfers. Without the Bedside Commode, Briana Miranda is at increased risk for falls and would require increased assistance for ADL's and mobility.

## 2017-12-06 NOTE — PROGRESS NOTES
Nutrition Assessment    Type and Reason for Visit: Initial (length of stay review)    Nutrition Recommendations: Continue current diet and ONS. Malnutrition Assessment:  · Malnutrition Status: At risk for malnutrition    Nutrition Diagnosis:   · Problem: Increased nutrient needs  · Etiology: related to Increased demand for energy/nutrients due to     Signs and symptoms:  as evidenced by  (s/p surgery)    Nutrition Assessment:  · Subjective Assessment: Patient is POD#7 repair of perforated gastric ulcer. Patient seen earlier today- reports fair appetite, has tolerated well her first 2 solid meals (dinner and breakfast today), states had lost weight in the past but weight now has been stable. Agreeable to trial magic cup supplement, states had drank ONS but was not fond of them in the past. Await return of bowel function, on colace, reglan. · Wound Type: Surgical Wound (abdominal incision 11/29/17: repair of perforated gastric ulcer)  · Current Nutrition Therapies:  · Oral Diet Orders: Cardiac, No Added Salt (3-4gm)   · Oral Diet intake: 1-25%, 26-50%, 51-75%, %  · Oral Nutrition Supplement (ONS) Orders: Frozen Oral Supplement (BID)  · ONS intake: Unable to assess (to begin)  · Anthropometric Measures:  · Ht: 5' 3\" (160 cm)   · Current Body Wt: 115 lb (52.2 kg)  · Admission Body Wt: 115 lb (52.2 kg) (11/29/17, ? actual vs. stated)  · Usual Body Wt:  (weight stable per patient, per EHR: 12/12/16 112#, 6/28/17 114# )  · Ideal Body Wt: 115 lb (52.2 kg),  · BMI Classification: BMI 18.5 - 24.9 Normal Weight  · Comparative Standards (Estimated Nutrition Needs):  · Estimated Daily Total Kcal: 9807-2847  · Estimated Daily Protein (g): 62-73    Estimated Intake vs Estimated Needs: Intake Improving    Nutrition Risk Level:  Moderate    Nutrition Interventions:   Continue current diet, Start ONS  Continued Inpatient Monitoring, Education Initiated (encouraged intake at best effort)    Nutrition Evaluation: · Evaluation: Goals set   · Goals: Patient will consume 75% or greater of meals during LOS. · Monitoring: Meal Intake, Supplement Intake, Diet Tolerance, Wound Healing, Weight, Pertinent Labs    See Adult Nutrition Doc Flowsheet for more detail.      Electronically signed by Susannah Williamson RD, LD on 12/6/17 at 2:14 PM    Contact Number: [phone]

## 2017-12-06 NOTE — PROGRESS NOTES
Denies    Objective  ADL  LE Dressing: Stand by assistance (Donning doffing socks )  Toileting: Stand by assistance  Additional Comments: Reviwed and educated patient and son on [facility] and AE for increased ease with self care skills. Patient and son would like to obtain RTS for patient upon discharge for increased ease with toilet transfers. Bed mobility  Sit to Supine: Minimal assistance  Scooting: Stand by assistance    Transfers  Sit to stand: Contact guard assistance (From recliner chair <> RTS )  Stand to sit: Contact guard assistance (RTS <> EOB )  Toilet Transfers  Toilet - Technique: Ambulating  Equipment Used: Grab bars  Toilet Transfer: Stand by assistance    Balance  Sitting Balance: Supervision (EOB )  Standing Balance: Stand by assistance     Time: x1 minute  Activity: toilet routine     Functional Mobility  Functional - Mobility Device: Rolling Walker  Activity: To/from bathroom  Assist Level: Contact guard assistance  Functional Mobility Comments: Patient completed functional mobility to/from bathroom and to EOB with CGA, slow pace, steady, no LOB      Activity Tolerance:  Activity Tolerance: Patient Tolerated treatment well;Patient limited by fatigue  Activity Tolerance: Requested to return to EOB at end of session     Assessment:  Performance deficits / Impairments: Decreased functional mobility , Decreased ADL status, Decreased endurance, Decreased strength  Prognosis: Good  Discharge Recommendations: [address] [facility], Patient would benefit from continued therapy after discharge    Patient Education:  Patient Education: Role of OT, transfer LB dressing AE    Equipment Recommendations:  Equipment Needed: Yes  Mobility Devices: ADL Assistive Devices  ADL Assistive Devices:  Toileting - Raised Toilet Seat with arms    Safety:  Safety Devices in place: Yes  Type of devices: Patient at risk for falls, Nurse notified, Call light within reach, Left in bed, Bed alarm in place, Gait

## 2017-12-06 NOTE — PROGRESS NOTES
[facility]    Time In: 3964  Time Out: 1345  Timed Code Treatment Minutes: 31 Minutes  Minutes: 31          Date: 2017  Patient Name: Hong Fischer,  Gender:  female        MRN: [id number]  : 1931  (80 y.o.)     Referring Practitioner: Deane Gosselin, MD  Diagnosis: Pneumoperitoneum  Additional Pertinent Hx: Per ED note, pt is a 80 y.o. female who presents to the ED for the evaluation of nausea, vomiting, and diarrhea. Patient states that she began to experience diarrhea and nausea yesterday morning, and that she had one episode of vomiting this morning. She reports that she is also experiencing generalized weakness, fatigue, a decreased appetite, and rhinorrhea. Patient denies experiencing any chest pain, shortness of breath, abdominal pain, fever, hematochezia, headache, dizziness, lightheadedness, or numbness. She also denies any recent sick exposure or antibiotic usage. Past Medical History:   Diagnosis Date    Alcohol abuse sober since Allyson Rodriguez    Depression     GERD (gastroesophageal reflux disease)     PUD  Dr. Lisa Celaya    Hyperlipidemia     Hypertension 12/3/2017    Hypothyroid     Neuropathy, peripheral ([address]     Osteoarthritis     Osteoporosis     Thyroid disease     hypothyroid    Vitamin D deficiency      Past Surgical History:   Procedure Laterality Date    CHOLECYSTECTOMY      COLONOSCOPY        [facility]    ENDOSCOPY, COLON, DIAGNOSTIC      [facility]    EYE SURGERY      HEMORRHOID SURGERY      HERNIA REPAIR      left    JOINT REPLACEMENT      bilat knees.  Dr. Steven Yip N/A 2017    EXPLORATORY LAPAROTOMY, PROBABLE ULCER REPAIR performed by Jo Saleh DO at [facility] ENDOSCOPY        [facility]       Restrictions/Precautions:  Restrictions/Precautions: Fall Risk, General Precautions            Position Activity Restriction  Other position/activity restrictions: abdominal incision         Subjective:  Chart Reviewed: Yes  Comments: Pt is s/p Henrry Reis patch repair of gastric perforation by Dr. Nate Khalil on 11/29/17  Subjective: RN approved session. Patient on commode, ready for ambulation with PT. Son present . Pain:   . No c/os pain    Social/Functional:  Lives With: Alone  Type of Home: Apartment (Senior Apartment )  Home Layout: One level  Home Access: Level entry  Home Equipment: Rolling walker, Cane, Quad cane     Objective:       Transfers  Sit to Stand: Supervision (from toilet )  Stand to sit: Supervision (to chair , good hand placement )       Ambulation 1  Surface: level tile  Device: Rolling Walker  Quality of Gait: decreased nathalia, forward flexed posture, decreased step length,  cues for posture and to stay closer to walker  Distance: 250 feet         Balance  Sitting - Static: Good  Sitting - Dynamic: Good  Standing - Static: Good  Standing - Dynamic: Good;-     Overall Orientation Status: Within Normal Limits       Exercises:  Exercises  Comments: Completed B LE exercises in supine and seated positions: ankle pumps, hip ABD/ADD, marches, long arc quads, quad sets x10-12 reps to increase strength for improved functional mobility. Activity Tolerance:  Activity Tolerance: Patient limited by pain; Patient limited by endurance    Assessment: Body structures, Functions, Activity limitations: Decreased functional mobility , Decreased endurance, Decreased strength  Assessment: Pt progressing in endurance and strength and steadiness with walker. Patient would benefit from continued skilled physical therapy to increase endurance and strength for improved functional mobility.   Prognosis: Excellent  REQUIRES PT FOLLOW UP: Yes  Discharge Recommendations: Continue to assess pending progress, Home with Home health PT    Patient

## 2017-12-06 NOTE — PLAN OF CARE
Problem: Falls - Risk of  Goal: Absence of falls  Outcome: Ongoing  Remains free from falls. Up with front wheeled walker. Gait steady. Problem: Pain:  Goal: Pain level will decrease  Pain level will decrease   Outcome: Ongoing  Rates pain a 4 on scale 0-10. Denies need for pain medication when offered    Problem: Cardiovascular  Goal: No DVT, peripheral vascular complications  Outcome: Ongoing  No signs of dvt. No c/o lower leg pain. No lower leg edema    Problem: GI  Goal: No bowel complications  Outcome: Ongoing  Bowel sounds active. Passing flatus. No bm this shift    Problem: Nutrition  Goal: Optimal nutrition therapy  Outcome: Ongoing  Tolerating general diet without problems. No nausea or vomiting    Problem: Skin Integrity/Risk  Goal: Wound healing  Outcome: Ongoing  Abdominal incision open to air. Staples intact. Skin warm and dry. No new skin issues noted    Comments: Care plan reviewed with patient. Patient verbalize understanding of the plan of care and contribute to goal setting. Plans to go home at discharge with support of family.  No anticipated needs voiced

## 2017-12-07 VITALS
HEART RATE: 78 BPM | DIASTOLIC BLOOD PRESSURE: 63 MMHG | HEIGHT: 63 IN | BODY MASS INDEX: 20.38 KG/M2 | RESPIRATION RATE: 16 BRPM | WEIGHT: 115 LBS | TEMPERATURE: 96.3 F | SYSTOLIC BLOOD PRESSURE: 134 MMHG | OXYGEN SATURATION: 95 %

## 2017-12-07 LAB
EKG ATRIAL RATE: 75 BPM
EKG P AXIS: 41 DEGREES
EKG P-R INTERVAL: 144 MS
EKG Q-T INTERVAL: 378 MS
EKG QRS DURATION: 74 MS
EKG QTC CALCULATION (BAZETT): 422 MS
EKG R AXIS: -22 DEGREES
EKG T AXIS: 83 DEGREES
EKG VENTRICULAR RATE: 75 BPM
POTASSIUM SERPL-SCNC: 3.4 MEQ/L (ref 3.5–5.2)

## 2017-12-07 PROCEDURE — 6370000000 HC RX 637 (ALT 250 FOR IP): Performed by: FAMILY MEDICINE

## 2017-12-07 PROCEDURE — 6370000000 HC RX 637 (ALT 250 FOR IP): Performed by: SURGERY

## 2017-12-07 PROCEDURE — 84132 ASSAY OF SERUM POTASSIUM: CPT

## 2017-12-07 PROCEDURE — 99024 POSTOP FOLLOW-UP VISIT: CPT | Performed by: SURGERY

## 2017-12-07 PROCEDURE — 2580000003 HC RX 258: Performed by: SURGERY

## 2017-12-07 PROCEDURE — 97535 SELF CARE MNGMENT TRAINING: CPT

## 2017-12-07 PROCEDURE — 6370000000 HC RX 637 (ALT 250 FOR IP): Performed by: NURSE PRACTITIONER

## 2017-12-07 PROCEDURE — 36415 COLL VENOUS BLD VENIPUNCTURE: CPT

## 2017-12-07 RX ORDER — POTASSIUM CHLORIDE 750 MG/1
40 TABLET, FILM COATED, EXTENDED RELEASE ORAL ONCE
Status: COMPLETED | OUTPATIENT
Start: 2017-12-07 | End: 2017-12-07

## 2017-12-07 RX ORDER — HYDROCODONE BITARTRATE AND ACETAMINOPHEN 5; 325 MG/1; MG/1
1 TABLET ORAL EVERY 6 HOURS PRN
Qty: 30 TABLET | Refills: 0 | Status: SHIPPED | OUTPATIENT
Start: 2017-12-07 | End: 2017-12-14

## 2017-12-07 RX ADMIN — POTASSIUM CHLORIDE 40 MEQ: 750 TABLET, FILM COATED, EXTENDED RELEASE ORAL at 09:42

## 2017-12-07 RX ADMIN — METOPROLOL TARTRATE 12.5 MG: 25 TABLET ORAL at 08:26

## 2017-12-07 RX ADMIN — PANTOPRAZOLE SODIUM 40 MG: 40 TABLET, DELAYED RELEASE ORAL at 06:18

## 2017-12-07 RX ADMIN — DOCUSATE SODIUM 100 MG: 100 CAPSULE ORAL at 08:26

## 2017-12-07 RX ADMIN — Medication 10 ML: at 08:25

## 2017-12-07 RX ADMIN — ALUMINUM HYDROXIDE, MAGNESIUM HYDROXIDE, AND SIMETHICONE 30 ML: 200; 200; 20 SUSPENSION ORAL at 10:57

## 2017-12-07 RX ADMIN — Medication 1 CAPSULE: at 08:26

## 2017-12-07 RX ADMIN — HYDROCODONE BITARTRATE AND ACETAMINOPHEN 1 TABLET: 5; 325 TABLET ORAL at 07:19

## 2017-12-07 ASSESSMENT — PAIN SCALES - GENERAL
PAINLEVEL_OUTOF10: 7
PAINLEVEL_OUTOF10: 6
PAINLEVEL_OUTOF10: 3
PAINLEVEL_OUTOF10: 1
PAINLEVEL_OUTOF10: 0

## 2017-12-07 ASSESSMENT — PAIN DESCRIPTION - DESCRIPTORS
DESCRIPTORS: ACHING
DESCRIPTORS: ACHING

## 2017-12-07 ASSESSMENT — PAIN DESCRIPTION - PAIN TYPE
TYPE: SURGICAL PAIN
TYPE: SURGICAL PAIN

## 2017-12-07 NOTE — PROGRESS NOTES
\  [facility]  [facility]ER[facility]  [facility] 5[facility]  DAILY NOTE    Time:  Time In: 3413  Time Out: 0911  Timed Code Treatment Minutes: 15 Minutes  Minutes: 15          Date: 2017  Patient Name: Eduardo Singleton,   Gender: female      Room: 5E-68/068-A  MRN: [id number]  : 1931  (80 y.o.)  Referring Practitioner: Dr. Kyle Martinez MD  Diagnosis: Pneumoperitoneum  Additional Pertinent Hx:  After history and physical examination was performed, potential diagnostic and therapeutic modalities were discussed with the patient. She was brought to the operating room on 2017 for the [facility] patch repair of gastric perforation. Restrictions/Precautions:  Restrictions/Precautions: Fall Risk, General Precautions            Position Activity Restriction  Other position/activity restrictions: abdominal incision         Past Medical History:   Diagnosis Date    Alcohol abuse sober since Vickie Ripper Colon polyp     Dr. Hari Meier    Depression     GERD (gastroesophageal reflux disease)     PUD  Dr. Kali Ornelas    Hyperlipidemia     Hypertension 12/3/2017    Hypothyroid     Neuropathy, peripheral ([address]     Osteoarthritis     Osteoporosis     Thyroid disease     hypothyroid    Vitamin D deficiency      Past Surgical History:   Procedure Laterality Date    CHOLECYSTECTOMY      [facility]OSCOPY        [facility]    ENDOSCOPY, COLON, DIAGNOSTIC      [facility]    EYE SURGERY      HEMORRHOID SURGERY      HERNIA REPAIR      left    JOINT REPLACEMENT      bilat knees.  Dr. Quintero Lewis N/A 2017    EXPLORATORY LAPAROTOMY, PROBABLE ULCER REPAIR performed by Mami Johnson DO at [facility] ENDOSCOPY        [facility]           Subjective   Subjective: cooperative, pleasant, hoping to go home today          Pain:  Pain Assessment  Patient Currently in Pain: Yes (abdomen)  Pain Level: 3 dressing AE    Equipment Recommendations:  Equipment Needed: Yes  Mobility Devices: ADL Assistive Devices  ADL Assistive Devices: Toileting - Raised Toilet Seat with arms    Safety:  Safety Devices in place: Yes  Type of devices: All fall risk precautions in place, Gait belt, Call light within reach, Bed alarm in place    Plan:  Times per week: 5x  Current Treatment Recommendations: Strengthening, Functional Mobility Training, Endurance Training, Pain Management, Self-Care / ADL, Patient/Caregiver Education & Training  Plan Comment: Pt would benefit from short stay on TCU with continued OT prior to returning to senior apartment living. Specific instructions for Next Treatment: Functional mobility; ADLs and use of adaptations if needed; standing tolerance    Goals:  Patient goals : \"I want to heal and get back to being active. \" pt states. Short term goals  Time Frame for Short term goals: 1 week  Short term goal 1: Pt will complete lower body ADLs while using any AE needed for pain control to increase her independence with self care. Short term goal 2: Pt will demonstrate functional mobility walking to/from the bathroom and in the hallway around obstacles with SBA to prepare for doing simple cooking tasks or self care at the sink. Short term goal 3: Pt will complete toileting routine including transfers to/from the elevated toilet seat with Supervision to increase her independence with self care. Short term goal 4: Pt will complete simple standing tasks for over 5 minute duration with SBA to increase her endurance for ease of showering. Long term goals  Time Frame for Long term goals : None secondary to short estimated length of stay.           AM-[facility] Inpatient Daily Activity Raw Score: 19  AM-PAC Inpatient ADL T-Scale Score : 40.22  ADL Inpatient CMS 0-100% Score: 42.8  ADL Inpatient CMS G-Code Modifier : CK

## 2017-12-07 NOTE — PROGRESS NOTES
sounds are normal.   There is incisional tenderness. Extremities: Normal range of motion. Pulses: Distal pulses are intact. Neurological: Patient is alert and oriented to person, place and time. Patient has normal reflexes. Assessment:    Condition: In stable condition. Improving.   (  gerd  Stable       perforated  Ulcer  Post  Surgery  Stable     post  Op  Anemia   Stable       htn on meds      K+  Corrected ). Plan:   Encourage ambulation. Start/continue incentive spirometry. Advance diet as tolerated. Administer medications as ordered. (No  celebrex       Arthritis  Pain  Stable       Anemia stable       Diet  Advance      possible  Home  soon).        Be Biggs MD  12/7/2017

## 2017-12-07 NOTE — PROGRESS NOTES
Ronelle Mohs Dr. Saratha Mann  Postoperative Progress Note    Pt Name: Sofy Simon  Medical Record Number: [id number]  Date of Birth 11/12/1931   Today's Date: 12/7/2017  ASSESSMENT   1. POD # 8 repair of perforated gastric ulcer  2. UGI 12/1 - no leak identified - Severe spontaneous gastroesophageal reflux   has a past medical history of Alcohol abuse; Anxiety; Colon polyp; Depression; GERD (gastroesophageal reflux disease); Hyperlipidemia; Hypertension; Hypothyroid; Neuropathy, peripheral ([address] Osteoarthritis; Osteoporosis; Thyroid disease; and Vitamin D deficiency. PLAN   1. Discharge home with home health  2. Rx Norco  3. No heavy lifting  4. F/U 1 week for staple removal.   [place] is doing better. Looks good. Had some anxiety last night. Workup negative She denies any vomiting. She is having bowel movements. She is tolerating a cardiac diet. Her pain is well controlled on current medications. She is moving better. CURRENT MEDICATIONS   Scheduled Meds:   calcium replacement protocol   Other RX Placeholder    pantoprazole  40 mg Oral QAM AC    docusate sodium  100 mg Oral Daily    potassium replacement protocol   Other RX Placeholder    metoprolol tartrate  12.5 mg Oral BID    PRESERVISION AREDS 2  1 capsule Oral BID    sodium chloride flush  10 mL Intravenous 2 times per day    sodium chloride flush  10 mL Intravenous 2 times per day    enoxaparin  40 mg Subcutaneous Q24H     Continuous Infusions:     PRN Meds:.aluminum & magnesium hydroxide-simethicone, calcium carbonate, LORazepam, HYDROcodone 5 mg - acetaminophen, promethazine, diatrizoate meglumine-sodium, sodium chloride flush, sodium chloride flush, acetaminophen, ondansetron, morphine **OR** morphine, morphine  OBJECTIVE   CURRENT VITALS:  height is 5' 3\" (1.6 m) and weight is 115 lb (52.2 kg). Her oral temperature is 96.3 °F (35.7 °C). Her blood pressure is 134/63 and her pulse is 78.  Her respiration

## 2017-12-07 NOTE — CARE COORDINATION
12/5/17, 12:39 PM      Ronel Cleaves day: 6  Location: [address] [place] [address] Reason for admit: Pneumoperitoneum [K66.8]   Procedure: 11-29-17 Per Dr. Maggi Burnham patch repair of gastric perforation   Treatment Plan of Care: IVF continue. Advanced to regular cardiac diet today. [place] for pain. PT/OT. Continue to monitor surgical site and bulb suction. Awaiting return of bowel function. K+ low today. Core Measures: VTE and SCIP  PCP: Eustaquio Hammans, MD  Discharge Plan: Plans home with new HH. SW following for needs.
12/6/17, 1:24 PM      Mikki Males day: 7  Location: [facility] Reason for admit: Pneumoperitoneum [K66.8]   Procedure: 11-29-17 Per Dr. Ida Todd patch repair of gastric perforation   Treatment Plan of Care: IV to INT now. Pain well controlled. Up in chair, talkative. Anxious to go home. Requests portable commode with handles. Core Measures: VTE  PCP: Carolee Holter, MD  Discharge Plan: Plans home with new Home Health. SW following.  [phone] page placed to Dr. Amy Bellamy to request order for portable commode (with arms) to be received prior to pt discharge. Dr. Amy Bellamy returned call and will place order. [phone] Call placed to Dominick Ruiz (DME) alerting to this order.
12/7/17, 2:44 PM    Patient discharged today to home alone with [facility] skilled nursing. Family transported patient home. Spoke with Pierre at [facility] informed patient discharged today. Discharge plan discussed by  and . Discharge plan reviewed with patient/ family. Patient/ family verbalize understanding of discharge plan and are in agreement with plan. Understanding was demonstrated using the teach back method.        IMM Letter  IMM Letter date given[de-identified] 11/30/17  IMM Letter time given[de-identified] 0800
DISCHARGE BARRIERS  11/30/17, 2:49 PM    Reason for Referral: \"Home Health request\"  Mental Status: Patient is alert and oriented  Decision Making: Patient makes own decisions  Family/Social/Home Environment: Spoke with patient, assessment completed. Patient lives alone in a one story home that is handicapped accessible with walk in shower with a bench and grab bars and a higher toilet. Patient is able to independently shower and dress. Patient had a cleaning lady every other week, she is able to take care of daily cleaning. Patient had home meals delivered. Patient has 3 children who live close and are very supportive. Patient has been going to outpatient PT at [facility]. Patient is agreeable to [facility] services at discharge. Current Services: Outpatient PT at [facility]  Current Equipment:cane and walker as needed  Payment Source:Medicare and AARP  Concerns or Barriers to Discharge: none  Collabrative List of ECF/HH were provided:List given to patient to discuss with her family. Teach Back Method used with patient regarding care plan and discharge planning. Patient verbalize understanding of the plan of care and contribute to goal setting. Anticipated Needs/Discharge Plan: Patient plans to return home alone at discharge and is agreeable to [facility] services. [facility] agency list left with patient to discuss with family. SW will follow tomorrow with patient.     Electronically signed by LASHONDA Mclean on 11/30/2017 at 2:49 PM
stop time:1749  BB within 24 hours of start time AND POD 1 or 2-Pt not on BB PTA  Atb last dose prior to 24 hours post-op (48 for CABG)-Cipro administered 0038 on 11-30-17. Correct DVT prophylaxis within 24 hours post-op-Yes  Berumen removed or reordered with reason by:Order to discontinue on 11-30-17. PCP: Leland Cloud MD  Readmission:   No  Risk Score: 1     Discharge Planning  Current Residence:     Living Arrangements:      Support Systems:     Current Services PTA:     Potential Assistance Needed:     Potential Assistance Purchasing Medications:     Does patient want to participate in local refill/ meds to beds program?     Type of Home Care Services:     Patient expects to be discharged to:     Expected Discharge date: Follow Up Appointment: Best Day/ Time:      Discharge Plan: Spoke with pt and son this morning. Pt comes from home alone. No current HH services. She and son are desiring some HH services at discharge. She does have home delivered meals and a person to clean her home. She has a PCP, transportation and no issues obtaining meds. SW consult placed.      Evaluation: yes

## 2017-12-07 NOTE — PROGRESS NOTES
21:33: Patient called out and stated she felt short of breath at rest. Patient was lying in bed. Sats 99% on room air. STAT EKG ordered per protocol. No changes from previous EKG. Called Wisser & notified him who stated to have Hospitalist handle the symptoms. Hospitalist ordered ABGs & CTA of chest. ABGs just slightly abnormal, but stable. CTA negative except for bilateral pleural effusions. Dr. Kaykay Grace notified. No new orders received. Patient stated in AM that she thinks she had a panic attack. Never needed supplemental oxygen the remainder of the shift. Vitals remained stable. Patient denies dyspnea and SOB the rest of AM and states she felt much better.

## 2017-12-08 ENCOUNTER — TELEPHONE (OUTPATIENT)
Dept: FAMILY MEDICINE CLINIC | Age: 82
End: 2017-12-08

## 2017-12-08 NOTE — TELEPHONE ENCOUNTER
Patient called stating that she was not taking any thyroid med while in the hospital and wants to know if she should just resume the dose she has at home? Please call her back.

## 2017-12-08 NOTE — PROGRESS NOTES
pulse 78, temperature 96.3 °F (35.7 °C), temperature source Oral, resp. rate 16, height 5' 3\" (1.6 m), weight 115 lb (52.2 kg), SpO2 95 %, not currently breastfeeding. Vital signs are normal.    Output: Producing urine and producing stool. HEENT: Normal HEENT exam.    Lungs:  Normal effort and normal respiratory rate. Breath sounds clear to auscultation. Heart: Normal rate. Regular rhythm. S1 normal and S2 normal.  No murmur. Abdomen: Abdomen is soft. Bowel sounds are normal.   There is incisional tenderness. Extremities: Normal range of motion. Pulses: Distal pulses are intact. Neurological: Patient is alert and oriented to person, place and time. Patient has normal reflexes. Pupils:  Pupils are equal, round, and reactive to light. Assessment:    Condition: In stable condition. Improving. (Htn  stable      diffuse  Arthritis      gastric  Ulcer  Perforation      Anemia  Stable     ). Plan:   Discharge home (per  surgery). Check weaning parameters. Advance diet as tolerated. Administer medications as ordered. (  stable        Diet  Stable     Home  Today with  Lopressor ).        Leland Cloud MD  12/8/2017

## 2017-12-11 ENCOUNTER — TELEPHONE (OUTPATIENT)
Dept: SURGERY | Age: 82
End: 2017-12-11

## 2017-12-11 ENCOUNTER — TELEPHONE (OUTPATIENT)
Dept: FAMILY MEDICINE CLINIC | Age: 82
End: 2017-12-11

## 2017-12-11 NOTE — TELEPHONE ENCOUNTER
physical therapist calling office-at pts home-incision intact-staples-draining serous when pt sitting-gone when pt laying-no redness-slight pink at distal end-does not look bad. Family to call office with any concerns.

## 2017-12-11 NOTE — TELEPHONE ENCOUNTER
Started on Lopressor 25 mg bid. Called this in. They noted however pulse rate on 12/10 was a little bit low as well as BP so we decreased the Lopressor to 12.5 mg bid. We'll be seeing her next week.   ET/lm

## 2017-12-14 NOTE — PROGRESS NOTES
medication list which includes the following prescription(s): metoprolol tartrate, denosumab, celecoxib, citalopram, omeprazole, lorazepam, levothyroxine, gabapentin, mirabegron er, and preservision/lutein. Allergies  is allergic to amoxicillin-pot clavulanate; statins support therapy; and sulfa antibiotics. Social History   reports that she quit smoking about 54 years ago. She has never used smokeless tobacco. She reports that she does not drink alcohol or use drugs. Health Screening Exams  Health Maintenance   Topic Date Due    DTaP/Tdap/Td vaccine (2 - Td) 06/26/2026    Zostavax vaccine  Completed    Flu vaccine  Completed    Pneumococcal low/med risk  Completed     Review of Systems  History obtained from the patient. Constitutional: Denies any fever, chills, fatigue. Wound: Denies any rash, skin color changes or wound problems. Resp: Denies any cough, shortness of breath. CV: Denies any chest pain, orthopnea or syncope. GI: Denies any nausea, vomiting, blood in the stool, constipation or diarrhea. Positive for incisional discomfort only. : Denies any hematuria, hesitancy or dysuria. OBJECTIVE    VITALS:  height is 5' 3\" (1.6 m) and weight is 110 lb (49.9 kg). Her tympanic temperature is 96.9 °F (36.1 °C). Her blood pressure is 120/64 and her pulse is 84. Her respiration is 16 and oxygen saturation is 96%. Pain Score:   0 - No pain  CONSTITUTIONAL: Alert and oriented times 3, no acute distress and cooperative to examination. SKIN: Skin color, texture, turgor normal. No rashes or lesions. INCISION: wound margins intact and healing well. No signs of infection. minimal, serous drainage. LUNGS: Chest expands equally bilaterally upon respiration, no accessory muscle used. Ausculation reveals no wheezes, rales or rhonchi. CARDIOVASCULAR: Heart sounds are normal.  Regular rate and rhythm without murmur, gallop or rub. Normal S1 and S2.  ABDOMEN: Soft, nondistended, no masses or organomegaly. Bowel sounds normal. laparotomy scar present with prominent healing ridge. No sign of recurrence, hematoma or seroma. Tenderness to palpation incisional only. Staples were removed without incident  NEUROLOGIC: No sensory or motor nerve irritation       Thank you for the interesting evaluation. Further recommendations as listed above.        Electronically signed by Binh Garcia DO on 12/15/2017 at 1:40 PM

## 2017-12-15 ENCOUNTER — OFFICE VISIT (OUTPATIENT)
Dept: SURGERY | Age: 82
End: 2017-12-15

## 2017-12-15 VITALS
BODY MASS INDEX: 19.49 KG/M2 | HEART RATE: 84 BPM | WEIGHT: 110 LBS | RESPIRATION RATE: 16 BRPM | OXYGEN SATURATION: 96 % | SYSTOLIC BLOOD PRESSURE: 120 MMHG | TEMPERATURE: 96.9 F | HEIGHT: 63 IN | DIASTOLIC BLOOD PRESSURE: 64 MMHG

## 2017-12-15 DIAGNOSIS — K25.5 GASTRIC PERFORATION (HCC): Primary | ICD-10-CM

## 2017-12-15 PROCEDURE — 99024 POSTOP FOLLOW-UP VISIT: CPT | Performed by: SURGERY

## 2017-12-18 DIAGNOSIS — F41.9 ANXIETY: ICD-10-CM

## 2017-12-18 NOTE — TELEPHONE ENCOUNTER
Pt called for a refill of:     LORazepam (ATIVAN) 0.5 MG tablet.     [facility]

## 2017-12-19 RX ORDER — LORAZEPAM 0.5 MG/1
TABLET ORAL
Qty: 120 TABLET | Refills: 0 | Status: SHIPPED | OUTPATIENT
Start: 2017-12-19 | End: 2018-02-22 | Stop reason: SDUPTHER

## 2017-12-20 NOTE — DISCHARGE SUMMARY
#8, Beni Thacker was tolerating a regular diet, having bowel movements,ambulating on her own accord and had adequate analgesia on oral pain medications, and had no signs of symptoms of complications. PHYSICAL EXAMINATION   Discharge Vitals:  height is 5' 3\" (1.6 m) and weight is 115 lb (52.2 kg). Her oral temperature is 96.3 °F (35.7 °C). Her blood pressure is 134/63 and her pulse is 78. Her respiration is 16 and oxygen saturation is 95%. General appearance - alert, well appearing, and in no distress  Chest - clear to ausculation  Heart - normal rate and regular rhythm  Abdomen - soft, incisional tenderness only, bowel sounds present  Neurological - motor and sensory grossly normal bilaterally  Musculoskeletal - full range of motion without pain  Extremities - peripheral pulses normal, no pedal edema, no clubbing or cyanosis  Incision: healing well, no drainage  LABS     Recent Labs      12/07/17   0445  12/06/17   0505  12/05/17   0547   12/02/17   0533   WBC   --    --    --    --   5.7   HGB   --    --   9.0*   < >  8.5*   HCT   --    --   26.6*   < >  25.7*   PLT   --    --    --    --   172   NA   --   140  139   < >  146*   K  3.4*  3.4*  3.2*   < >  3.5   CL   --   105  107   < >  110   CO2   --   20*  20*   < >  20*   BUN   --   10  13   < >  19   CREATININE   --   0.7  0.7   < >  0.6    < > = values in this interval not displayed.      DISCHARGE INSTRUCTIONS   Discharge Medications:      Medication List      CONTINUE taking these medications    celecoxib 200 MG capsule  Commonly known as:  CELEBREX  take 1 tablet by mouth once daily     citalopram 40 MG tablet  Commonly known as:  CELEXA  Take 0.5 tablets by mouth daily     denosumab 60 MG/ML Soln SC injection  Commonly known as:  PROLIA  Infuse 1 mL intravenously once for 1 dose     gabapentin 100 MG capsule  Commonly known as:  NEURONTIN  2  Pills  At  Night     levothyroxine 50 MCG tablet  Commonly known as:  SYNTHROID  take 1 tablet by mouth once daily Mirabegron ER 25 MG Tb24  Commonly known as:  MYRBETRIQ  One  A day     omeprazole 40 MG delayed release capsule  Commonly known as:  PRILOSEC  Take 1 capsule by mouth daily     PRESERVISION/LUTEIN Caps        STOP taking these medications    traMADol 50 MG tablet  Commonly known as:  ULTRAM        ASK your doctor about these medications    HYDROcodone-acetaminophen 5-325 MG per tablet  Commonly known as:  NORCO  Take 1 tablet by mouth every 6 hours as needed for Pain . Ask about: Should I take this medication? Where to Get Your Medications      You can get these medications from any pharmacy    Bring a paper prescription for each of these medications  · HYDROcodone-acetaminophen 5-325 MG per tablet       Diet: diet as tolerated  Activity: no lifting more than 10-20 lbs for next two weeks  Wound Care: Daily and as needed  Follow-up:  in the next few weeks with Leland Cloud MD, Follow up with Jeremy Watkins in 1-2 weeks.   Time Spent for discharge: 20 minutes      Electronically signed by Jeremy Watkins DO on 12/20/2017 at 11:36 AM

## 2017-12-21 ENCOUNTER — OFFICE VISIT (OUTPATIENT)
Dept: FAMILY MEDICINE CLINIC | Age: 82
End: 2017-12-21

## 2017-12-21 VITALS
SYSTOLIC BLOOD PRESSURE: 116 MMHG | DIASTOLIC BLOOD PRESSURE: 72 MMHG | BODY MASS INDEX: 19.03 KG/M2 | WEIGHT: 107.38 LBS | HEART RATE: 80 BPM | RESPIRATION RATE: 16 BRPM | HEIGHT: 63 IN

## 2017-12-21 DIAGNOSIS — K21.9 GASTROESOPHAGEAL REFLUX DISEASE WITHOUT ESOPHAGITIS: Primary | ICD-10-CM

## 2017-12-21 DIAGNOSIS — M54.50 CHRONIC MIDLINE LOW BACK PAIN WITHOUT SCIATICA: ICD-10-CM

## 2017-12-21 DIAGNOSIS — I10 ESSENTIAL HYPERTENSION: ICD-10-CM

## 2017-12-21 DIAGNOSIS — G89.29 CHRONIC MIDLINE LOW BACK PAIN WITHOUT SCIATICA: ICD-10-CM

## 2017-12-21 DIAGNOSIS — S76.011A HIP STRAIN, RIGHT, INITIAL ENCOUNTER: ICD-10-CM

## 2017-12-21 DIAGNOSIS — G60.9 IDIOPATHIC PERIPHERAL NEUROPATHY: ICD-10-CM

## 2017-12-21 DIAGNOSIS — K25.5 GASTRIC PERFORATION (HCC): ICD-10-CM

## 2017-12-21 DIAGNOSIS — M15.9 PRIMARY OSTEOARTHRITIS INVOLVING MULTIPLE JOINTS: ICD-10-CM

## 2017-12-21 LAB — HGB, POC: 9.3

## 2017-12-21 PROCEDURE — 99213 OFFICE O/P EST LOW 20 MIN: CPT | Performed by: FAMILY MEDICINE

## 2017-12-21 PROCEDURE — 85018 HEMOGLOBIN: CPT | Performed by: FAMILY MEDICINE

## 2017-12-21 RX ORDER — TRAMADOL HYDROCHLORIDE 50 MG/1
TABLET ORAL
Qty: 100 TABLET | Refills: 0
Start: 2017-12-21 | End: 2018-01-15 | Stop reason: SDUPTHER

## 2017-12-21 RX ORDER — GABAPENTIN 100 MG/1
CAPSULE ORAL
Qty: 180 CAPSULE | Refills: 1 | Status: SHIPPED | OUTPATIENT
Start: 2017-12-21 | End: 2018-02-01 | Stop reason: SDUPTHER

## 2017-12-21 ASSESSMENT — ENCOUNTER SYMPTOMS
WHEEZING: 0
SHORTNESS OF BREATH: 0
EYE PAIN: 0
CHEST TIGHTNESS: 0
NAUSEA: 0
CONSTIPATION: 0
ABDOMINAL PAIN: 0
COUGH: 0
SORE THROAT: 0

## 2017-12-21 NOTE — PROGRESS NOTES
Subjective:      Patient ID: Per Caraballo is a 80 y.o. female. Pud   With  Gastric  Ulcer and post  Surgery        Anemia   Blood  Loss     osteoarthritis    stable      Hypertension   This is a chronic problem. The problem has been resolved since onset. The problem is controlled. Pertinent negatives include no chest pain, headaches, neck pain, palpitations or shortness of breath. The current treatment provides significant improvement. There are no compliance problems. Past Medical History:   Diagnosis Date    Alcohol abuse sober since Brianne Carneyut jimmie Cruz    Depression     GERD (gastroesophageal reflux disease)     PUD 1998 Dr. Luis Dewitt    Hyperlipidemia     Hypertension 12/3/2017    Hypothyroid     Neuropathy, peripheral ([facility] [address]     Osteoarthritis     Osteoporosis     Thyroid disease     hypothyroid    Vitamin D deficiency      Review of Systems   Constitutional: Negative for appetite change, fatigue and fever. HENT: Negative for congestion, ear pain, postnasal drip and sore throat. Eyes: Negative for pain and visual disturbance. Respiratory: Negative for cough, chest tightness, shortness of breath and wheezing. Cardiovascular: Negative for chest pain, palpitations and leg swelling. Gastrointestinal: Negative for abdominal pain, constipation and nausea. Genitourinary: Negative for dysuria and frequency. Musculoskeletal: Negative for arthralgias, joint swelling, neck pain and neck stiffness. Skin: Negative for rash. Neurological: Negative for dizziness, weakness, numbness and headaches. Hematological: Negative for adenopathy. Does not bruise/bleed easily. Psychiatric/Behavioral: Negative for behavioral problems and sleep disturbance. The patient is not nervous/anxious.       /72 (Site: Right Arm, Position: Sitting, Cuff Size: Medium Adult)   Pulse 80   Resp 16   Ht 5' 3\" (1.6 m)   Wt 107 lb 6 oz (48.7 kg)   BMI 19.02 kg/m² Objective:   Physical Exam   Constitutional: She is oriented to person, place, and time. She appears well-developed and well-nourished. HENT:   Head: Normocephalic and atraumatic. Right Ear: External ear normal.   Left Ear: External ear normal.   Nose: Nose normal.   Mouth/Throat: Oropharynx is clear and moist.   Eyes: Conjunctivae and EOM are normal. Pupils are equal, round, and reactive to light. No scleral icterus. Neck: Normal range of motion. Neck supple. No JVD present. No thyromegaly present. Cardiovascular: Normal rate, regular rhythm, normal heart sounds and intact distal pulses. Pulmonary/Chest: Effort normal and breath sounds normal. She has no wheezes. She has no rales. Abdominal: Soft. Bowel sounds are normal. She exhibits no distension and no mass. There is no tenderness. Musculoskeletal: Normal range of motion. She exhibits no tenderness. Lymphadenopathy:     She has no cervical adenopathy. Neurological: She is alert and oriented to person, place, and time. She has normal reflexes. No cranial nerve deficit. Skin: Skin is warm and dry. No rash noted. Psychiatric: She has a normal mood and affect. Nursing note and vitals reviewed. Assessment:      1. Gastroesophageal reflux disease without esophagitis     2. Gastric perforation (HCC)  POCT hemoglobin   3. Essential hypertension     4. Hip strain, right, initial encounter  traMADol (ULTRAM) 50 MG tablet   5. Primary osteoarthritis involving multiple joints  traMADol (ULTRAM) 50 MG tablet   6. Chronic midline low back pain without sciatica  traMADol (ULTRAM) 50 MG tablet   7. Idiopathic peripheral neuropathy  gabapentin (NEURONTIN) 100 MG capsule         Plan:      Current Outpatient Prescriptions   Medication Sig Dispense Refill    traMADol (ULTRAM) 50 MG tablet take 1 to 2 tablets by mouth every 6 hours if needed for pain.  100 tablet 0    gabapentin (NEURONTIN) 100 MG capsule 3  Pills  At  Night 180 capsule 1   

## 2017-12-28 DIAGNOSIS — G60.9 IDIOPATHIC PERIPHERAL NEUROPATHY: ICD-10-CM

## 2017-12-28 RX ORDER — GABAPENTIN 300 MG/1
300 CAPSULE ORAL NIGHTLY
Qty: 30 CAPSULE | Refills: 0 | Status: SHIPPED | OUTPATIENT
Start: 2017-12-28 | End: 2017-12-29 | Stop reason: SDUPTHER

## 2017-12-28 NOTE — PROGRESS NOTES
normal. laparotomy scar present. No sign of recurrence, hematoma or seroma. Tenderness to palpation incisional only. NEUROLOGIC: No sensory or motor nerve irritation       Thank you for the interesting evaluation. Further recommendations as listed above.        Electronically signed by Polo Schwarz DO on 12/29/2017 at 1:00 PM

## 2017-12-29 ENCOUNTER — OFFICE VISIT (OUTPATIENT)
Dept: SURGERY | Age: 82
End: 2017-12-29

## 2017-12-29 VITALS
HEART RATE: 76 BPM | TEMPERATURE: 98.3 F | SYSTOLIC BLOOD PRESSURE: 120 MMHG | DIASTOLIC BLOOD PRESSURE: 68 MMHG | RESPIRATION RATE: 18 BRPM | BODY MASS INDEX: 19.49 KG/M2 | WEIGHT: 110 LBS

## 2017-12-29 DIAGNOSIS — K25.5 GASTRIC PERFORATION (HCC): Primary | ICD-10-CM

## 2017-12-29 PROCEDURE — 99024 POSTOP FOLLOW-UP VISIT: CPT | Performed by: SURGERY

## 2017-12-29 NOTE — LETTER
[facility]  Trent Matos  Phone- [phone]  Fax [phone]    Pt Name: Lay Gallo  Medical Record Number: [id number]  Date of Birth 11/12/1931   Today's Date: 12/29/2017    Levy Angela was evaluated in the office today. My assessment and plans are listed below. ASSESSMENT        1. Gastric perforation ([address]      S/p gastric repair 11/29/17   Incision is clean and intact or healing as expected  PLANS:        Pathology reviewed with the patient who understands. All questions were answered. Patient Instructions   No lifting, pushing or pulling more than 50 lbs for 2 weeks, then 80 lbs for 2 weeks. No restrictions after 4 weeks. 6 weeks postop may resume Celebrex as long as still on PPI therapy  Follow up: Return for As needed. Instructed to call if any concerns. If I can provide any additional assistance or you have any concerns, please feel free to contact me. Thank you for allowing to participate in the care of your patients. Sincerely,      Trent Osman

## 2018-01-15 DIAGNOSIS — S76.011A HIP STRAIN, RIGHT, INITIAL ENCOUNTER: ICD-10-CM

## 2018-01-15 DIAGNOSIS — M15.9 PRIMARY OSTEOARTHRITIS INVOLVING MULTIPLE JOINTS: ICD-10-CM

## 2018-01-15 DIAGNOSIS — G89.29 CHRONIC MIDLINE LOW BACK PAIN WITHOUT SCIATICA: ICD-10-CM

## 2018-01-15 DIAGNOSIS — M54.50 CHRONIC MIDLINE LOW BACK PAIN WITHOUT SCIATICA: ICD-10-CM

## 2018-01-16 RX ORDER — TRAMADOL HYDROCHLORIDE 50 MG/1
TABLET ORAL
Qty: 100 TABLET | Refills: 0 | Status: SHIPPED | OUTPATIENT
Start: 2018-01-16 | End: 2018-02-09 | Stop reason: SDUPTHER

## 2018-02-01 ENCOUNTER — OFFICE VISIT (OUTPATIENT)
Dept: FAMILY MEDICINE CLINIC | Age: 83
End: 2018-02-01

## 2018-02-01 VITALS
RESPIRATION RATE: 14 BRPM | WEIGHT: 111.38 LBS | SYSTOLIC BLOOD PRESSURE: 110 MMHG | HEIGHT: 63 IN | DIASTOLIC BLOOD PRESSURE: 62 MMHG | HEART RATE: 68 BPM | BODY MASS INDEX: 19.73 KG/M2

## 2018-02-01 DIAGNOSIS — D62 ACUTE BLOOD LOSS AS CAUSE OF POSTOPERATIVE ANEMIA: ICD-10-CM

## 2018-02-01 DIAGNOSIS — G60.9 IDIOPATHIC PERIPHERAL NEUROPATHY: ICD-10-CM

## 2018-02-01 DIAGNOSIS — K25.1 ACUTE GASTRIC ULCER WITH PERFORATION (HCC): Primary | ICD-10-CM

## 2018-02-01 PROBLEM — K25.9 GASTRIC ULCER: Status: ACTIVE | Noted: 2017-11-01

## 2018-02-01 LAB — HGB, POC: 10.3

## 2018-02-01 PROCEDURE — 85018 HEMOGLOBIN: CPT | Performed by: FAMILY MEDICINE

## 2018-02-01 PROCEDURE — 99213 OFFICE O/P EST LOW 20 MIN: CPT | Performed by: FAMILY MEDICINE

## 2018-02-01 RX ORDER — GABAPENTIN 100 MG/1
CAPSULE ORAL
Qty: 270 CAPSULE | Refills: 1 | Status: SHIPPED | OUTPATIENT
Start: 2018-02-01 | End: 2018-04-03 | Stop reason: DRUGHIGH

## 2018-02-01 ASSESSMENT — ENCOUNTER SYMPTOMS
COUGH: 0
NAUSEA: 0
ABDOMINAL PAIN: 0
EYE PAIN: 0
CHEST TIGHTNESS: 0
SORE THROAT: 0
WHEEZING: 0
SHORTNESS OF BREATH: 0
CONSTIPATION: 0

## 2018-02-01 NOTE — PROGRESS NOTES
Normocephalic and atraumatic. Right Ear: External ear normal.   Left Ear: External ear normal.   Nose: Nose normal.   Mouth/Throat: Oropharynx is clear and moist.   Eyes: Conjunctivae and EOM are normal. Pupils are equal, round, and reactive to light. No scleral icterus. Neck: Normal range of motion. Neck supple. No JVD present. No thyromegaly present. Cardiovascular: Normal rate, regular rhythm, normal heart sounds and intact distal pulses. Pulmonary/Chest: Effort normal and breath sounds normal. She has no wheezes. She has no rales. Abdominal: Soft. Bowel sounds are normal. She exhibits no distension and no mass. There is no tenderness. Musculoskeletal: Normal range of motion. She exhibits no tenderness. Lymphadenopathy:     She has no cervical adenopathy. Neurological: She is alert and oriented to person, place, and time. She has normal reflexes. No cranial nerve deficit. Skin: Skin is warm and dry. No rash noted. Psychiatric: She has a normal mood and affect. Nursing note and vitals reviewed. Assessment:      1. Acute gastric ulcer with perforation ([address]     2. Idiopathic peripheral neuropathy  gabapentin (NEURONTIN) 100 MG capsule   3. Acute blood loss as cause of postoperative anemia  POCT hemoglobin         Plan:      Current Outpatient Prescriptions   Medication Sig Dispense Refill    gabapentin (NEURONTIN) 100 MG capsule 3  Pills  At  Night. 270 capsule 1    traMADol (ULTRAM) 50 MG tablet take 1 to 2 tablets by mouth every 6 hours if needed for pain. 100 tablet 0    LORazepam (ATIVAN) 0.5 MG tablet take 1 tablet by mouth every 6 hours if needed for anxiety.  120 tablet 0    metoprolol tartrate (LOPRESSOR) 25 MG tablet Take 1 tablet by mouth 2 times daily 60 tablet 5    citalopram (CELEXA) 40 MG tablet Take 0.5 tablets by mouth daily 45 tablet 1    omeprazole (PRILOSEC) 40 MG delayed release capsule Take 1 capsule by mouth daily 90 capsule 1    levothyroxine (SYNTHROID) 50

## 2018-02-08 DIAGNOSIS — M54.50 CHRONIC MIDLINE LOW BACK PAIN WITHOUT SCIATICA: ICD-10-CM

## 2018-02-08 DIAGNOSIS — G89.29 CHRONIC MIDLINE LOW BACK PAIN WITHOUT SCIATICA: ICD-10-CM

## 2018-02-08 DIAGNOSIS — S76.011A HIP STRAIN, RIGHT, INITIAL ENCOUNTER: ICD-10-CM

## 2018-02-08 DIAGNOSIS — M15.9 PRIMARY OSTEOARTHRITIS INVOLVING MULTIPLE JOINTS: ICD-10-CM

## 2018-02-09 DIAGNOSIS — M15.9 PRIMARY OSTEOARTHRITIS INVOLVING MULTIPLE JOINTS: ICD-10-CM

## 2018-02-09 DIAGNOSIS — M54.50 CHRONIC MIDLINE LOW BACK PAIN WITHOUT SCIATICA: ICD-10-CM

## 2018-02-09 DIAGNOSIS — G89.29 CHRONIC MIDLINE LOW BACK PAIN WITHOUT SCIATICA: ICD-10-CM

## 2018-02-09 DIAGNOSIS — S76.011A HIP STRAIN, RIGHT, INITIAL ENCOUNTER: ICD-10-CM

## 2018-02-12 ENCOUNTER — TELEPHONE (OUTPATIENT)
Dept: FAMILY MEDICINE CLINIC | Age: 83
End: 2018-02-12

## 2018-02-12 RX ORDER — CEFUROXIME AXETIL 250 MG/1
250 TABLET ORAL 2 TIMES DAILY
Qty: 20 TABLET | Refills: 0 | Status: SHIPPED | OUTPATIENT
Start: 2018-02-12 | End: 2018-02-22

## 2018-02-12 RX ORDER — TRAMADOL HYDROCHLORIDE 50 MG/1
TABLET ORAL
Qty: 100 TABLET | Refills: 0 | OUTPATIENT
Start: 2018-02-12 | End: 2018-03-11

## 2018-02-12 RX ORDER — TRAMADOL HYDROCHLORIDE 50 MG/1
TABLET ORAL
Qty: 150 TABLET | Refills: 0 | OUTPATIENT
Start: 2018-02-12 | End: 2018-03-26 | Stop reason: SDUPTHER

## 2018-02-22 DIAGNOSIS — E03.4 HYPOTHYROIDISM DUE TO ACQUIRED ATROPHY OF THYROID: ICD-10-CM

## 2018-02-22 DIAGNOSIS — F41.9 ANXIETY: ICD-10-CM

## 2018-02-23 ENCOUNTER — NURSE ONLY (OUTPATIENT)
Dept: FAMILY MEDICINE CLINIC | Age: 83
End: 2018-02-23

## 2018-02-23 DIAGNOSIS — R35.0 URINARY FREQUENCY: Primary | ICD-10-CM

## 2018-02-23 LAB
BACTERIA URINE, POC: NORMAL
BILIRUBIN URINE: 0 MG/DL
BLOOD, URINE: NEGATIVE
CASTS URINE, POC: NORMAL
CLARITY: CLEAR
COLOR: YELLOW
CRYSTALS URINE, POC: NORMAL
EPI CELLS URINE, POC: NORMAL
GLUCOSE URINE: NEGATIVE
KETONES, URINE: NEGATIVE
LEUKOCYTE EST, POC: NEGATIVE
NITRITE, URINE: NEGATIVE
PH UA: 6.5 (ref 4.5–8)
PROTEIN UA: NEGATIVE
RBC URINE, POC: NORMAL
SPECIFIC GRAVITY UA: 1.01 (ref 1–1.03)
UROBILINOGEN, URINE: NORMAL
WBC URINE, POC: NORMAL
YEAST URINE, POC: NORMAL

## 2018-02-23 PROCEDURE — 81000 URINALYSIS NONAUTO W/SCOPE: CPT | Performed by: FAMILY MEDICINE

## 2018-02-23 RX ORDER — LORAZEPAM 0.5 MG/1
TABLET ORAL
Qty: 120 TABLET | Refills: 0 | Status: SHIPPED | OUTPATIENT
Start: 2018-02-23 | End: 2018-04-24 | Stop reason: SDUPTHER

## 2018-02-23 RX ORDER — LEVOTHYROXINE SODIUM 0.05 MG/1
TABLET ORAL
Qty: 90 TABLET | Refills: 1 | Status: SHIPPED | OUTPATIENT
Start: 2018-02-23 | End: 2018-08-24 | Stop reason: SDUPTHER

## 2018-03-05 ENCOUNTER — TELEPHONE (OUTPATIENT)
Dept: FAMILY MEDICINE CLINIC | Age: 83
End: 2018-03-05

## 2018-03-05 DIAGNOSIS — G89.29 CHRONIC MIDLINE LOW BACK PAIN WITHOUT SCIATICA: ICD-10-CM

## 2018-03-05 DIAGNOSIS — M54.50 CHRONIC MIDLINE LOW BACK PAIN WITHOUT SCIATICA: ICD-10-CM

## 2018-03-05 DIAGNOSIS — R53.81 PHYSICAL DECONDITIONING: Primary | ICD-10-CM

## 2018-03-09 ENCOUNTER — HOSPITAL ENCOUNTER (OUTPATIENT)
Dept: CT IMAGING | Age: 83
Discharge: HOME | End: 2018-03-09
Payer: MEDICARE

## 2018-03-09 ENCOUNTER — HOSPITAL ENCOUNTER (OUTPATIENT)
Age: 83
Discharge: HOME | End: 2018-03-09
Payer: MEDICARE

## 2018-03-09 DIAGNOSIS — D64.9 ANEMIA, UNSPECIFIED TYPE: ICD-10-CM

## 2018-03-09 DIAGNOSIS — R10.9 ABDOMINAL PAIN, UNSPECIFIED ABDOMINAL LOCATION: ICD-10-CM

## 2018-03-09 LAB
ALBUMIN SERPL-MCNC: 4.2 G/DL (ref 3.5–5.1)
ALP BLD-CCNC: 74 U/L (ref 38–126)
ALT SERPL-CCNC: 16 U/L (ref 11–66)
ANION GAP SERPL CALCULATED.3IONS-SCNC: 10 MEQ/L (ref 8–16)
AST SERPL-CCNC: 30 U/L (ref 5–40)
BILIRUB SERPL-MCNC: 0.2 MG/DL (ref 0.3–1.2)
BUN BLDV-MCNC: 15 MG/DL (ref 7–22)
CALCIUM SERPL-MCNC: 10.3 MG/DL (ref 8.5–10.5)
CHLORIDE BLD-SCNC: 100 MEQ/L (ref 98–111)
CO2: 28 MEQ/L (ref 23–33)
CREAT SERPL-MCNC: 1 MG/DL (ref 0.4–1.2)
GFR SERPL CREATININE-BSD FRML MDRD: 53 ML/MIN/1.73M2
GLUCOSE BLD-MCNC: 85 MG/DL (ref 70–108)
HCT VFR BLD CALC: 34.1 % (ref 37–47)
HEMOGLOBIN: 11.5 GM/DL (ref 12–16)
MCH RBC QN AUTO: 30.1 PG (ref 27–31)
MCHC RBC AUTO-ENTMCNC: 33.7 GM/DL (ref 33–37)
MCV RBC AUTO: 89.4 FL (ref 81–99)
PATHOLOGIST REVIEW: ABNORMAL
PDW BLD-RTO: 14.2 % (ref 11.5–14.5)
PLATELET # BLD: 206 THOU/MM3 (ref 130–400)
PMV BLD AUTO: 8.8 FL (ref 7.4–10.4)
POC CREATININE WHOLE BLOOD: 1 MG/DL (ref 0.5–1.2)
POTASSIUM SERPL-SCNC: 4.9 MEQ/L (ref 3.5–5.2)
RBC # BLD: 3.82 MILL/MM3 (ref 4.2–5.4)
SODIUM BLD-SCNC: 138 MEQ/L (ref 135–145)
TOTAL PROTEIN: 7 G/DL (ref 6.1–8)
WBC # BLD: 2.8 THOU/MM3 (ref 4.8–10.8)

## 2018-03-09 PROCEDURE — 82565 ASSAY OF CREATININE: CPT

## 2018-03-09 PROCEDURE — 80053 COMPREHEN METABOLIC PANEL: CPT

## 2018-03-09 PROCEDURE — 36415 COLL VENOUS BLD VENIPUNCTURE: CPT

## 2018-03-09 PROCEDURE — 6360000004 HC RX CONTRAST MEDICATION: Performed by: NURSE PRACTITIONER

## 2018-03-09 PROCEDURE — 74177 CT ABD & PELVIS W/CONTRAST: CPT

## 2018-03-09 PROCEDURE — 85027 COMPLETE CBC AUTOMATED: CPT

## 2018-03-09 RX ADMIN — IOPAMIDOL 85 ML: 755 INJECTION, SOLUTION INTRAVENOUS at 15:06

## 2018-03-09 RX ADMIN — IOHEXOL 50 ML: 240 INJECTION, SOLUTION INTRATHECAL; INTRAVASCULAR; INTRAVENOUS; ORAL at 15:06

## 2018-03-26 DIAGNOSIS — M15.9 PRIMARY OSTEOARTHRITIS INVOLVING MULTIPLE JOINTS: ICD-10-CM

## 2018-03-26 DIAGNOSIS — S76.011A HIP STRAIN, RIGHT, INITIAL ENCOUNTER: ICD-10-CM

## 2018-03-26 DIAGNOSIS — M54.50 CHRONIC MIDLINE LOW BACK PAIN WITHOUT SCIATICA: ICD-10-CM

## 2018-03-26 DIAGNOSIS — G89.29 CHRONIC MIDLINE LOW BACK PAIN WITHOUT SCIATICA: ICD-10-CM

## 2018-03-27 ENCOUNTER — HOSPITAL ENCOUNTER (OUTPATIENT)
Dept: WOMENS IMAGING | Age: 83
Discharge: HOME | End: 2018-03-27
Payer: MEDICARE

## 2018-03-27 DIAGNOSIS — R92.2 BREAST DENSITY: ICD-10-CM

## 2018-03-27 DIAGNOSIS — Z09 FOLLOW-UP EXAM, 3-6 MONTHS SINCE PREVIOUS EXAM: ICD-10-CM

## 2018-03-27 PROCEDURE — G0279 TOMOSYNTHESIS, MAMMO: HCPCS

## 2018-03-27 RX ORDER — TRAMADOL HYDROCHLORIDE 50 MG/1
TABLET ORAL
Qty: 150 TABLET | Refills: 0 | Status: SHIPPED | OUTPATIENT
Start: 2018-03-27 | End: 2018-04-30 | Stop reason: SDUPTHER

## 2018-04-03 ENCOUNTER — OFFICE VISIT (OUTPATIENT)
Dept: FAMILY MEDICINE CLINIC | Age: 83
End: 2018-04-03

## 2018-04-03 VITALS
BODY MASS INDEX: 19.67 KG/M2 | SYSTOLIC BLOOD PRESSURE: 110 MMHG | WEIGHT: 111 LBS | HEIGHT: 63 IN | HEART RATE: 60 BPM | RESPIRATION RATE: 12 BRPM | DIASTOLIC BLOOD PRESSURE: 62 MMHG

## 2018-04-03 DIAGNOSIS — M81.0 AGE-RELATED OSTEOPOROSIS WITHOUT CURRENT PATHOLOGICAL FRACTURE: ICD-10-CM

## 2018-04-03 DIAGNOSIS — G60.9 IDIOPATHIC PERIPHERAL NEUROPATHY: ICD-10-CM

## 2018-04-03 DIAGNOSIS — E78.00 PURE HYPERCHOLESTEROLEMIA: ICD-10-CM

## 2018-04-03 DIAGNOSIS — K21.9 GASTROESOPHAGEAL REFLUX DISEASE WITHOUT ESOPHAGITIS: Primary | ICD-10-CM

## 2018-04-03 DIAGNOSIS — K25.5 GASTRIC ULCER WITH PERFORATION, UNSPECIFIED CHRONICITY (HCC): ICD-10-CM

## 2018-04-03 DIAGNOSIS — E03.4 HYPOTHYROIDISM DUE TO ACQUIRED ATROPHY OF THYROID: ICD-10-CM

## 2018-04-03 DIAGNOSIS — F41.9 ANXIETY: ICD-10-CM

## 2018-04-03 DIAGNOSIS — F33.41 RECURRENT MAJOR DEPRESSIVE DISORDER, IN PARTIAL REMISSION (HCC): ICD-10-CM

## 2018-04-03 PROCEDURE — 99213 OFFICE O/P EST LOW 20 MIN: CPT | Performed by: FAMILY MEDICINE

## 2018-04-03 RX ORDER — GABAPENTIN 100 MG/1
100 CAPSULE ORAL NIGHTLY
Qty: 90 CAPSULE | Refills: 1 | Status: SHIPPED
Start: 2018-04-03 | End: 2018-12-14 | Stop reason: SDUPTHER

## 2018-04-03 RX ORDER — PANTOPRAZOLE SODIUM 40 MG/1
TABLET, DELAYED RELEASE ORAL
Refills: 0 | COMMUNITY
Start: 2018-03-09 | End: 2018-10-23 | Stop reason: SDUPTHER

## 2018-04-03 ASSESSMENT — ENCOUNTER SYMPTOMS
CONSTIPATION: 0
ABDOMINAL PAIN: 0
SHORTNESS OF BREATH: 0
EYE PAIN: 0
NAUSEA: 0
SORE THROAT: 0
CHEST TIGHTNESS: 0
COUGH: 0
WHEEZING: 0

## 2018-04-24 DIAGNOSIS — F41.9 ANXIETY: ICD-10-CM

## 2018-04-25 RX ORDER — LORAZEPAM 0.5 MG/1
TABLET ORAL
Qty: 120 TABLET | Refills: 0 | Status: SHIPPED | OUTPATIENT
Start: 2018-04-25 | End: 2018-06-20 | Stop reason: SDUPTHER

## 2018-04-30 DIAGNOSIS — M54.50 CHRONIC MIDLINE LOW BACK PAIN WITHOUT SCIATICA: ICD-10-CM

## 2018-04-30 DIAGNOSIS — G89.29 CHRONIC MIDLINE LOW BACK PAIN WITHOUT SCIATICA: ICD-10-CM

## 2018-04-30 DIAGNOSIS — M15.9 PRIMARY OSTEOARTHRITIS INVOLVING MULTIPLE JOINTS: ICD-10-CM

## 2018-04-30 DIAGNOSIS — S76.011A HIP STRAIN, RIGHT, INITIAL ENCOUNTER: ICD-10-CM

## 2018-05-02 RX ORDER — TRAMADOL HYDROCHLORIDE 50 MG/1
TABLET ORAL
Qty: 150 TABLET | Refills: 0 | Status: SHIPPED | OUTPATIENT
Start: 2018-05-02 | End: 2018-06-07 | Stop reason: SDUPTHER

## 2018-05-29 ENCOUNTER — HOSPITAL ENCOUNTER (OUTPATIENT)
Dept: NURSING | Age: 83
Discharge: HOME | End: 2018-05-29
Payer: MEDICARE

## 2018-05-29 VITALS
DIASTOLIC BLOOD PRESSURE: 68 MMHG | HEART RATE: 72 BPM | RESPIRATION RATE: 18 BRPM | SYSTOLIC BLOOD PRESSURE: 126 MMHG | TEMPERATURE: 96.7 F

## 2018-05-29 DIAGNOSIS — M81.0 AGE-RELATED OSTEOPOROSIS WITHOUT CURRENT PATHOLOGICAL FRACTURE: ICD-10-CM

## 2018-05-29 PROCEDURE — 6360000002 HC RX W HCPCS: Performed by: FAMILY MEDICINE

## 2018-05-29 PROCEDURE — 96372 THER/PROPH/DIAG INJ SC/IM: CPT

## 2018-05-29 RX ADMIN — DENOSUMAB 60 MG: 60 INJECTION SUBCUTANEOUS at 11:22

## 2018-05-29 ASSESSMENT — PAIN DESCRIPTION - DESCRIPTORS: DESCRIPTORS: ACHING

## 2018-05-29 ASSESSMENT — PAIN - FUNCTIONAL ASSESSMENT: PAIN_FUNCTIONAL_ASSESSMENT: 0-10

## 2018-05-29 NOTE — PROGRESS NOTES
80 Alert female admitted for #2 prolia injection, procedure reviewed with pt verbalized understanding. Pt rights and responsibilities offered to pt to read. [facility] instructions to pt and family, verbalized understanding.   1 Discharged ambulatory stable home with family                __met__ Safety:       (Environmental)  [employer] to environment   Ensure ID band is correct and in place/ allergy band as needed   Assess for fall risk   Initiate fall precautions as applicable (fall band, side rails, etc.)   Call light within reach   Bed in low position/ wheels locked    __nm__ Pain:        Assess pain level and characteristics   Administer analgesics as ordered   Assess effectiveness of pain management and report to MD as needed    __met__ Knowledge Deficit:   Assess baseline knowledge   Provide teaching at level of understanding   Provide teaching via preferred learning method   Evaluate teaching effectiveness    ___

## 2018-05-31 DIAGNOSIS — F41.9 ANXIETY: ICD-10-CM

## 2018-06-01 ENCOUNTER — TELEPHONE (OUTPATIENT)
Dept: FAMILY MEDICINE CLINIC | Age: 83
End: 2018-06-01

## 2018-06-01 RX ORDER — CITALOPRAM 40 MG/1
20 TABLET ORAL DAILY
Qty: 45 TABLET | Refills: 1 | Status: SHIPPED | OUTPATIENT
Start: 2018-06-01 | End: 2018-11-25 | Stop reason: SDUPTHER

## 2018-06-07 DIAGNOSIS — M15.9 PRIMARY OSTEOARTHRITIS INVOLVING MULTIPLE JOINTS: ICD-10-CM

## 2018-06-07 DIAGNOSIS — S76.011A HIP STRAIN, RIGHT, INITIAL ENCOUNTER: ICD-10-CM

## 2018-06-07 DIAGNOSIS — G89.29 CHRONIC MIDLINE LOW BACK PAIN WITHOUT SCIATICA: ICD-10-CM

## 2018-06-07 DIAGNOSIS — M54.50 CHRONIC MIDLINE LOW BACK PAIN WITHOUT SCIATICA: ICD-10-CM

## 2018-06-07 RX ORDER — TRAMADOL HYDROCHLORIDE 50 MG/1
TABLET ORAL
Qty: 150 TABLET | Refills: 0 | Status: SHIPPED | OUTPATIENT
Start: 2018-06-07 | End: 2018-07-09 | Stop reason: SDUPTHER

## 2018-06-20 DIAGNOSIS — F41.9 ANXIETY: ICD-10-CM

## 2018-06-21 RX ORDER — LORAZEPAM 0.5 MG/1
TABLET ORAL
Qty: 120 TABLET | Refills: 0 | Status: SHIPPED | OUTPATIENT
Start: 2018-06-21 | End: 2018-08-14 | Stop reason: SDUPTHER

## 2018-06-29 ENCOUNTER — OFFICE VISIT (OUTPATIENT)
Dept: FAMILY MEDICINE CLINIC | Age: 83
End: 2018-06-29

## 2018-06-29 VITALS
BODY MASS INDEX: 19.93 KG/M2 | HEIGHT: 63 IN | HEART RATE: 68 BPM | RESPIRATION RATE: 14 BRPM | WEIGHT: 112.5 LBS | SYSTOLIC BLOOD PRESSURE: 112 MMHG | DIASTOLIC BLOOD PRESSURE: 66 MMHG

## 2018-06-29 DIAGNOSIS — K25.5 GASTRIC ULCER WITH PERFORATION, UNSPECIFIED CHRONICITY (HCC): ICD-10-CM

## 2018-06-29 DIAGNOSIS — K43.2 INCISIONAL HERNIA OF ANTERIOR ABDOMINAL WALL WITHOUT OBSTRUCTION OR GANGRENE: ICD-10-CM

## 2018-06-29 DIAGNOSIS — G89.29 CHRONIC MIDLINE LOW BACK PAIN WITHOUT SCIATICA: Primary | ICD-10-CM

## 2018-06-29 DIAGNOSIS — M54.50 CHRONIC MIDLINE LOW BACK PAIN WITHOUT SCIATICA: Primary | ICD-10-CM

## 2018-06-29 DIAGNOSIS — M15.9 PRIMARY OSTEOARTHRITIS INVOLVING MULTIPLE JOINTS: ICD-10-CM

## 2018-06-29 PROCEDURE — 99213 OFFICE O/P EST LOW 20 MIN: CPT | Performed by: FAMILY MEDICINE

## 2018-06-29 RX ORDER — CELECOXIB 200 MG/1
200 CAPSULE ORAL DAILY
Qty: 30 CAPSULE | Refills: 1 | Status: SHIPPED | OUTPATIENT
Start: 2018-06-29 | End: 2018-08-20

## 2018-06-29 ASSESSMENT — ENCOUNTER SYMPTOMS
SORE THROAT: 0
SHORTNESS OF BREATH: 0
CHEST TIGHTNESS: 0
ABDOMINAL PAIN: 0
NAUSEA: 0
COUGH: 0
CONSTIPATION: 0
WHEEZING: 0
EYE PAIN: 0

## 2018-07-09 DIAGNOSIS — G89.29 CHRONIC MIDLINE LOW BACK PAIN WITHOUT SCIATICA: ICD-10-CM

## 2018-07-09 DIAGNOSIS — S76.011A HIP STRAIN, RIGHT, INITIAL ENCOUNTER: ICD-10-CM

## 2018-07-09 DIAGNOSIS — M54.50 CHRONIC MIDLINE LOW BACK PAIN WITHOUT SCIATICA: ICD-10-CM

## 2018-07-09 DIAGNOSIS — M15.9 PRIMARY OSTEOARTHRITIS INVOLVING MULTIPLE JOINTS: ICD-10-CM

## 2018-07-10 RX ORDER — TRAMADOL HYDROCHLORIDE 50 MG/1
TABLET ORAL
Qty: 150 TABLET | Refills: 0 | Status: SHIPPED | OUTPATIENT
Start: 2018-07-10 | End: 2018-08-14 | Stop reason: SDUPTHER

## 2018-07-24 ENCOUNTER — HOSPITAL ENCOUNTER (OUTPATIENT)
Dept: GENERAL RADIOLOGY | Age: 83
Discharge: HOME | End: 2018-07-24
Payer: MEDICARE

## 2018-07-24 ENCOUNTER — HOSPITAL ENCOUNTER (OUTPATIENT)
Age: 83
Discharge: HOME | End: 2018-07-24
Payer: MEDICARE

## 2018-07-24 DIAGNOSIS — R10.84 GENERALIZED ABDOMINAL PAIN: ICD-10-CM

## 2018-07-24 DIAGNOSIS — K59.00 CONSTIPATION, UNSPECIFIED CONSTIPATION TYPE: ICD-10-CM

## 2018-07-24 PROCEDURE — 74018 RADEX ABDOMEN 1 VIEW: CPT

## 2018-08-14 DIAGNOSIS — S76.011A HIP STRAIN, RIGHT, INITIAL ENCOUNTER: ICD-10-CM

## 2018-08-14 DIAGNOSIS — M15.9 PRIMARY OSTEOARTHRITIS INVOLVING MULTIPLE JOINTS: ICD-10-CM

## 2018-08-14 DIAGNOSIS — F41.9 ANXIETY: ICD-10-CM

## 2018-08-14 DIAGNOSIS — G89.29 CHRONIC MIDLINE LOW BACK PAIN WITHOUT SCIATICA: ICD-10-CM

## 2018-08-14 DIAGNOSIS — M54.50 CHRONIC MIDLINE LOW BACK PAIN WITHOUT SCIATICA: ICD-10-CM

## 2018-08-15 RX ORDER — LORAZEPAM 0.5 MG/1
TABLET ORAL
Qty: 120 TABLET | Refills: 0 | Status: SHIPPED | OUTPATIENT
Start: 2018-08-15 | End: 2018-10-14 | Stop reason: SDUPTHER

## 2018-08-15 RX ORDER — TRAMADOL HYDROCHLORIDE 50 MG/1
TABLET ORAL
Qty: 150 TABLET | Refills: 0 | Status: SHIPPED | OUTPATIENT
Start: 2018-08-15 | End: 2018-09-18 | Stop reason: SDUPTHER

## 2018-08-20 ENCOUNTER — OFFICE VISIT (OUTPATIENT)
Dept: FAMILY MEDICINE CLINIC | Age: 83
End: 2018-08-20

## 2018-08-20 VITALS
BODY MASS INDEX: 19.84 KG/M2 | RESPIRATION RATE: 14 BRPM | SYSTOLIC BLOOD PRESSURE: 130 MMHG | HEIGHT: 63 IN | DIASTOLIC BLOOD PRESSURE: 70 MMHG | WEIGHT: 112 LBS | HEART RATE: 60 BPM

## 2018-08-20 DIAGNOSIS — Z12.39 BREAST SCREENING: ICD-10-CM

## 2018-08-20 DIAGNOSIS — M81.0 AGE-RELATED OSTEOPOROSIS WITHOUT CURRENT PATHOLOGICAL FRACTURE: ICD-10-CM

## 2018-08-20 DIAGNOSIS — M75.51 BURSITIS OF RIGHT SHOULDER: ICD-10-CM

## 2018-08-20 DIAGNOSIS — K21.9 GASTROESOPHAGEAL REFLUX DISEASE WITHOUT ESOPHAGITIS: Primary | ICD-10-CM

## 2018-08-20 DIAGNOSIS — F41.9 ANXIETY: ICD-10-CM

## 2018-08-20 DIAGNOSIS — E78.00 PURE HYPERCHOLESTEROLEMIA: ICD-10-CM

## 2018-08-20 DIAGNOSIS — G89.29 CHRONIC MIDLINE LOW BACK PAIN WITHOUT SCIATICA: ICD-10-CM

## 2018-08-20 DIAGNOSIS — M54.50 CHRONIC MIDLINE LOW BACK PAIN WITHOUT SCIATICA: ICD-10-CM

## 2018-08-20 PROCEDURE — 20605 DRAIN/INJ JOINT/BURSA W/O US: CPT | Performed by: FAMILY MEDICINE

## 2018-08-20 PROCEDURE — 1101F PT FALLS ASSESS-DOCD LE1/YR: CPT | Performed by: FAMILY MEDICINE

## 2018-08-20 PROCEDURE — 99213 OFFICE O/P EST LOW 20 MIN: CPT | Performed by: FAMILY MEDICINE

## 2018-08-20 ASSESSMENT — ENCOUNTER SYMPTOMS
WHEEZING: 0
COUGH: 0
SORE THROAT: 0
HOARSE VOICE: 0
GLOBUS SENSATION: 0
CHEST TIGHTNESS: 0
ABDOMINAL PAIN: 0
CONSTIPATION: 0
NAUSEA: 0
SHORTNESS OF BREATH: 0
EYE PAIN: 0

## 2018-08-20 NOTE — PROGRESS NOTES
ml of 1% lidocaine in a syringe, puncture was made into the joint freely. The mixture was introduced into the joint without resistance and the patient tolerated well the procedure. The patient was sent home stable and ambulatory after a 5 minutes observation in the office.          No results found for this visit on 08/20/18.      see in  3mths   Jem Craig MD

## 2018-08-24 DIAGNOSIS — E03.4 HYPOTHYROIDISM DUE TO ACQUIRED ATROPHY OF THYROID: ICD-10-CM

## 2018-08-25 RX ORDER — LEVOTHYROXINE SODIUM 0.05 MG/1
TABLET ORAL
Qty: 30 TABLET | Refills: 0 | Status: SHIPPED | OUTPATIENT
Start: 2018-08-25 | End: 2018-09-29 | Stop reason: SDUPTHER

## 2018-08-27 NOTE — TELEPHONE ENCOUNTER
Kelly Khalil informed by Phone.  Lab orders faxed to [facility] per request.

## 2018-08-29 ENCOUNTER — HOSPITAL ENCOUNTER (OUTPATIENT)
Age: 83
Discharge: HOME | End: 2018-08-29
Payer: MEDICARE

## 2018-08-29 DIAGNOSIS — E03.4 HYPOTHYROIDISM DUE TO ACQUIRED ATROPHY OF THYROID: ICD-10-CM

## 2018-08-29 LAB
T4 FREE: 1.21 NG/DL (ref 0.93–1.76)
TSH SERPL DL<=0.05 MIU/L-ACNC: 1.02 UIU/ML (ref 0.4–4.2)

## 2018-08-29 PROCEDURE — 84443 ASSAY THYROID STIM HORMONE: CPT

## 2018-08-29 PROCEDURE — 36415 COLL VENOUS BLD VENIPUNCTURE: CPT

## 2018-08-29 PROCEDURE — 84439 ASSAY OF FREE THYROXINE: CPT

## 2018-08-30 ENCOUNTER — TELEPHONE (OUTPATIENT)
Dept: FAMILY MEDICINE CLINIC | Age: 83
End: 2018-08-30

## 2018-09-10 DIAGNOSIS — N31.9 NEUROGENIC BLADDER: Primary | ICD-10-CM

## 2018-09-10 NOTE — TELEPHONE ENCOUNTER
Pt called requesting a Rx for Myrbetriq. Said she has not had it for a little while but is having trouble at  Night with urinating.     Edel Bustamante)

## 2018-09-18 DIAGNOSIS — S76.011A HIP STRAIN, RIGHT, INITIAL ENCOUNTER: ICD-10-CM

## 2018-09-18 DIAGNOSIS — M15.9 PRIMARY OSTEOARTHRITIS INVOLVING MULTIPLE JOINTS: ICD-10-CM

## 2018-09-18 DIAGNOSIS — M54.50 CHRONIC MIDLINE LOW BACK PAIN WITHOUT SCIATICA: ICD-10-CM

## 2018-09-18 DIAGNOSIS — G89.29 CHRONIC MIDLINE LOW BACK PAIN WITHOUT SCIATICA: ICD-10-CM

## 2018-09-19 NOTE — TELEPHONE ENCOUNTER
Date of last visit:  8/20/2018  Date of next visit:  11/20/2018    Requested Prescriptions     Pending Prescriptions Disp Refills    traMADol (ULTRAM) 50 MG tablet [Pharmacy Med Name: TRAMADOL HCL 50 MG TABLET] 150 tablet 0     Sig: take 1 to 2 tablets by mouth every 6 hours if needed for pain

## 2018-09-20 RX ORDER — TRAMADOL HYDROCHLORIDE 50 MG/1
TABLET ORAL
Qty: 150 TABLET | Refills: 0 | Status: SHIPPED | OUTPATIENT
Start: 2018-09-20 | End: 2018-10-20 | Stop reason: SDUPTHER

## 2018-09-28 ENCOUNTER — HOSPITAL ENCOUNTER (OUTPATIENT)
Dept: WOMENS IMAGING | Age: 83
Discharge: HOME | End: 2018-09-28
Payer: MEDICARE

## 2018-09-28 DIAGNOSIS — M81.0 AGE-RELATED OSTEOPOROSIS WITHOUT CURRENT PATHOLOGICAL FRACTURE: ICD-10-CM

## 2018-09-28 DIAGNOSIS — Z12.39 BREAST SCREENING: ICD-10-CM

## 2018-09-28 PROCEDURE — 77080 DXA BONE DENSITY AXIAL: CPT

## 2018-09-28 PROCEDURE — 77063 BREAST TOMOSYNTHESIS BI: CPT

## 2018-09-29 DIAGNOSIS — E03.4 HYPOTHYROIDISM DUE TO ACQUIRED ATROPHY OF THYROID: ICD-10-CM

## 2018-10-01 RX ORDER — LEVOTHYROXINE SODIUM 0.05 MG/1
TABLET ORAL
Qty: 30 TABLET | Refills: 0 | Status: SHIPPED | OUTPATIENT
Start: 2018-10-01 | End: 2018-11-02 | Stop reason: SDUPTHER

## 2018-10-02 ENCOUNTER — TELEPHONE (OUTPATIENT)
Dept: FAMILY MEDICINE CLINIC | Age: 83
End: 2018-10-02

## 2018-10-12 ENCOUNTER — HOSPITAL ENCOUNTER (OUTPATIENT)
Age: 83
Discharge: HOME | End: 2018-10-12
Payer: MEDICARE

## 2018-10-12 ENCOUNTER — HOSPITAL ENCOUNTER (OUTPATIENT)
Dept: GENERAL RADIOLOGY | Age: 83
Discharge: HOME | End: 2018-10-12
Payer: MEDICARE

## 2018-10-12 DIAGNOSIS — M25.511 RIGHT SHOULDER PAIN, UNSPECIFIED CHRONICITY: ICD-10-CM

## 2018-10-12 PROCEDURE — 73030 X-RAY EXAM OF SHOULDER: CPT

## 2018-10-14 DIAGNOSIS — F41.9 ANXIETY: ICD-10-CM

## 2018-10-16 RX ORDER — LORAZEPAM 0.5 MG/1
TABLET ORAL
Qty: 120 TABLET | Refills: 0 | Status: SHIPPED | OUTPATIENT
Start: 2018-10-16 | End: 2018-12-21 | Stop reason: SDUPTHER

## 2018-10-19 ENCOUNTER — NURSE ONLY (OUTPATIENT)
Dept: FAMILY MEDICINE CLINIC | Age: 83
End: 2018-10-19

## 2018-10-19 DIAGNOSIS — Z23 NEED FOR IMMUNIZATION AGAINST INFLUENZA: Primary | ICD-10-CM

## 2018-10-19 PROCEDURE — 90688 IIV4 VACCINE SPLT 0.5 ML IM: CPT | Performed by: FAMILY MEDICINE

## 2018-10-19 PROCEDURE — G0008 ADMIN INFLUENZA VIRUS VAC: HCPCS | Performed by: FAMILY MEDICINE

## 2018-10-20 DIAGNOSIS — G89.29 CHRONIC MIDLINE LOW BACK PAIN WITHOUT SCIATICA: ICD-10-CM

## 2018-10-20 DIAGNOSIS — M54.50 CHRONIC MIDLINE LOW BACK PAIN WITHOUT SCIATICA: ICD-10-CM

## 2018-10-20 DIAGNOSIS — M15.9 PRIMARY OSTEOARTHRITIS INVOLVING MULTIPLE JOINTS: ICD-10-CM

## 2018-10-20 DIAGNOSIS — S76.011A HIP STRAIN, RIGHT, INITIAL ENCOUNTER: ICD-10-CM

## 2018-10-23 ENCOUNTER — TELEPHONE (OUTPATIENT)
Dept: FAMILY MEDICINE CLINIC | Age: 83
End: 2018-10-23

## 2018-10-23 RX ORDER — PANTOPRAZOLE SODIUM 40 MG/1
TABLET, DELAYED RELEASE ORAL
Qty: 60 TABLET | Refills: 1 | Status: SHIPPED | OUTPATIENT
Start: 2018-10-23 | End: 2018-11-20 | Stop reason: SDUPTHER

## 2018-10-23 RX ORDER — TRAMADOL HYDROCHLORIDE 50 MG/1
TABLET ORAL
Qty: 150 TABLET | Refills: 0 | Status: SHIPPED | OUTPATIENT
Start: 2018-10-23 | End: 2018-11-21 | Stop reason: SDUPTHER

## 2018-10-23 NOTE — TELEPHONE ENCOUNTER
Pt called requesting a 30 day supply of Protonix 40 mg one tablet twice a day. Pt is completely out.     Edel Bustamante)

## 2018-11-02 DIAGNOSIS — E03.4 HYPOTHYROIDISM DUE TO ACQUIRED ATROPHY OF THYROID: ICD-10-CM

## 2018-11-03 RX ORDER — LEVOTHYROXINE SODIUM 0.05 MG/1
TABLET ORAL
Qty: 90 TABLET | Refills: 1 | Status: SHIPPED | OUTPATIENT
Start: 2018-11-03 | End: 2019-04-17 | Stop reason: SDUPTHER

## 2018-11-20 DIAGNOSIS — S76.011A HIP STRAIN, RIGHT, INITIAL ENCOUNTER: ICD-10-CM

## 2018-11-20 DIAGNOSIS — M54.50 CHRONIC MIDLINE LOW BACK PAIN WITHOUT SCIATICA: Primary | ICD-10-CM

## 2018-11-20 DIAGNOSIS — G89.29 CHRONIC MIDLINE LOW BACK PAIN WITHOUT SCIATICA: Primary | ICD-10-CM

## 2018-11-20 DIAGNOSIS — M15.9 PRIMARY OSTEOARTHRITIS INVOLVING MULTIPLE JOINTS: ICD-10-CM

## 2018-11-20 RX ORDER — TRAMADOL HYDROCHLORIDE 50 MG/1
TABLET ORAL
Qty: 150 TABLET | Refills: 0 | Status: CANCELLED | OUTPATIENT
Start: 2018-11-20 | End: 2018-12-20

## 2018-11-21 RX ORDER — PANTOPRAZOLE SODIUM 40 MG/1
TABLET, DELAYED RELEASE ORAL
Qty: 60 TABLET | Refills: 1 | Status: SHIPPED | OUTPATIENT
Start: 2018-11-21 | End: 2019-02-25 | Stop reason: SDUPTHER

## 2018-11-21 RX ORDER — TRAMADOL HYDROCHLORIDE 50 MG/1
50 TABLET ORAL EVERY 4 HOURS PRN
Qty: 150 TABLET | Refills: 0 | Status: SHIPPED | OUTPATIENT
Start: 2018-11-21 | End: 2019-01-02 | Stop reason: SDUPTHER

## 2018-11-25 DIAGNOSIS — F41.9 ANXIETY: ICD-10-CM

## 2018-11-27 RX ORDER — CITALOPRAM 40 MG/1
TABLET ORAL
Qty: 45 TABLET | Refills: 1 | Status: SHIPPED | OUTPATIENT
Start: 2018-11-27 | End: 2019-05-07 | Stop reason: SDUPTHER

## 2018-11-28 ENCOUNTER — OFFICE VISIT (OUTPATIENT)
Dept: FAMILY MEDICINE CLINIC | Age: 83
End: 2018-11-28

## 2018-11-28 VITALS
SYSTOLIC BLOOD PRESSURE: 112 MMHG | BODY MASS INDEX: 20.09 KG/M2 | RESPIRATION RATE: 12 BRPM | DIASTOLIC BLOOD PRESSURE: 60 MMHG | HEIGHT: 63 IN | WEIGHT: 113.38 LBS | HEART RATE: 76 BPM

## 2018-11-28 DIAGNOSIS — M54.50 CHRONIC MIDLINE LOW BACK PAIN WITHOUT SCIATICA: ICD-10-CM

## 2018-11-28 DIAGNOSIS — K21.9 GASTROESOPHAGEAL REFLUX DISEASE WITHOUT ESOPHAGITIS: ICD-10-CM

## 2018-11-28 DIAGNOSIS — F41.9 ANXIETY: ICD-10-CM

## 2018-11-28 DIAGNOSIS — E03.4 HYPOTHYROIDISM DUE TO ACQUIRED ATROPHY OF THYROID: ICD-10-CM

## 2018-11-28 DIAGNOSIS — E78.00 PURE HYPERCHOLESTEROLEMIA: ICD-10-CM

## 2018-11-28 DIAGNOSIS — G89.29 CHRONIC MIDLINE LOW BACK PAIN WITHOUT SCIATICA: ICD-10-CM

## 2018-11-28 DIAGNOSIS — I10 ESSENTIAL HYPERTENSION: Primary | ICD-10-CM

## 2018-11-28 DIAGNOSIS — N31.9 NEUROGENIC BLADDER: ICD-10-CM

## 2018-11-28 PROBLEM — E87.6 HYPOKALEMIA: Status: RESOLVED | Noted: 2017-12-03 | Resolved: 2018-11-28

## 2018-11-28 PROCEDURE — 99213 OFFICE O/P EST LOW 20 MIN: CPT | Performed by: FAMILY MEDICINE

## 2018-11-28 PROCEDURE — 1101F PT FALLS ASSESS-DOCD LE1/YR: CPT | Performed by: FAMILY MEDICINE

## 2018-11-28 ASSESSMENT — ENCOUNTER SYMPTOMS
COUGH: 0
SHORTNESS OF BREATH: 0
HOARSE VOICE: 0
SORE THROAT: 0
ABDOMINAL PAIN: 0
NAUSEA: 0
ORTHOPNEA: 0
EYE PAIN: 0
WHEEZING: 0
CHEST TIGHTNESS: 0
HEARTBURN: 0
CONSTIPATION: 0

## 2018-11-28 ASSESSMENT — PATIENT HEALTH QUESTIONNAIRE - PHQ9
1. LITTLE INTEREST OR PLEASURE IN DOING THINGS: 0
SUM OF ALL RESPONSES TO PHQ QUESTIONS 1-9: 0
SUM OF ALL RESPONSES TO PHQ QUESTIONS 1-9: 0
2. FEELING DOWN, DEPRESSED OR HOPELESS: 0
SUM OF ALL RESPONSES TO PHQ9 QUESTIONS 1 & 2: 0

## 2018-12-09 DIAGNOSIS — G60.9 IDIOPATHIC PERIPHERAL NEUROPATHY: ICD-10-CM

## 2018-12-10 NOTE — TELEPHONE ENCOUNTER
The pharmacy is  requesting a refill of the below medication which has been pended for you:     Requested Prescriptions     Pending Prescriptions Disp Refills    gabapentin (NEURONTIN) 100 MG capsule [Pharmacy Med Name: GABAPENTIN 100 MG CAPSULE] 270 capsule 1     Sig: take 3 capsule by mouth at bedtime       Last Appointment Date: 11/28/2018  Next Appointment Date: 2/28/2019    Allergies   Allergen Reactions    Amoxicillin-Pot Clavulanate Diarrhea    Statins Support Therapy      Unaware of allery    Sulfa Antibiotics Nausea Only

## 2018-12-12 RX ORDER — GABAPENTIN 100 MG/1
CAPSULE ORAL
Qty: 270 CAPSULE | Refills: 1 | Status: SHIPPED | OUTPATIENT
Start: 2018-12-12 | End: 2019-06-08 | Stop reason: SDUPTHER

## 2018-12-14 ENCOUNTER — OFFICE VISIT (OUTPATIENT)
Dept: FAMILY MEDICINE CLINIC | Age: 83
End: 2018-12-14

## 2018-12-14 VITALS
HEART RATE: 60 BPM | WEIGHT: 114 LBS | BODY MASS INDEX: 20.2 KG/M2 | SYSTOLIC BLOOD PRESSURE: 112 MMHG | DIASTOLIC BLOOD PRESSURE: 66 MMHG | HEIGHT: 63 IN | RESPIRATION RATE: 14 BRPM

## 2018-12-14 DIAGNOSIS — K43.2 INCISIONAL HERNIA OF ANTERIOR ABDOMINAL WALL WITHOUT OBSTRUCTION OR GANGRENE: ICD-10-CM

## 2018-12-14 DIAGNOSIS — K21.9 GASTROESOPHAGEAL REFLUX DISEASE WITHOUT ESOPHAGITIS: ICD-10-CM

## 2018-12-14 DIAGNOSIS — M75.52 ACUTE BURSITIS OF LEFT SHOULDER: Primary | ICD-10-CM

## 2018-12-14 PROCEDURE — 1101F PT FALLS ASSESS-DOCD LE1/YR: CPT | Performed by: FAMILY MEDICINE

## 2018-12-14 PROCEDURE — 99213 OFFICE O/P EST LOW 20 MIN: CPT | Performed by: FAMILY MEDICINE

## 2018-12-14 RX ORDER — PREDNISONE 20 MG/1
TABLET ORAL
Qty: 15 TABLET | Refills: 0 | Status: SHIPPED | OUTPATIENT
Start: 2018-12-14 | End: 2019-02-28 | Stop reason: ALTCHOICE

## 2018-12-14 ASSESSMENT — ENCOUNTER SYMPTOMS
SORE THROAT: 0
NAUSEA: 0
CHEST TIGHTNESS: 0
COUGH: 0
EYE PAIN: 0
SHORTNESS OF BREATH: 0
ABDOMINAL PAIN: 0
WHEEZING: 0
CONSTIPATION: 0

## 2018-12-21 DIAGNOSIS — F41.9 ANXIETY: ICD-10-CM

## 2018-12-22 RX ORDER — PANTOPRAZOLE SODIUM 40 MG/1
TABLET, DELAYED RELEASE ORAL
Qty: 60 TABLET | Refills: 1 | Status: SHIPPED | OUTPATIENT
Start: 2018-12-22 | End: 2019-02-28 | Stop reason: SDUPTHER

## 2018-12-22 RX ORDER — LORAZEPAM 0.5 MG/1
TABLET ORAL
Qty: 120 TABLET | Refills: 0 | Status: SHIPPED | OUTPATIENT
Start: 2018-12-22 | End: 2019-02-12 | Stop reason: SDUPTHER

## 2019-01-02 DIAGNOSIS — S76.011A HIP STRAIN, RIGHT, INITIAL ENCOUNTER: ICD-10-CM

## 2019-01-02 DIAGNOSIS — G89.29 CHRONIC MIDLINE LOW BACK PAIN WITHOUT SCIATICA: ICD-10-CM

## 2019-01-02 DIAGNOSIS — M15.9 PRIMARY OSTEOARTHRITIS INVOLVING MULTIPLE JOINTS: ICD-10-CM

## 2019-01-02 DIAGNOSIS — M54.50 CHRONIC MIDLINE LOW BACK PAIN WITHOUT SCIATICA: ICD-10-CM

## 2019-01-04 RX ORDER — TRAMADOL HYDROCHLORIDE 50 MG/1
50 TABLET ORAL EVERY 4 HOURS PRN
Qty: 150 TABLET | Refills: 0 | Status: SHIPPED | OUTPATIENT
Start: 2019-01-04 | End: 2019-02-12 | Stop reason: SDUPTHER

## 2019-02-12 DIAGNOSIS — F41.9 ANXIETY: ICD-10-CM

## 2019-02-12 DIAGNOSIS — S76.011A HIP STRAIN, RIGHT, INITIAL ENCOUNTER: ICD-10-CM

## 2019-02-12 DIAGNOSIS — M15.9 PRIMARY OSTEOARTHRITIS INVOLVING MULTIPLE JOINTS: ICD-10-CM

## 2019-02-12 DIAGNOSIS — G89.29 CHRONIC MIDLINE LOW BACK PAIN WITHOUT SCIATICA: ICD-10-CM

## 2019-02-12 DIAGNOSIS — M54.50 CHRONIC MIDLINE LOW BACK PAIN WITHOUT SCIATICA: ICD-10-CM

## 2019-02-13 RX ORDER — TRAMADOL HYDROCHLORIDE 50 MG/1
50 TABLET ORAL EVERY 4 HOURS PRN
Qty: 150 TABLET | Refills: 0 | Status: SHIPPED | OUTPATIENT
Start: 2019-02-13 | End: 2019-04-09 | Stop reason: SDUPTHER

## 2019-02-13 RX ORDER — LORAZEPAM 0.5 MG/1
TABLET ORAL
Qty: 120 TABLET | Refills: 0 | Status: SHIPPED | OUTPATIENT
Start: 2019-02-13 | End: 2019-04-17 | Stop reason: SDUPTHER

## 2019-02-26 RX ORDER — PANTOPRAZOLE SODIUM 40 MG/1
TABLET, DELAYED RELEASE ORAL
Qty: 180 TABLET | Refills: 1 | Status: SHIPPED | OUTPATIENT
Start: 2019-02-26 | End: 2019-03-30 | Stop reason: SDUPTHER

## 2019-02-28 ENCOUNTER — OFFICE VISIT (OUTPATIENT)
Dept: FAMILY MEDICINE CLINIC | Age: 84
End: 2019-02-28

## 2019-02-28 VITALS
SYSTOLIC BLOOD PRESSURE: 124 MMHG | BODY MASS INDEX: 20.91 KG/M2 | WEIGHT: 118 LBS | HEART RATE: 72 BPM | HEIGHT: 63 IN | RESPIRATION RATE: 14 BRPM | DIASTOLIC BLOOD PRESSURE: 74 MMHG

## 2019-02-28 DIAGNOSIS — G60.9 IDIOPATHIC PERIPHERAL NEUROPATHY: ICD-10-CM

## 2019-02-28 DIAGNOSIS — K21.9 GASTROESOPHAGEAL REFLUX DISEASE WITHOUT ESOPHAGITIS: Primary | ICD-10-CM

## 2019-02-28 DIAGNOSIS — E78.00 PURE HYPERCHOLESTEROLEMIA: ICD-10-CM

## 2019-02-28 DIAGNOSIS — M15.9 PRIMARY OSTEOARTHRITIS INVOLVING MULTIPLE JOINTS: ICD-10-CM

## 2019-02-28 DIAGNOSIS — N31.9 NEUROGENIC BLADDER: ICD-10-CM

## 2019-02-28 DIAGNOSIS — E03.4 HYPOTHYROIDISM DUE TO ACQUIRED ATROPHY OF THYROID: ICD-10-CM

## 2019-02-28 PROCEDURE — 1101F PT FALLS ASSESS-DOCD LE1/YR: CPT | Performed by: FAMILY MEDICINE

## 2019-02-28 PROCEDURE — 1036F TOBACCO NON-USER: CPT | Performed by: FAMILY MEDICINE

## 2019-02-28 PROCEDURE — 1090F PRES/ABSN URINE INCON ASSESS: CPT | Performed by: FAMILY MEDICINE

## 2019-02-28 PROCEDURE — G8420 CALC BMI NORM PARAMETERS: HCPCS | Performed by: FAMILY MEDICINE

## 2019-02-28 PROCEDURE — 99213 OFFICE O/P EST LOW 20 MIN: CPT | Performed by: FAMILY MEDICINE

## 2019-02-28 PROCEDURE — 4040F PNEUMOC VAC/ADMIN/RCVD: CPT | Performed by: FAMILY MEDICINE

## 2019-02-28 PROCEDURE — 1123F ACP DISCUSS/DSCN MKR DOCD: CPT | Performed by: FAMILY MEDICINE

## 2019-02-28 PROCEDURE — G8427 DOCREV CUR MEDS BY ELIG CLIN: HCPCS | Performed by: FAMILY MEDICINE

## 2019-02-28 RX ORDER — METHYLPREDNISOLONE ACETATE 40 MG/ML
40 INJECTION, SUSPENSION INTRA-ARTICULAR; INTRALESIONAL; INTRAMUSCULAR; SOFT TISSUE ONCE
Status: COMPLETED | OUTPATIENT
Start: 2019-02-28 | End: 2019-02-28

## 2019-02-28 RX ORDER — HYDROXYCHLOROQUINE SULFATE 200 MG/1
200 TABLET, FILM COATED ORAL DAILY
Refills: 0 | COMMUNITY
Start: 2019-01-30 | End: 2019-09-18

## 2019-02-28 RX ORDER — METHYLPREDNISOLONE ACETATE 80 MG/ML
80 INJECTION, SUSPENSION INTRA-ARTICULAR; INTRALESIONAL; INTRAMUSCULAR; SOFT TISSUE ONCE
Status: COMPLETED | OUTPATIENT
Start: 2019-02-28 | End: 2019-02-28

## 2019-02-28 RX ADMIN — METHYLPREDNISOLONE ACETATE 80 MG: 80 INJECTION, SUSPENSION INTRA-ARTICULAR; INTRALESIONAL; INTRAMUSCULAR; SOFT TISSUE at 12:26

## 2019-02-28 RX ADMIN — METHYLPREDNISOLONE ACETATE 40 MG: 40 INJECTION, SUSPENSION INTRA-ARTICULAR; INTRALESIONAL; INTRAMUSCULAR; SOFT TISSUE at 12:25

## 2019-02-28 ASSESSMENT — ENCOUNTER SYMPTOMS
HEARTBURN: 0
CHEST TIGHTNESS: 0
SHORTNESS OF BREATH: 0
SORE THROAT: 0
COUGH: 0
ABDOMINAL PAIN: 0
ORTHOPNEA: 0
HOARSE VOICE: 0
WHEEZING: 0
NAUSEA: 0
EYE PAIN: 0
CONSTIPATION: 0

## 2019-03-18 ENCOUNTER — TELEPHONE (OUTPATIENT)
Dept: FAMILY MEDICINE CLINIC | Age: 84
End: 2019-03-18

## 2019-03-18 DIAGNOSIS — M75.51 BURSITIS OF RIGHT SHOULDER: Primary | ICD-10-CM

## 2019-03-30 RX ORDER — PANTOPRAZOLE SODIUM 40 MG/1
TABLET, DELAYED RELEASE ORAL
Qty: 180 TABLET | Refills: 1 | Status: SHIPPED | OUTPATIENT
Start: 2019-03-30 | End: 2020-04-24 | Stop reason: SDUPTHER

## 2019-04-08 DIAGNOSIS — G89.29 CHRONIC MIDLINE LOW BACK PAIN WITHOUT SCIATICA: ICD-10-CM

## 2019-04-08 DIAGNOSIS — M15.9 PRIMARY OSTEOARTHRITIS INVOLVING MULTIPLE JOINTS: ICD-10-CM

## 2019-04-08 DIAGNOSIS — M54.50 CHRONIC MIDLINE LOW BACK PAIN WITHOUT SCIATICA: ICD-10-CM

## 2019-04-08 DIAGNOSIS — S76.011A HIP STRAIN, RIGHT, INITIAL ENCOUNTER: ICD-10-CM

## 2019-04-09 RX ORDER — TRAMADOL HYDROCHLORIDE 50 MG/1
50 TABLET ORAL EVERY 4 HOURS PRN
Qty: 150 TABLET | Refills: 0 | Status: SHIPPED | OUTPATIENT
Start: 2019-04-09 | End: 2019-05-31 | Stop reason: SDUPTHER

## 2019-04-17 ENCOUNTER — OFFICE VISIT (OUTPATIENT)
Dept: FAMILY MEDICINE CLINIC | Age: 84
End: 2019-04-17

## 2019-04-17 VITALS
SYSTOLIC BLOOD PRESSURE: 110 MMHG | RESPIRATION RATE: 13 BRPM | BODY MASS INDEX: 20.93 KG/M2 | HEART RATE: 74 BPM | HEIGHT: 63 IN | DIASTOLIC BLOOD PRESSURE: 64 MMHG | WEIGHT: 118.13 LBS

## 2019-04-17 DIAGNOSIS — M75.51 BURSITIS OF RIGHT SHOULDER: ICD-10-CM

## 2019-04-17 DIAGNOSIS — Z87.11 HISTORY OF GASTRIC ULCER: ICD-10-CM

## 2019-04-17 DIAGNOSIS — E03.4 HYPOTHYROIDISM DUE TO ACQUIRED ATROPHY OF THYROID: ICD-10-CM

## 2019-04-17 DIAGNOSIS — M15.9 PRIMARY OSTEOARTHRITIS INVOLVING MULTIPLE JOINTS: ICD-10-CM

## 2019-04-17 DIAGNOSIS — E78.00 PURE HYPERCHOLESTEROLEMIA: ICD-10-CM

## 2019-04-17 DIAGNOSIS — I10 ESSENTIAL HYPERTENSION: Primary | ICD-10-CM

## 2019-04-17 DIAGNOSIS — F41.9 ANXIETY: ICD-10-CM

## 2019-04-17 PROCEDURE — 4040F PNEUMOC VAC/ADMIN/RCVD: CPT | Performed by: FAMILY MEDICINE

## 2019-04-17 PROCEDURE — 1090F PRES/ABSN URINE INCON ASSESS: CPT | Performed by: FAMILY MEDICINE

## 2019-04-17 PROCEDURE — 1123F ACP DISCUSS/DSCN MKR DOCD: CPT | Performed by: FAMILY MEDICINE

## 2019-04-17 PROCEDURE — G8420 CALC BMI NORM PARAMETERS: HCPCS | Performed by: FAMILY MEDICINE

## 2019-04-17 PROCEDURE — G8427 DOCREV CUR MEDS BY ELIG CLIN: HCPCS | Performed by: FAMILY MEDICINE

## 2019-04-17 PROCEDURE — 99213 OFFICE O/P EST LOW 20 MIN: CPT | Performed by: FAMILY MEDICINE

## 2019-04-17 PROCEDURE — 1036F TOBACCO NON-USER: CPT | Performed by: FAMILY MEDICINE

## 2019-04-17 RX ORDER — LEVOTHYROXINE SODIUM 0.05 MG/1
TABLET ORAL
Qty: 90 TABLET | Refills: 1 | Status: SHIPPED | OUTPATIENT
Start: 2019-04-17 | End: 2019-07-18 | Stop reason: SDUPTHER

## 2019-04-17 RX ORDER — LORAZEPAM 0.5 MG/1
TABLET ORAL
Qty: 120 TABLET | Refills: 0 | Status: SHIPPED | OUTPATIENT
Start: 2019-04-17 | End: 2019-06-21 | Stop reason: SDUPTHER

## 2019-04-17 ASSESSMENT — ENCOUNTER SYMPTOMS
COUGH: 0
CONSTIPATION: 0
EYE PAIN: 0
NAUSEA: 0
WHEEZING: 0
CHEST TIGHTNESS: 0
SORE THROAT: 0
ABDOMINAL PAIN: 0
SHORTNESS OF BREATH: 0

## 2019-04-17 NOTE — PROGRESS NOTES
malaise/fatigue. HENT: Negative for congestion, ear pain, postnasal drip and sore throat. Eyes: Negative for pain and visual disturbance. Respiratory: Negative for cough, chest tightness, shortness of breath and wheezing. Cardiovascular: Negative for chest pain, palpitations and leg swelling. Gastrointestinal: Negative for abdominal pain, constipation and nausea. Genitourinary: Negative for dysuria and frequency. Musculoskeletal: Negative for arthralgias, joint swelling, neck pain and neck stiffness. Shoulder and  And  Hip and  Back pain    Skin: Negative for rash. Neurological: Negative for dizziness, weakness, numbness and headaches. Hematological: Negative for adenopathy. Does not bruise/bleed easily. Psychiatric/Behavioral: Negative for behavioral problems and sleep disturbance. The patient is not nervous/anxious. /64 (Site: Left Upper Arm, Position: Sitting, Cuff Size: Medium Adult)   Pulse 74   Resp 13   Ht 5' 3\" (1.6 m)   Wt 118 lb 2 oz (53.6 kg)   BMI 20.92 kg/m²   Objective:   Physical Exam   Constitutional: She is oriented to person, place, and time. She appears well-developed and well-nourished. HENT:   Head: Normocephalic and atraumatic. Right Ear: External ear normal.   Left Ear: External ear normal.   Nose: Nose normal.   Mouth/Throat: Oropharynx is clear and moist.   Eyes: Pupils are equal, round, and reactive to light. Conjunctivae and EOM are normal. No scleral icterus. Neck: Normal range of motion. Neck supple. No JVD present. No thyromegaly present. Cardiovascular: Normal rate, regular rhythm, normal heart sounds and intact distal pulses. Pulmonary/Chest: Effort normal and breath sounds normal. She has no wheezes. She has no rales. Abdominal: Soft. Bowel sounds are normal. She exhibits no distension and no mass. There is no tenderness. Musculoskeletal: Normal range of motion. She exhibits no tenderness.      Right    Shoulder pain Lymphadenopathy:     She has no cervical adenopathy. Neurological: She is alert and oriented to person, place, and time. She has normal reflexes. No cranial nerve deficit. Skin: Skin is warm and dry. No rash noted. Psychiatric: She has a normal mood and affect. Nursing note and vitals reviewed. Assessment:       Diagnosis Orders   1. Essential hypertension     2. Hypothyroidism due to acquired atrophy of thyroid  levothyroxine (SYNTHROID) 50 MCG tablet   3. Anxiety  LORazepam (ATIVAN) 0.5 MG tablet   4. Primary osteoarthritis involving multiple joints     5. Bursitis of right shoulder     6. Pure hypercholesterolemia     7. History of gastric ulcer           Plan:       Current Outpatient Medications   Medication Sig Dispense Refill    levothyroxine (SYNTHROID) 50 MCG tablet take 1 tablet by mouth once daily 90 tablet 1    LORazepam (ATIVAN) 0.5 MG tablet take 1 tablet by mouth every 6 hours if needed for anxiety 120 tablet 0    traMADol (ULTRAM) 50 MG tablet Take 1 tablet by mouth every 4 hours as needed for Pain for up to 30 days. 150 tablet 0    pantoprazole (PROTONIX) 40 MG tablet take 1 tablet by mouth twice daily 30 MINUTES PRIOR TO AM MEAL AND SUPPER 180 tablet 1    hydroxychloroquine (PLAQUENIL) 200 MG tablet Take 200 mg by mouth daily   0    Mirabegron ER (MYRBETRIQ) 50 MG TB24 Take 50 mg by mouth daily 90 tablet 1    gabapentin (NEURONTIN) 100 MG capsule take 3 capsule by mouth at bedtime 270 capsule 1    citalopram (CELEXA) 40 MG tablet take 1/2 tablet by mouth once daily 45 tablet 1    Multiple Vitamins-Minerals (PRESERVISION/LUTEIN) CAPS Take 1 capsule by mouth 2 times daily. No current facility-administered medications for this visit.           Orders Placed This Encounter   Procedures   Boo Ng MD, Pain Medicine, [facility]     Referral Priority:   Routine     Referral Type:   Eval and Treat     Referral Reason:   Specialty Services Required Referred to Provider:   Silviano Brady MD     Requested Specialty:   Pain Medicine     Number of Visits Requested:   1     No results found for this visit on 04/17/19. At this point time has increased placable some help.  On tramadol and needs to finish that around May 9 May added Norco but we'll send to pain referral and pain management for  evaluation   Argelia Mcgraw MD

## 2019-05-07 DIAGNOSIS — F41.9 ANXIETY: ICD-10-CM

## 2019-05-08 RX ORDER — CITALOPRAM 40 MG/1
TABLET ORAL
Qty: 45 TABLET | Refills: 1 | Status: SHIPPED | OUTPATIENT
Start: 2019-05-08 | End: 2019-10-31 | Stop reason: SDUPTHER

## 2019-05-13 DIAGNOSIS — G89.29 CHRONIC PAIN OF BOTH SHOULDERS: ICD-10-CM

## 2019-05-13 DIAGNOSIS — M15.9 PRIMARY OSTEOARTHRITIS INVOLVING MULTIPLE JOINTS: Primary | ICD-10-CM

## 2019-05-13 DIAGNOSIS — M25.512 CHRONIC PAIN OF BOTH SHOULDERS: ICD-10-CM

## 2019-05-13 DIAGNOSIS — M25.511 CHRONIC PAIN OF BOTH SHOULDERS: ICD-10-CM

## 2019-05-13 NOTE — TELEPHONE ENCOUNTER
Patient called stating that she has finished her Tramadol and Dr Mani Addison said he would prescribe Norco now. Uses [facility] on [address].  Next appt 7/17/19.

## 2019-05-14 RX ORDER — HYDROCODONE BITARTRATE AND ACETAMINOPHEN 5; 325 MG/1; MG/1
1 TABLET ORAL EVERY 4 HOURS PRN
Qty: 42 TABLET | Refills: 0 | Status: SHIPPED | OUTPATIENT
Start: 2019-05-14 | End: 2019-05-21

## 2019-05-14 NOTE — TELEPHONE ENCOUNTER
Has appointment  With pain management  And not sure of the date and give norco one 3 to 4 timea a day for one week to try for pain relief .  Inform called in 43 as one every 4 hours but try to use just 3 times a day only  And appointment with pain management needs to keep with dr Martinez Pay      also with norco due to sedation inform to decrease ativan to 2 a day

## 2019-05-30 ENCOUNTER — TELEPHONE (OUTPATIENT)
Dept: FAMILY MEDICINE CLINIC | Age: 84
End: 2019-05-30

## 2019-05-30 DIAGNOSIS — M54.50 CHRONIC MIDLINE LOW BACK PAIN WITHOUT SCIATICA: ICD-10-CM

## 2019-05-30 DIAGNOSIS — G89.29 CHRONIC MIDLINE LOW BACK PAIN WITHOUT SCIATICA: ICD-10-CM

## 2019-05-30 DIAGNOSIS — M15.9 PRIMARY OSTEOARTHRITIS INVOLVING MULTIPLE JOINTS: ICD-10-CM

## 2019-05-30 DIAGNOSIS — S76.011A HIP STRAIN, RIGHT, INITIAL ENCOUNTER: ICD-10-CM

## 2019-05-30 NOTE — TELEPHONE ENCOUNTER
Pt called stating Brenda Wills, even cut in half is causing her dizziness and upset stomach. Pt ask if she can go back to RX Tramadol for her shoulder pain?     [address]

## 2019-05-31 RX ORDER — TRAMADOL HYDROCHLORIDE 50 MG/1
50 TABLET ORAL EVERY 4 HOURS PRN
Qty: 150 TABLET | Refills: 0 | Status: SHIPPED | OUTPATIENT
Start: 2019-05-31 | End: 2019-06-27 | Stop reason: SDUPTHER

## 2019-06-05 ENCOUNTER — OFFICE VISIT (OUTPATIENT)
Dept: PHYSICAL MEDICINE AND REHAB | Age: 84
End: 2019-06-05
Payer: MEDICARE

## 2019-06-05 VITALS
WEIGHT: 118 LBS | SYSTOLIC BLOOD PRESSURE: 116 MMHG | DIASTOLIC BLOOD PRESSURE: 63 MMHG | HEART RATE: 71 BPM | BODY MASS INDEX: 20.91 KG/M2 | HEIGHT: 63 IN

## 2019-06-05 DIAGNOSIS — G89.4 CHRONIC PAIN SYNDROME: ICD-10-CM

## 2019-06-05 DIAGNOSIS — M19.011 PRIMARY OSTEOARTHRITIS OF RIGHT SHOULDER: Primary | ICD-10-CM

## 2019-06-05 DIAGNOSIS — M19.041 PRIMARY OSTEOARTHRITIS OF BOTH HANDS: ICD-10-CM

## 2019-06-05 DIAGNOSIS — M19.042 PRIMARY OSTEOARTHRITIS OF BOTH HANDS: ICD-10-CM

## 2019-06-05 PROCEDURE — G8420 CALC BMI NORM PARAMETERS: HCPCS | Performed by: PAIN MEDICINE

## 2019-06-05 PROCEDURE — G8427 DOCREV CUR MEDS BY ELIG CLIN: HCPCS | Performed by: PAIN MEDICINE

## 2019-06-05 PROCEDURE — 1036F TOBACCO NON-USER: CPT | Performed by: PAIN MEDICINE

## 2019-06-05 PROCEDURE — 1123F ACP DISCUSS/DSCN MKR DOCD: CPT | Performed by: PAIN MEDICINE

## 2019-06-05 PROCEDURE — 99205 OFFICE O/P NEW HI 60 MIN: CPT | Performed by: PAIN MEDICINE

## 2019-06-05 PROCEDURE — 4040F PNEUMOC VAC/ADMIN/RCVD: CPT | Performed by: PAIN MEDICINE

## 2019-06-05 PROCEDURE — 1090F PRES/ABSN URINE INCON ASSESS: CPT | Performed by: PAIN MEDICINE

## 2019-06-05 ASSESSMENT — ENCOUNTER SYMPTOMS
SHORTNESS OF BREATH: 0
RHINORRHEA: 0
PHOTOPHOBIA: 0
NAUSEA: 0
COUGH: 0
DIARRHEA: 0
VOMITING: 0
CONSTIPATION: 1
EYE PAIN: 0
CHEST TIGHTNESS: 0
ABDOMINAL PAIN: 0
BACK PAIN: 1
WHEEZING: 0
SORE THROAT: 0
SINUS PRESSURE: 0
COLOR CHANGE: 0

## 2019-06-05 NOTE — PROGRESS NOTES
[address] [facility]  [address] [facility]  Dept: [phone]  Dept Fax: [phone]  Loc: [phone]    Visit Date: 6/5/2019    Angel Kiran is a 80 y.o. female who is referred for pain management evaluation and treatment per Dr. Lata Cochran. [facility] Questions   1. In the last three months, have you felt you should cut down or stop drinking or using drugs? Yes []        No [x]     2. In the last three months, has anyone annoyed you or gotten on your nerves by telling you to cut down or stop drinking or using drugs? Yes []        No [x]     3. In the last three months, have you felt guilty or bad about how much you drink or use drugs? Yes []        No [x]     4. In the last three months, have you been waking up wanting to have an alcoholic drink or use drugs? Yes []        No [x]        Opioid Risk Tool:  Clinician Form       1. Family History of Substance Abuse: Female Male    Alcohol   [x]1   [x]3    Illegal drugs   []2   []3    Prescription drugs     []4   []4   2. Personal History of Substance Abuse:          Alcohol   []3   []3    Illegal drugs   []4   []4    Prescription drugs     []5   []5   3. Age (quirino box if between 12 and 39):     []1   []1   4. History of Preadolescent Sexual Abuse:     []3   []0   5. Psychological Disease:      Attention deficit disorder, obsessive-compulsive disorder, bipolar, schizophrenia   []2   []2      Depression     []1   []1    Scoring Totals 1 3     Total Score  Low Risk  Moderate Risk  High Risk   Risk Category   0 - 3   4 - 7   8 or Above      Patient states symptoms interfere with:  A.  General Activity:  yes  B. Mood: yes  C. Walking Ability:   yes  D. Normal Work (Includes both work outside the home and housework):yes  E.  Relations with Other People:  yes   F. Sleep:  yes  G.  Enjoyment of Life:  yes      HPI:     ChiefComplaint: Right shoulder pain    Shoulder Pain    The pain is present in the right shoulder. Chronicity: Patient is a 79 y/o female comes with right shoulde pain for several years, but severe in last 6 months. States she had a fall last year and tried to get up using right upper extremity and pain developed. States saw Dr Shey Daniel not surgical candidate. The problem occurs constantly. The problem has been rapidly worsening. The quality of the pain is described as aching (stabbing). Pain scale: States pain scale at worse is a 10/10 and at best is a 6/10, Pertinent negatives include no fever or numbness. The symptoms are aggravated by activity. She has tried oral narcotics (Tramadol helps slightly. States was given Norco developed side effects.) for the symptoms. Her past medical history is significant for osteoarthritis. XR RIGHT SHOULDER 10/12/2018:  FINDINGS: No fracture or dislocation is seen. There is moderate cephalad migration right humeral head, consistent with chronic rotator cuff tear/degeneration. There is moderately severe narrowing of the glenohumeral joint with associated eburnation and    hypertrophic spurring. Mild degenerative changes also involve the right AC joint.           Impression   Multifocal degenerative changes. Chronic rotator cuff tear/degeneration. No acute findings.               The patient is allergic to amoxicillin-pot clavulanate; statins support therapy; and sulfa antibiotics. PastMedical History  Albina Patel  has a past medical history of Alcohol abuse, Anxiety, Colon polyp, Depression, Gastric ulcer, GERD (gastroesophageal reflux disease), Hyperlipidemia, Hypertension, Hypothyroid, Incisional hernia of anterior abdominal wall without obstruction or gangrene, Neuropathy, peripheral, Osteoarthritis, Osteoporosis, Thyroid disease, and Vitamin D deficiency.     Past Surgical History  The patient  has a past surgical history that includes Cholecystectomy; joint replacement; hernia repair; Hemorrhoid surgery (); Endoscopy, colon, diagnostic (8-2014); eye surgery; Tonsillectomy; Colonoscopy; Upper gastrointestinal endoscopy; LAPAROTOMY EXPLORATORY (N/A, 11/29/2017); and Stomach surgery (11/2017). Family History  This patient's family history includes Bleeding Prob in her mother; Heart Attack in her father; Heart Disease in her father; Stroke in her mother. Social History  Binh Richards  reports that she quit smoking about 56 years ago. She has never used smokeless tobacco. She reports that she does not drink alcohol or use drugs. Medications    Current Outpatient Medications:     traMADol (ULTRAM) 50 MG tablet, Take 1 tablet by mouth every 4 hours as needed for Pain for up to 30 days. , Disp: 150 tablet, Rfl: 0    citalopram (CELEXA) 40 MG tablet, take 1/2 tablet by mouth once daily, Disp: 45 tablet, Rfl: 1    levothyroxine (SYNTHROID) 50 MCG tablet, take 1 tablet by mouth once daily, Disp: 90 tablet, Rfl: 1    LORazepam (ATIVAN) 0.5 MG tablet, take 1 tablet by mouth every 6 hours if needed for anxiety, Disp: 120 tablet, Rfl: 0    pantoprazole (PROTONIX) 40 MG tablet, take 1 tablet by mouth twice daily 30 MINUTES PRIOR TO AM MEAL AND SUPPER, Disp: 180 tablet, Rfl: 1    hydroxychloroquine (PLAQUENIL) 200 MG tablet, Take 200 mg by mouth daily , Disp: , Rfl: 0    Mirabegron ER (MYRBETRIQ) 50 MG TB24, Take 50 mg by mouth daily, Disp: 90 tablet, Rfl: 1    gabapentin (NEURONTIN) 100 MG capsule, take 3 capsule by mouth at bedtime, Disp: 270 capsule, Rfl: 1    Multiple Vitamins-Minerals (PRESERVISION/LUTEIN) CAPS, Take 1 capsule by mouth 2 times daily. , Disp: , Rfl:     Subjective:      Review of Systems   Constitutional: Positive for activity change. Negative for appetite change, chills, diaphoresis, fatigue, fever and unexpected weight change. HENT: Negative for congestion, ear pain, hearing loss, mouth sores, nosebleeds, rhinorrhea, sinus pressure and sore throat.     Eyes: Negative for photophobia, pain and visual disturbance. Respiratory: Negative for cough, chest tightness, shortness of breath and wheezing. Cardiovascular: Negative for chest pain and palpitations. Gastrointestinal: Positive for constipation. Negative for abdominal pain, diarrhea, nausea and vomiting. Hx Hernia   Endocrine: Negative for cold intolerance, heat intolerance, polydipsia, polyphagia and polyuria. Genitourinary: Negative for decreased urine volume, difficulty urinating, frequency and hematuria. Musculoskeletal: Positive for arthralgias and back pain. Negative for gait problem, joint swelling, myalgias, neck pain and neck stiffness. Skin: Negative for color change and rash. Allergic/Immunologic: Negative for food allergies and immunocompromised state. Neurological: Negative for dizziness, tremors, seizures, syncope, facial asymmetry, speech difficulty, weakness, light-headedness, numbness and headaches. Hematological: Does not bruise/bleed easily. Psychiatric/Behavioral: Negative for agitation, behavioral problems, confusion, decreased concentration, dysphoric mood, hallucinations, self-injury, sleep disturbance and suicidal ideas. The patient is nervous/anxious. The patient is not hyperactive. Objective:     Vitals:    06/05/19 1446   BP: 116/63   Site: Right Upper Arm   Position: Sitting   Cuff Size: Medium Adult   Pulse: 71   Weight: 118 lb (53.5 kg)   Height: 5' 3\" (1.6 m)       Physical Exam   Constitutional: She is oriented to person, place, and time. No distress. Elderly fragile female   HENT:   Head: Normocephalic and atraumatic. Right Ear: External ear normal.   Left Ear: External ear normal.   Nose: Nose normal.   Mouth/Throat: Oropharynx is clear and moist. No oropharyngeal exudate. Eyes: Pupils are equal, round, and reactive to light. Conjunctivae and EOM are normal. Right eye exhibits no discharge. Left eye exhibits no discharge. No scleral icterus.    Neck: Normal sign on the right side. She displays no Babinski's sign on the left side. Reflex Scores:       Tricep reflexes are 2+ on the right side and 2+ on the left side. Bicep reflexes are 2+ on the right side and 2+ on the left side. Brachioradialis reflexes are 2+ on the right side and 2+ on the left side. Patellar reflexes are 1+ on the right side and 1+ on the left side. Achilles reflexes are 1+ on the right side and 1+ on the left side. Gait-cane   Skin: Skin is warm. No rash noted. She is not diaphoretic. No erythema. No pallor. Psychiatric: Her speech is normal and behavior is normal. Judgment and thought content normal. Her mood appears not anxious. Her affect is not angry, not blunt, not labile and not inappropriate. She is not agitated, not aggressive, not hyperactive, not slowed, not withdrawn, not actively hallucinating and not combative. Thought content is not paranoid and not delusional. Cognition and memory are normal. Cognition and memory are not impaired. She does not express impulsivity or inappropriate judgment. She does not exhibit a depressed mood. She expresses no homicidal and no suicidal ideation. She expresses no suicidal plans and no homicidal plans. She exhibits normal recent memory and normal remote memory. She is attentive. Nursing note and vitals reviewed. Assessment:     1. Primary osteoarthritis of right shoulder    2. Primary osteoarthritis of both hands    3. Chronic pain syndrome            Plan:      · Patient read and signed orientation and opioid agreement. · OARRS reviewed. Current MED: 32  · Patient was offered naloxone for home. · Discussed long term side effects of medications, tolerance, dependency and addiction. · UDS preformed today. · Patient told can not receive any pain medications from any other source. · No evidence of abuse, diversion or aberrant behavior.   · Prescription Needs: Await clean UDS for future prescription needs   Medications and/or procedures to improve function and quality of life- patient understanding with this and that may not be pain free   Discussed possible weaning of medication dosing dependent on treatment/procedure results.  Discussed with patient about safe storage of medications at home   Testing: XR reviewed.  Procedures: Had shoulder injection no benefit.  Discussed with patient about risks with procedure including infection, reaction to medication, increased pain, or bleeding.  Medications:  Told patient to increase Tramadol 50 mg TWO every 8 hrs. Also, may take Tylenol . Patient to call for refill , will run out early. Previous Treatments tried:  · PT: Yes,  any benefit? No,  Stopped severe pain  · NSAIDs: No,  Hx ulcers  · Chiropractic: No,  any benefit? No  · Muscle relaxants: No,  any benefit? No  · Narcotics: Yes,  any benefit? Yes  · Ortho surgeon consult: Yes  · Any Implants: Yes    Meds. Prescribed:   No orders of the defined types were placed in this encounter. Return in about 6 weeks (around 7/17/2019) for F/U pain meds. re- evaluation with NP. Time spent with patient was 60 minutes more than 50% was spent  Counseling/coordinated the patient'scare.     Electronically signed by Zhane Roberts MD on 6/5/2019 at 6:15 PM

## 2019-06-08 DIAGNOSIS — G60.9 IDIOPATHIC PERIPHERAL NEUROPATHY: ICD-10-CM

## 2019-06-10 NOTE — TELEPHONE ENCOUNTER
Date of last visit:  4/17/2019  Date of next visit:  7/17/2019    Requested Prescriptions     Pending Prescriptions Disp Refills    gabapentin (NEURONTIN) 100 MG capsule [Pharmacy Med Name: GABAPENTIN 100 MG CAPSULE] 270 capsule 1     Sig: take 3 capsules by mouth at bedtime

## 2019-06-11 RX ORDER — GABAPENTIN 100 MG/1
CAPSULE ORAL
Qty: 270 CAPSULE | Refills: 1 | Status: SHIPPED | OUTPATIENT
Start: 2019-06-11 | End: 2019-10-31 | Stop reason: SDUPTHER

## 2019-06-20 DIAGNOSIS — F41.9 ANXIETY: ICD-10-CM

## 2019-06-20 NOTE — TELEPHONE ENCOUNTER
Date of last visit:  4/17/2019  Date of next visit:  7/17/2019    Requested Prescriptions     Pending Prescriptions Disp Refills    LORazepam (ATIVAN) 0.5 MG tablet 120 tablet 0     Sig: take 1 tablet by mouth every 6 hours if needed for anxiety

## 2019-06-21 RX ORDER — LORAZEPAM 0.5 MG/1
TABLET ORAL
Qty: 120 TABLET | Refills: 0 | Status: SHIPPED | OUTPATIENT
Start: 2019-06-21 | End: 2019-09-06 | Stop reason: SDUPTHER

## 2019-06-26 DIAGNOSIS — S76.011A HIP STRAIN, RIGHT, INITIAL ENCOUNTER: ICD-10-CM

## 2019-06-26 DIAGNOSIS — M15.9 PRIMARY OSTEOARTHRITIS INVOLVING MULTIPLE JOINTS: ICD-10-CM

## 2019-06-26 DIAGNOSIS — G89.29 CHRONIC MIDLINE LOW BACK PAIN WITHOUT SCIATICA: ICD-10-CM

## 2019-06-26 DIAGNOSIS — M54.50 CHRONIC MIDLINE LOW BACK PAIN WITHOUT SCIATICA: ICD-10-CM

## 2019-06-26 NOTE — TELEPHONE ENCOUNTER
Marley Ahumada called requesting a refill on the following medications:  Requested Prescriptions     Pending Prescriptions Disp Refills    traMADol (ULTRAM) 50 MG tablet 150 tablet 0     Sig: Take 1 tablet by mouth every 4 hours as needed for Pain for up to 30 days.      Pharmacy verified: [facility] [address]      Date of last visit: 6/5/19  Date of next visit (if applicable): 3/90/7520

## 2019-06-27 RX ORDER — TRAMADOL HYDROCHLORIDE 50 MG/1
100 TABLET ORAL EVERY 8 HOURS PRN
Qty: 150 TABLET | Refills: 0 | Status: SHIPPED | OUTPATIENT
Start: 2019-06-27 | End: 2019-07-17 | Stop reason: SDUPTHER

## 2019-07-17 ENCOUNTER — OFFICE VISIT (OUTPATIENT)
Dept: PHYSICAL MEDICINE AND REHAB | Age: 84
End: 2019-07-17
Payer: MEDICARE

## 2019-07-17 VITALS
WEIGHT: 116.84 LBS | HEIGHT: 63 IN | DIASTOLIC BLOOD PRESSURE: 76 MMHG | BODY MASS INDEX: 20.7 KG/M2 | SYSTOLIC BLOOD PRESSURE: 144 MMHG | HEART RATE: 88 BPM

## 2019-07-17 DIAGNOSIS — M54.50 CHRONIC MIDLINE LOW BACK PAIN WITHOUT SCIATICA: ICD-10-CM

## 2019-07-17 DIAGNOSIS — G89.4 CHRONIC PAIN SYNDROME: ICD-10-CM

## 2019-07-17 DIAGNOSIS — S76.011A HIP STRAIN, RIGHT, INITIAL ENCOUNTER: ICD-10-CM

## 2019-07-17 DIAGNOSIS — M19.042 PRIMARY OSTEOARTHRITIS OF BOTH HANDS: ICD-10-CM

## 2019-07-17 DIAGNOSIS — M15.9 PRIMARY OSTEOARTHRITIS INVOLVING MULTIPLE JOINTS: Primary | ICD-10-CM

## 2019-07-17 DIAGNOSIS — M19.011 PRIMARY OSTEOARTHRITIS OF RIGHT SHOULDER: ICD-10-CM

## 2019-07-17 DIAGNOSIS — G89.29 CHRONIC MIDLINE LOW BACK PAIN WITHOUT SCIATICA: ICD-10-CM

## 2019-07-17 DIAGNOSIS — M19.041 PRIMARY OSTEOARTHRITIS OF BOTH HANDS: ICD-10-CM

## 2019-07-17 PROCEDURE — G8427 DOCREV CUR MEDS BY ELIG CLIN: HCPCS | Performed by: NURSE PRACTITIONER

## 2019-07-17 PROCEDURE — 4040F PNEUMOC VAC/ADMIN/RCVD: CPT | Performed by: NURSE PRACTITIONER

## 2019-07-17 PROCEDURE — G8420 CALC BMI NORM PARAMETERS: HCPCS | Performed by: NURSE PRACTITIONER

## 2019-07-17 PROCEDURE — 1036F TOBACCO NON-USER: CPT | Performed by: NURSE PRACTITIONER

## 2019-07-17 PROCEDURE — 1123F ACP DISCUSS/DSCN MKR DOCD: CPT | Performed by: NURSE PRACTITIONER

## 2019-07-17 PROCEDURE — 99213 OFFICE O/P EST LOW 20 MIN: CPT | Performed by: NURSE PRACTITIONER

## 2019-07-17 PROCEDURE — 1090F PRES/ABSN URINE INCON ASSESS: CPT | Performed by: NURSE PRACTITIONER

## 2019-07-17 RX ORDER — TRAMADOL HYDROCHLORIDE 50 MG/1
100 TABLET ORAL EVERY 8 HOURS PRN
Qty: 180 TABLET | Refills: 0 | Status: SHIPPED | OUTPATIENT
Start: 2019-07-17 | End: 2019-08-22 | Stop reason: SDUPTHER

## 2019-07-17 ASSESSMENT — ENCOUNTER SYMPTOMS
NAUSEA: 0
BACK PAIN: 1
ABDOMINAL PAIN: 0
VOMITING: 0
WHEEZING: 0
RHINORRHEA: 0
CHEST TIGHTNESS: 0
EYE PAIN: 0
PHOTOPHOBIA: 0
CONSTIPATION: 1
COUGH: 0
DIARRHEA: 0
COLOR CHANGE: 0
SORE THROAT: 0
SINUS PRESSURE: 0
SHORTNESS OF BREATH: 0

## 2019-07-17 NOTE — PROGRESS NOTES
found. Left knee: She exhibits normal range of motion and no swelling. No tenderness found. Right ankle: She exhibits no swelling. Left ankle: She exhibits no swelling. Cervical back: She exhibits decreased range of motion and tenderness. Thoracic back: She exhibits tenderness. Lumbar back: She exhibits decreased range of motion and tenderness. Right hand: She exhibits deformity and swelling. Left hand: She exhibits deformity and swelling. Right lower leg: She exhibits no swelling. Left lower leg: She exhibits no swelling. Legs:  Neurological: She is alert and oriented to person, place, and time. She has normal strength. She is not disoriented. She displays no atrophy. No cranial nerve deficit or sensory deficit. She exhibits normal muscle tone. She displays a negative Romberg sign. Gait abnormal. Coordination normal. She displays no Babinski's sign on the right side. She displays no Babinski's sign on the left side. Reflex Scores:       Tricep reflexes are 2+ on the right side and 2+ on the left side. Bicep reflexes are 2+ on the right side and 2+ on the left side. Brachioradialis reflexes are 2+ on the right side and 2+ on the left side. Patellar reflexes are 1+ on the right side and 1+ on the left side. Achilles reflexes are 1+ on the right side and 1+ on the left side. Gait-cane   Skin: Skin is warm. No rash noted. She is not diaphoretic. No erythema. No pallor. Psychiatric: Her speech is normal and behavior is normal. Judgment and thought content normal. Her mood appears not anxious. Her affect is not angry, not blunt, not labile and not inappropriate. She is not agitated, not aggressive, not hyperactive, not slowed, not withdrawn, not actively hallucinating and not combative. Thought content is not paranoid and not delusional. Cognition and memory are normal. Cognition and memory are not impaired.  She does

## 2019-07-18 ENCOUNTER — OFFICE VISIT (OUTPATIENT)
Dept: FAMILY MEDICINE CLINIC | Age: 84
End: 2019-07-18

## 2019-07-18 VITALS
SYSTOLIC BLOOD PRESSURE: 124 MMHG | BODY MASS INDEX: 20.55 KG/M2 | HEIGHT: 63 IN | RESPIRATION RATE: 12 BRPM | WEIGHT: 116 LBS | DIASTOLIC BLOOD PRESSURE: 60 MMHG | HEART RATE: 64 BPM

## 2019-07-18 DIAGNOSIS — M54.50 CHRONIC MIDLINE LOW BACK PAIN WITHOUT SCIATICA: ICD-10-CM

## 2019-07-18 DIAGNOSIS — I10 ESSENTIAL HYPERTENSION: Primary | ICD-10-CM

## 2019-07-18 DIAGNOSIS — G89.29 CHRONIC MIDLINE LOW BACK PAIN WITHOUT SCIATICA: ICD-10-CM

## 2019-07-18 DIAGNOSIS — E78.00 PURE HYPERCHOLESTEROLEMIA: ICD-10-CM

## 2019-07-18 DIAGNOSIS — E03.4 HYPOTHYROIDISM DUE TO ACQUIRED ATROPHY OF THYROID: ICD-10-CM

## 2019-07-18 DIAGNOSIS — G60.9 IDIOPATHIC PERIPHERAL NEUROPATHY: ICD-10-CM

## 2019-07-18 DIAGNOSIS — K25.5 GASTRIC ULCER WITH PERFORATION, UNSPECIFIED CHRONICITY (HCC): ICD-10-CM

## 2019-07-18 DIAGNOSIS — K21.9 GASTROESOPHAGEAL REFLUX DISEASE WITHOUT ESOPHAGITIS: ICD-10-CM

## 2019-07-18 PROCEDURE — 1123F ACP DISCUSS/DSCN MKR DOCD: CPT | Performed by: FAMILY MEDICINE

## 2019-07-18 PROCEDURE — 1036F TOBACCO NON-USER: CPT | Performed by: FAMILY MEDICINE

## 2019-07-18 PROCEDURE — G8420 CALC BMI NORM PARAMETERS: HCPCS | Performed by: FAMILY MEDICINE

## 2019-07-18 PROCEDURE — 99213 OFFICE O/P EST LOW 20 MIN: CPT | Performed by: FAMILY MEDICINE

## 2019-07-18 PROCEDURE — 1090F PRES/ABSN URINE INCON ASSESS: CPT | Performed by: FAMILY MEDICINE

## 2019-07-18 PROCEDURE — 4040F PNEUMOC VAC/ADMIN/RCVD: CPT | Performed by: FAMILY MEDICINE

## 2019-07-18 PROCEDURE — G8427 DOCREV CUR MEDS BY ELIG CLIN: HCPCS | Performed by: FAMILY MEDICINE

## 2019-07-18 RX ORDER — LEVOTHYROXINE SODIUM 0.05 MG/1
TABLET ORAL
Qty: 90 TABLET | Refills: 1 | Status: SHIPPED | OUTPATIENT
Start: 2019-07-18 | End: 2020-03-24 | Stop reason: SDUPTHER

## 2019-07-18 ASSESSMENT — ENCOUNTER SYMPTOMS
CONSTIPATION: 0
EYE PAIN: 0
COUGH: 0
ABDOMINAL PAIN: 0
NAUSEA: 0
SHORTNESS OF BREATH: 0
SORE THROAT: 0
WHEEZING: 0
CHEST TIGHTNESS: 0

## 2019-07-23 LAB
ABSOLUTE BASO #: 0 X10E9/L (ref 0–0.9)
ABSOLUTE EOS #: 0.2 X10E9/L (ref 0–0.4)
ABSOLUTE LYMPH #: 0.8 X10E9/L (ref 1–3.5)
ABSOLUTE MONO #: 0.3 X10E9/L (ref 0–0.9)
ABSOLUTE NEUT #: 3.2 X10E9/L (ref 1.5–6.6)
ALBUMIN SERPL-MCNC: 3.9 G/DL (ref 3.2–5.3)
ALK PHOSPHATASE: 73 U/L (ref 39–130)
ALT SERPL-CCNC: 9 U/L (ref 0–31)
ANION GAP SERPL CALCULATED.3IONS-SCNC: 6 MMOL/L (ref 4–12)
AST SERPL-CCNC: 20 U/L (ref 0–41)
BASOPHILS RELATIVE PERCENT: 1 %
BILIRUB SERPL-MCNC: 0.4 MG/DL (ref 0.3–1.2)
BILIRUBIN DIRECT: 0.1 MG/DL (ref 0–0.4)
BUN BLDV-MCNC: 15 MG/DL (ref 5–27)
CALCIUM SERPL-MCNC: 9.6 MG/DL (ref 8.5–10.5)
CHLORIDE BLD-SCNC: 103 MMOL/L (ref 98–109)
CO2: 31 MMOL/L (ref 22–32)
CREAT SERPL-MCNC: 1.19 MG/DL (ref 0.4–1)
EGFR AFRICAN AMERICAN: 52 ML/MIN/1.73SQ.M
EGFR IF NONAFRICAN AMERICAN: 43 ML/MIN/1.73SQ.M
EOSINOPHILS RELATIVE PERCENT: 3.6 %
GLUCOSE: 58 MG/DL (ref 65–99)
HCT VFR BLD CALC: 32 % (ref 34–48)
HEMOGLOBIN: 10.7 G/DL (ref 11.7–16.1)
LYMPHOCYTE %: 17.2 %
MCH RBC QN AUTO: 31 PG (ref 27–35)
MCHC RBC AUTO-ENTMCNC: 33.5 G/DL (ref 31–36)
MCV RBC AUTO: 93 FL (ref 81–101)
MONOCYTES # BLD: 7.6 %
NEUTROPHILS RELATIVE PERCENT: 70.6 %
PDW BLD-RTO: 13.7 % (ref 11.5–14.7)
PLATELETS: 241 X10E9/L (ref 150–450)
PMV BLD AUTO: 8.9 FL (ref 7–12)
POTASSIUM SERPL-SCNC: 4.7 MMOL/L (ref 3.5–5)
RBC: 3.46 X10E12/L (ref 3.3–5.5)
SODIUM BLD-SCNC: 140 MMOL/L (ref 134–146)
T4 FREE: 1.02 NG/DL (ref 0.61–1.6)
TOTAL PROTEIN: 6.5 G/DL (ref 6–8)
TSH SERPL DL<=0.05 MIU/L-ACNC: 1.23 UIU/ML (ref 0.49–4.67)
WBC: 4.5 X10E9/L (ref 4–11.8)

## 2019-07-25 ENCOUNTER — TELEPHONE (OUTPATIENT)
Dept: FAMILY MEDICINE CLINIC | Age: 84
End: 2019-07-25

## 2019-07-29 ENCOUNTER — TELEPHONE (OUTPATIENT)
Dept: FAMILY MEDICINE CLINIC | Age: 84
End: 2019-07-29

## 2019-07-29 DIAGNOSIS — M25.571 ACUTE RIGHT ANKLE PAIN: Primary | ICD-10-CM

## 2019-08-22 DIAGNOSIS — M15.9 PRIMARY OSTEOARTHRITIS INVOLVING MULTIPLE JOINTS: ICD-10-CM

## 2019-08-22 DIAGNOSIS — S76.011A HIP STRAIN, RIGHT, INITIAL ENCOUNTER: ICD-10-CM

## 2019-08-22 DIAGNOSIS — M54.50 CHRONIC MIDLINE LOW BACK PAIN WITHOUT SCIATICA: ICD-10-CM

## 2019-08-22 DIAGNOSIS — G89.29 CHRONIC MIDLINE LOW BACK PAIN WITHOUT SCIATICA: ICD-10-CM

## 2019-08-22 RX ORDER — TRAMADOL HYDROCHLORIDE 50 MG/1
100 TABLET ORAL EVERY 8 HOURS PRN
Qty: 180 TABLET | Refills: 0 | Status: SHIPPED | OUTPATIENT
Start: 2019-08-22 | End: 2019-09-27 | Stop reason: SDUPTHER

## 2019-08-22 NOTE — TELEPHONE ENCOUNTER
Nigel Casillas called requesting a refill on the following medications:  Requested Prescriptions     Pending Prescriptions Disp Refills    traMADol (ULTRAM) 50 MG tablet 180 tablet 0     Sig: Take 2 tablets by mouth every 8 hours as needed for Pain for up to 30 days. [facility]      Date of last visit: 7/17/19  Date of next visit (if applicable): 35/15/7388

## 2019-08-22 NOTE — TELEPHONE ENCOUNTER
OARRS reviewed. UDS: + for    Lorazepam POSITIVE CONSISTENT Gabapentin POSITIVE  Tramadol POSITIVE CONSISTENT  O-Desmethyltramadol POSITIVE CONSISTENT      Narcan offered: yes       Last seen: 7/17/2019.        Follow-up:   Future Appointments   Date Time Provider Singh Hoang   10/16/2019  3:00 PM KARTHIKEYAN Stoll - CNP [facility]   10/23/2019 11:10 AM Yamini Graven, MD [facility]

## 2019-08-29 ENCOUNTER — TELEPHONE (OUTPATIENT)
Dept: FAMILY MEDICINE CLINIC | Age: 84
End: 2019-08-29

## 2019-08-29 RX ORDER — NITROFURANTOIN 25; 75 MG/1; MG/1
100 CAPSULE ORAL 2 TIMES DAILY
Qty: 20 CAPSULE | Refills: 0 | Status: SHIPPED | OUTPATIENT
Start: 2019-08-29 | End: 2019-09-08

## 2019-08-30 DIAGNOSIS — N31.9 NEUROGENIC BLADDER: ICD-10-CM

## 2019-09-04 RX ORDER — MIRABEGRON 50 MG/1
TABLET, FILM COATED, EXTENDED RELEASE ORAL
Qty: 90 TABLET | Refills: 1 | Status: SHIPPED | OUTPATIENT
Start: 2019-09-04 | End: 2020-02-28 | Stop reason: SDUPTHER

## 2019-09-05 DIAGNOSIS — F41.9 ANXIETY: ICD-10-CM

## 2019-09-06 RX ORDER — LORAZEPAM 0.5 MG/1
TABLET ORAL
Qty: 120 TABLET | Refills: 0 | Status: SHIPPED | OUTPATIENT
Start: 2019-09-06 | End: 2019-11-08 | Stop reason: SDUPTHER

## 2019-09-18 ENCOUNTER — OFFICE VISIT (OUTPATIENT)
Dept: FAMILY MEDICINE CLINIC | Age: 84
End: 2019-09-18

## 2019-09-18 VITALS
HEIGHT: 63 IN | DIASTOLIC BLOOD PRESSURE: 66 MMHG | HEART RATE: 72 BPM | SYSTOLIC BLOOD PRESSURE: 110 MMHG | BODY MASS INDEX: 20.46 KG/M2 | RESPIRATION RATE: 16 BRPM | WEIGHT: 115.5 LBS

## 2019-09-18 DIAGNOSIS — F33.41 RECURRENT MAJOR DEPRESSIVE DISORDER, IN PARTIAL REMISSION (HCC): ICD-10-CM

## 2019-09-18 DIAGNOSIS — F41.9 ANXIETY: ICD-10-CM

## 2019-09-18 DIAGNOSIS — E78.00 PURE HYPERCHOLESTEROLEMIA: ICD-10-CM

## 2019-09-18 DIAGNOSIS — K25.5 GASTRIC ULCER WITH PERFORATION, UNSPECIFIED CHRONICITY (HCC): ICD-10-CM

## 2019-09-18 DIAGNOSIS — I10 ESSENTIAL HYPERTENSION: ICD-10-CM

## 2019-09-18 DIAGNOSIS — K21.9 GASTROESOPHAGEAL REFLUX DISEASE WITHOUT ESOPHAGITIS: ICD-10-CM

## 2019-09-18 DIAGNOSIS — R35.0 URINARY FREQUENCY: ICD-10-CM

## 2019-09-18 DIAGNOSIS — M19.042 PRIMARY OSTEOARTHRITIS OF BOTH HANDS: ICD-10-CM

## 2019-09-18 DIAGNOSIS — M19.041 PRIMARY OSTEOARTHRITIS OF BOTH HANDS: ICD-10-CM

## 2019-09-18 DIAGNOSIS — G60.9 IDIOPATHIC PERIPHERAL NEUROPATHY: ICD-10-CM

## 2019-09-18 DIAGNOSIS — N31.9 NEUROGENIC BLADDER: Primary | ICD-10-CM

## 2019-09-18 LAB
BACTERIA URINE, POC: NORMAL
BILIRUBIN URINE: 0 MG/DL
BLOOD, URINE: NEGATIVE
CASTS URINE, POC: NORMAL
CLARITY: CLEAR
COLOR: NORMAL
CRYSTALS URINE, POC: NORMAL
EPI CELLS URINE, POC: NORMAL
GLUCOSE URINE: NEGATIVE
KETONES, URINE: NEGATIVE
LEUKOCYTE EST, POC: NEGATIVE
NITRITE, URINE: NEGATIVE
PH UA: 7 (ref 4.5–8)
PROTEIN UA: NEGATIVE
RBC URINE, POC: NORMAL
SPECIFIC GRAVITY UA: 1 (ref 1–1.03)
UROBILINOGEN, URINE: NORMAL
WBC URINE, POC: NORMAL
YEAST URINE, POC: NORMAL

## 2019-09-18 PROCEDURE — 81000 URINALYSIS NONAUTO W/SCOPE: CPT | Performed by: FAMILY MEDICINE

## 2019-09-18 PROCEDURE — G8420 CALC BMI NORM PARAMETERS: HCPCS | Performed by: FAMILY MEDICINE

## 2019-09-18 PROCEDURE — 1036F TOBACCO NON-USER: CPT | Performed by: FAMILY MEDICINE

## 2019-09-18 PROCEDURE — G8427 DOCREV CUR MEDS BY ELIG CLIN: HCPCS | Performed by: FAMILY MEDICINE

## 2019-09-18 PROCEDURE — 1123F ACP DISCUSS/DSCN MKR DOCD: CPT | Performed by: FAMILY MEDICINE

## 2019-09-18 PROCEDURE — 4040F PNEUMOC VAC/ADMIN/RCVD: CPT | Performed by: FAMILY MEDICINE

## 2019-09-18 PROCEDURE — 99213 OFFICE O/P EST LOW 20 MIN: CPT | Performed by: FAMILY MEDICINE

## 2019-09-18 PROCEDURE — 1090F PRES/ABSN URINE INCON ASSESS: CPT | Performed by: FAMILY MEDICINE

## 2019-09-18 ASSESSMENT — ENCOUNTER SYMPTOMS
CONSTIPATION: 0
WHEEZING: 0
NAUSEA: 1
COUGH: 0
SHORTNESS OF BREATH: 0
SORE THROAT: 0
ABDOMINAL PAIN: 0
EYE PAIN: 0
CHEST TIGHTNESS: 0
HEARTBURN: 0
GLOBUS SENSATION: 0

## 2019-09-26 DIAGNOSIS — G89.29 CHRONIC MIDLINE LOW BACK PAIN WITHOUT SCIATICA: ICD-10-CM

## 2019-09-26 DIAGNOSIS — S76.011A HIP STRAIN, RIGHT, INITIAL ENCOUNTER: ICD-10-CM

## 2019-09-26 DIAGNOSIS — M15.9 PRIMARY OSTEOARTHRITIS INVOLVING MULTIPLE JOINTS: ICD-10-CM

## 2019-09-26 DIAGNOSIS — M54.50 CHRONIC MIDLINE LOW BACK PAIN WITHOUT SCIATICA: ICD-10-CM

## 2019-09-27 RX ORDER — TRAMADOL HYDROCHLORIDE 50 MG/1
100 TABLET ORAL EVERY 8 HOURS PRN
Qty: 180 TABLET | Refills: 0 | Status: SHIPPED | OUTPATIENT
Start: 2019-09-27 | End: 2019-10-28 | Stop reason: SDUPTHER

## 2019-10-11 ENCOUNTER — HOSPITAL ENCOUNTER (OUTPATIENT)
Dept: WOMENS IMAGING | Age: 84
Discharge: HOME | End: 2019-10-11
Payer: MEDICARE

## 2019-10-11 ENCOUNTER — NURSE ONLY (OUTPATIENT)
Dept: FAMILY MEDICINE CLINIC | Age: 84
End: 2019-10-11

## 2019-10-11 DIAGNOSIS — Z12.31 VISIT FOR SCREENING MAMMOGRAM: ICD-10-CM

## 2019-10-11 DIAGNOSIS — Z23 NEED FOR INFLUENZA VACCINATION: Primary | ICD-10-CM

## 2019-10-11 PROCEDURE — 90688 IIV4 VACCINE SPLT 0.5 ML IM: CPT | Performed by: FAMILY MEDICINE

## 2019-10-11 PROCEDURE — 77063 BREAST TOMOSYNTHESIS BI: CPT

## 2019-10-11 PROCEDURE — G0008 ADMIN INFLUENZA VIRUS VAC: HCPCS | Performed by: FAMILY MEDICINE

## 2019-10-16 ENCOUNTER — OFFICE VISIT (OUTPATIENT)
Dept: PHYSICAL MEDICINE AND REHAB | Age: 84
End: 2019-10-16
Payer: MEDICARE

## 2019-10-16 VITALS
BODY MASS INDEX: 20.31 KG/M2 | HEIGHT: 63 IN | SYSTOLIC BLOOD PRESSURE: 128 MMHG | WEIGHT: 114.64 LBS | HEART RATE: 72 BPM | DIASTOLIC BLOOD PRESSURE: 78 MMHG

## 2019-10-16 DIAGNOSIS — M19.042 PRIMARY OSTEOARTHRITIS OF BOTH HANDS: ICD-10-CM

## 2019-10-16 DIAGNOSIS — G89.4 CHRONIC PAIN SYNDROME: ICD-10-CM

## 2019-10-16 DIAGNOSIS — M15.9 PRIMARY OSTEOARTHRITIS INVOLVING MULTIPLE JOINTS: Primary | ICD-10-CM

## 2019-10-16 DIAGNOSIS — M19.011 PRIMARY OSTEOARTHRITIS OF RIGHT SHOULDER: ICD-10-CM

## 2019-10-16 DIAGNOSIS — M19.041 PRIMARY OSTEOARTHRITIS OF BOTH HANDS: ICD-10-CM

## 2019-10-16 PROCEDURE — 1036F TOBACCO NON-USER: CPT | Performed by: NURSE PRACTITIONER

## 2019-10-16 PROCEDURE — G8427 DOCREV CUR MEDS BY ELIG CLIN: HCPCS | Performed by: NURSE PRACTITIONER

## 2019-10-16 PROCEDURE — G8482 FLU IMMUNIZE ORDER/ADMIN: HCPCS | Performed by: NURSE PRACTITIONER

## 2019-10-16 PROCEDURE — 1123F ACP DISCUSS/DSCN MKR DOCD: CPT | Performed by: NURSE PRACTITIONER

## 2019-10-16 PROCEDURE — 99213 OFFICE O/P EST LOW 20 MIN: CPT | Performed by: NURSE PRACTITIONER

## 2019-10-16 PROCEDURE — 4040F PNEUMOC VAC/ADMIN/RCVD: CPT | Performed by: NURSE PRACTITIONER

## 2019-10-16 PROCEDURE — G8420 CALC BMI NORM PARAMETERS: HCPCS | Performed by: NURSE PRACTITIONER

## 2019-10-16 PROCEDURE — 1090F PRES/ABSN URINE INCON ASSESS: CPT | Performed by: NURSE PRACTITIONER

## 2019-10-16 ASSESSMENT — ENCOUNTER SYMPTOMS
RHINORRHEA: 0
VOMITING: 0
BACK PAIN: 1
SINUS PRESSURE: 0
SHORTNESS OF BREATH: 0
PHOTOPHOBIA: 0
CONSTIPATION: 1
WHEEZING: 0
COLOR CHANGE: 0
NAUSEA: 1
DIARRHEA: 0
SORE THROAT: 0
EYE PAIN: 0
ABDOMINAL PAIN: 1
COUGH: 0
CHEST TIGHTNESS: 0

## 2019-10-28 DIAGNOSIS — S76.011A HIP STRAIN, RIGHT, INITIAL ENCOUNTER: ICD-10-CM

## 2019-10-28 DIAGNOSIS — M15.9 PRIMARY OSTEOARTHRITIS INVOLVING MULTIPLE JOINTS: ICD-10-CM

## 2019-10-28 DIAGNOSIS — M54.50 CHRONIC MIDLINE LOW BACK PAIN WITHOUT SCIATICA: ICD-10-CM

## 2019-10-28 DIAGNOSIS — G89.29 CHRONIC MIDLINE LOW BACK PAIN WITHOUT SCIATICA: ICD-10-CM

## 2019-10-28 RX ORDER — TRAMADOL HYDROCHLORIDE 50 MG/1
100 TABLET ORAL EVERY 8 HOURS PRN
Qty: 180 TABLET | Refills: 0 | Status: SHIPPED | OUTPATIENT
Start: 2019-10-28 | End: 2019-11-26 | Stop reason: SDUPTHER

## 2019-10-31 DIAGNOSIS — G60.9 IDIOPATHIC PERIPHERAL NEUROPATHY: ICD-10-CM

## 2019-10-31 DIAGNOSIS — F41.9 ANXIETY: ICD-10-CM

## 2019-11-01 RX ORDER — CITALOPRAM 40 MG/1
TABLET ORAL
Qty: 45 TABLET | Refills: 1 | Status: SHIPPED | OUTPATIENT
Start: 2019-11-01 | End: 2020-03-24 | Stop reason: SDUPTHER

## 2019-11-01 RX ORDER — GABAPENTIN 100 MG/1
CAPSULE ORAL
Qty: 270 CAPSULE | Refills: 0 | Status: SHIPPED | OUTPATIENT
Start: 2019-12-01 | End: 2020-02-02 | Stop reason: SDUPTHER

## 2019-11-07 DIAGNOSIS — F41.9 ANXIETY: ICD-10-CM

## 2019-11-08 RX ORDER — LORAZEPAM 0.5 MG/1
TABLET ORAL
Qty: 120 TABLET | Refills: 0 | Status: SHIPPED | OUTPATIENT
Start: 2019-11-08 | End: 2019-12-30 | Stop reason: SDUPTHER

## 2019-11-26 DIAGNOSIS — M15.9 PRIMARY OSTEOARTHRITIS INVOLVING MULTIPLE JOINTS: ICD-10-CM

## 2019-11-26 DIAGNOSIS — S76.011A HIP STRAIN, RIGHT, INITIAL ENCOUNTER: ICD-10-CM

## 2019-11-26 DIAGNOSIS — G89.29 CHRONIC MIDLINE LOW BACK PAIN WITHOUT SCIATICA: ICD-10-CM

## 2019-11-26 DIAGNOSIS — M54.50 CHRONIC MIDLINE LOW BACK PAIN WITHOUT SCIATICA: ICD-10-CM

## 2019-11-26 RX ORDER — TRAMADOL HYDROCHLORIDE 50 MG/1
100 TABLET ORAL EVERY 8 HOURS PRN
Qty: 180 TABLET | Refills: 0 | Status: SHIPPED | OUTPATIENT
Start: 2019-11-27 | End: 2019-12-30 | Stop reason: SDUPTHER

## 2019-12-30 DIAGNOSIS — M54.50 CHRONIC MIDLINE LOW BACK PAIN WITHOUT SCIATICA: ICD-10-CM

## 2019-12-30 DIAGNOSIS — G89.29 CHRONIC MIDLINE LOW BACK PAIN WITHOUT SCIATICA: ICD-10-CM

## 2019-12-30 DIAGNOSIS — S76.011A HIP STRAIN, RIGHT, INITIAL ENCOUNTER: ICD-10-CM

## 2019-12-30 DIAGNOSIS — M15.9 PRIMARY OSTEOARTHRITIS INVOLVING MULTIPLE JOINTS: ICD-10-CM

## 2019-12-30 DIAGNOSIS — F41.9 ANXIETY: ICD-10-CM

## 2019-12-30 RX ORDER — TRAMADOL HYDROCHLORIDE 50 MG/1
100 TABLET ORAL EVERY 8 HOURS PRN
Qty: 180 TABLET | Refills: 0 | Status: SHIPPED | OUTPATIENT
Start: 2019-12-30 | End: 2020-02-11 | Stop reason: SDUPTHER

## 2019-12-30 RX ORDER — LORAZEPAM 0.5 MG/1
TABLET ORAL
Qty: 120 TABLET | Refills: 0 | Status: SHIPPED | OUTPATIENT
Start: 2019-12-30 | End: 2020-03-01 | Stop reason: SDUPTHER

## 2020-01-10 ENCOUNTER — OFFICE VISIT (OUTPATIENT)
Dept: FAMILY MEDICINE CLINIC | Age: 85
End: 2020-01-10

## 2020-01-10 VITALS
HEIGHT: 63 IN | SYSTOLIC BLOOD PRESSURE: 122 MMHG | HEART RATE: 72 BPM | WEIGHT: 113.25 LBS | RESPIRATION RATE: 10 BRPM | DIASTOLIC BLOOD PRESSURE: 70 MMHG | BODY MASS INDEX: 20.07 KG/M2

## 2020-01-10 PROCEDURE — 1123F ACP DISCUSS/DSCN MKR DOCD: CPT | Performed by: FAMILY MEDICINE

## 2020-01-10 PROCEDURE — 1036F TOBACCO NON-USER: CPT | Performed by: FAMILY MEDICINE

## 2020-01-10 PROCEDURE — G8420 CALC BMI NORM PARAMETERS: HCPCS | Performed by: FAMILY MEDICINE

## 2020-01-10 PROCEDURE — 1090F PRES/ABSN URINE INCON ASSESS: CPT | Performed by: FAMILY MEDICINE

## 2020-01-10 PROCEDURE — G8427 DOCREV CUR MEDS BY ELIG CLIN: HCPCS | Performed by: FAMILY MEDICINE

## 2020-01-10 PROCEDURE — 99213 OFFICE O/P EST LOW 20 MIN: CPT | Performed by: FAMILY MEDICINE

## 2020-01-10 PROCEDURE — 4040F PNEUMOC VAC/ADMIN/RCVD: CPT | Performed by: FAMILY MEDICINE

## 2020-01-10 ASSESSMENT — ENCOUNTER SYMPTOMS
CHEST TIGHTNESS: 0
BACK PAIN: 1
WHEEZING: 0
COUGH: 0
SHORTNESS OF BREATH: 0
SORE THROAT: 0
ABDOMINAL PAIN: 0
NAUSEA: 0
EYE PAIN: 0
ORTHOPNEA: 0
CONSTIPATION: 0

## 2020-01-10 NOTE — PROGRESS NOTES
Subjective:      Patient ID: Juwan Hoskins is a 80 y.o. female. Constipation  Noted        Low  Back pain and on  Tramadol      gastrc  Ulcer  Noted       balance  Off  And  ? Exercise     Hypertension   This is a chronic problem. The current episode started more than 1 year ago. The problem has been resolved since onset. The problem is controlled. Pertinent negatives include no chest pain, headaches, neck pain, orthopnea, palpitations, peripheral edema or shortness of breath. The current treatment provides significant improvement. There are no compliance problems. Back Pain   This is a chronic problem. The current episode started more than 1 year ago. The problem has been resolved since onset. The pain is present in the lumbar spine. The pain is mild. The symptoms are aggravated by bending. Stiffness is present all day. Pertinent negatives include no abdominal pain, chest pain, dysuria, fever, headaches, numbness or weakness. She has tried analgesics for the symptoms. The treatment provided mild relief. Past Medical History:   Diagnosis Date    Alcohol abuse sober since Berta Kidney polyp     Dr. Shana Rao    Depression     Gastric ulcer 11/2017     nathanael     GERD (gastroesophageal reflux disease)     PUD 1998 Dr. Kelly Fink    Hyperlipidemia     Hypertension 12/3/2017    Hypothyroid     Incisional hernia of anterior abdominal wall without obstruction or gangrene     Neuropathy, peripheral     Osteoarthritis     Osteoporosis     Thyroid disease     hypothyroid    Vitamin D deficiency        Review of Systems   Constitutional: Negative for appetite change, fatigue and fever. HENT: Negative for congestion, ear pain, postnasal drip and sore throat. Eyes: Negative for pain and visual disturbance. Respiratory: Negative for cough, chest tightness, shortness of breath and wheezing. Cardiovascular: Negative for chest pain, palpitations, orthopnea and leg swelling. Gastrointestinal: Negative for abdominal pain, constipation and nausea. Genitourinary: Negative for dysuria and frequency. Musculoskeletal: Positive for back pain. Negative for arthralgias, joint swelling, neck pain and neck stiffness. Skin: Negative for rash. Neurological: Negative for dizziness, weakness, numbness and headaches. Hematological: Negative for adenopathy. Does not bruise/bleed easily. Psychiatric/Behavioral: Negative for behavioral problems and sleep disturbance. The patient is not nervous/anxious. /70 (Site: Right Upper Arm, Position: Sitting, Cuff Size: Medium Adult)   Pulse 72   Resp 10   Ht 5' 3\" (1.6 m)   Wt 113 lb 4 oz (51.4 kg)   BMI 20.06 kg/m²   Objective:   Physical Exam  Vitals signs and nursing note reviewed. Constitutional:       Appearance: She is well-developed. HENT:      Head: Normocephalic and atraumatic. Right Ear: External ear normal.      Left Ear: External ear normal.      Nose: Nose normal.   Eyes:      General: No scleral icterus. Conjunctiva/sclera: Conjunctivae normal.      Pupils: Pupils are equal, round, and reactive to light. Neck:      Musculoskeletal: Normal range of motion and neck supple. Thyroid: No thyromegaly. Vascular: No JVD. Cardiovascular:      Rate and Rhythm: Normal rate and regular rhythm. Heart sounds: Normal heart sounds. Pulmonary:      Effort: Pulmonary effort is normal.      Breath sounds: Normal breath sounds. No wheezing or rales. Abdominal:      General: Bowel sounds are normal. There is no distension. Palpations: Abdomen is soft. There is no mass. Tenderness: There is no tenderness. Musculoskeletal: Normal range of motion. General: No tenderness. Lymphadenopathy:      Cervical: No cervical adenopathy. Skin:     General: Skin is warm and dry. Findings: No rash. Neurological:      Mental Status: She is alert and oriented to person, place, and time.

## 2020-02-02 RX ORDER — GABAPENTIN 100 MG/1
CAPSULE ORAL
Qty: 270 CAPSULE | Refills: 0 | Status: SHIPPED | OUTPATIENT
Start: 2020-02-02 | End: 2020-04-01 | Stop reason: SDUPTHER

## 2020-02-07 RX ORDER — TRAMADOL HYDROCHLORIDE 50 MG/1
100 TABLET ORAL EVERY 8 HOURS PRN
Qty: 180 TABLET | Refills: 0 | Status: CANCELLED | OUTPATIENT
Start: 2020-02-07 | End: 2020-03-08

## 2020-02-07 NOTE — TELEPHONE ENCOUNTER
Neo Preciado called requesting a refill of the below medication which has been pended for you:     Requested Prescriptions     Pending Prescriptions Disp Refills    traMADol (ULTRAM) 50 MG tablet 180 tablet 0     Sig: Take 2 tablets by mouth every 8 hours as needed for Pain for up to 30 days.        Last Appointment Date: 1/10/2020  Next Appointment Date: 4/10/2020    Allergies   Allergen Reactions    Amoxicillin-Pot Clavulanate Diarrhea    Statins Support Therapy      Unaware of allery    Sulfa Antibiotics Nausea Only       Please send to :    Bev Schultz

## 2020-02-10 NOTE — TELEPHONE ENCOUNTER
Patient called inquiring about the Tramadol. Also noticed a request from the pharmacy sent to [facility] on 2/8.

## 2020-02-11 RX ORDER — TRAMADOL HYDROCHLORIDE 50 MG/1
100 TABLET ORAL EVERY 8 HOURS PRN
Qty: 132 TABLET | Refills: 0 | Status: SHIPPED | OUTPATIENT
Start: 2020-02-11 | End: 2020-03-03 | Stop reason: SDUPTHER

## 2020-02-11 NOTE — TELEPHONE ENCOUNTER
Reason I did not fill as was seeing pain management and was confused as I guess forgetting in conversation as if see is still going there as request with them also .  IF we fill she breaks her contract with pain management so did not fill

## 2020-02-11 NOTE — TELEPHONE ENCOUNTER
Patient is asking for med refill--- last seen 10/16/2019 and has appt 3/3/2020- she is on wait list but nothing sooner at this time. Are you ok to fill enough till appt? OARRS reviewed. UDS: + for  Gabapentin, Lorazepam,Tramadol . Narcan offered: yes  Last seen: 10/16/2019.  Follow-up: 03/03/2020

## 2020-03-01 RX ORDER — LORAZEPAM 0.5 MG/1
TABLET ORAL
Qty: 120 TABLET | Refills: 0 | Status: SHIPPED | OUTPATIENT
Start: 2020-03-01 | End: 2020-03-24 | Stop reason: SDUPTHER

## 2020-03-03 ENCOUNTER — OFFICE VISIT (OUTPATIENT)
Dept: PHYSICAL MEDICINE AND REHAB | Age: 85
End: 2020-03-03
Payer: MEDICARE

## 2020-03-03 VITALS
SYSTOLIC BLOOD PRESSURE: 120 MMHG | HEIGHT: 63 IN | HEART RATE: 80 BPM | WEIGHT: 112.43 LBS | DIASTOLIC BLOOD PRESSURE: 72 MMHG | BODY MASS INDEX: 19.92 KG/M2

## 2020-03-03 PROCEDURE — G8420 CALC BMI NORM PARAMETERS: HCPCS | Performed by: NURSE PRACTITIONER

## 2020-03-03 PROCEDURE — G8482 FLU IMMUNIZE ORDER/ADMIN: HCPCS | Performed by: NURSE PRACTITIONER

## 2020-03-03 PROCEDURE — G8427 DOCREV CUR MEDS BY ELIG CLIN: HCPCS | Performed by: NURSE PRACTITIONER

## 2020-03-03 PROCEDURE — 1036F TOBACCO NON-USER: CPT | Performed by: NURSE PRACTITIONER

## 2020-03-03 PROCEDURE — 1090F PRES/ABSN URINE INCON ASSESS: CPT | Performed by: NURSE PRACTITIONER

## 2020-03-03 PROCEDURE — 99213 OFFICE O/P EST LOW 20 MIN: CPT | Performed by: NURSE PRACTITIONER

## 2020-03-03 PROCEDURE — 1123F ACP DISCUSS/DSCN MKR DOCD: CPT | Performed by: NURSE PRACTITIONER

## 2020-03-03 PROCEDURE — 4040F PNEUMOC VAC/ADMIN/RCVD: CPT | Performed by: NURSE PRACTITIONER

## 2020-03-03 RX ORDER — TRAMADOL HYDROCHLORIDE 50 MG/1
100 TABLET ORAL EVERY 8 HOURS PRN
Qty: 180 TABLET | Refills: 0 | Status: SHIPPED | OUTPATIENT
Start: 2020-03-03 | End: 2020-03-25 | Stop reason: SDUPTHER

## 2020-03-03 ASSESSMENT — ENCOUNTER SYMPTOMS
COUGH: 0
NAUSEA: 1
CONSTIPATION: 1
PHOTOPHOBIA: 0
SORE THROAT: 0
SINUS PRESSURE: 0
DIARRHEA: 0
COLOR CHANGE: 0
WHEEZING: 0
CHEST TIGHTNESS: 0
SHORTNESS OF BREATH: 0
VOMITING: 0
EYE PAIN: 0
RHINORRHEA: 0
ABDOMINAL PAIN: 1
BACK PAIN: 1

## 2020-03-03 NOTE — PROGRESS NOTES
[facility]  [address]  Dept: [phone]  Dept Fax: [phone]: [phone]    Visit Date: 3/3/2020    Functionality Assessment/Goals Worksheet     On a scale of 0 (Does not Interfere) to 10 (Completely Interferes)     1. Which number describes how during the past week pain has interfered with       the following:  A. General Activity:  3  B. Mood: 0  C. Walking Ability:  4  D. Normal Work (Includes both work outside the home and housework):  4  E. Relations with Other People:   0  F. Sleep:   1  G. Enjoyment of Life:   1    2. Patient Prefers to Take their Pain Medications:     [x]  On a regular basis   []  Only when necessary    []  Does not take pain medications    3. What are the Patient's Goals/Expectations for Visiting Pain Management? []  Learn about my pain    [x]  Receive Medication   [x]  Physical Therapy     []  Treat Depression   [x]  Receive Injections    []  Treat Sleep   [x]  Deal with Anxiety and Stress   []  Treat Opoid Dependence/Addiction   []  Other:    HPI:   Janice Hunter is a 80 y.o. female is here today for    Chief Complaint: Low back pain, Mid back pain, Neck pain, Hip pain and Knee pain   Shoulder   HPI   F/U continues to have low back neck shoulder hip knee pain. She is not a surgical candidate for shoulder pain per Dr Leanna Price. She has had steroid shoulder injection in past with minimal relief. She continues to take Tramadol Neurontin and uses compound cream.       Medications reviewed. Patient denies side effects with medications. Patient states she is taking medications as prescribed. Shedenies receiving pain medications from other sources. She denies any ER visits since last visit. Pain scale with out pain medications or at its worst is 6/10. Pain scale with pain medications or at its best is 1/10.   Last dose of Tramadol  was  today  Drug screen reviewed from 10/16/2019  and was WNL  Pill count was not completed today and out  Patient does not have naloxone available at home. Patient has not required use of naloxone at home since last office visit. The patientis allergic to amoxicillin-pot clavulanate; statins support therapy; and sulfa antibiotics. Past Medical History  Jayro Souza  has a past medical history of Alcohol abuse, Anxiety, Colon polyp, Depression, Gastric ulcer, GERD (gastroesophageal reflux disease), Hyperlipidemia, Hypertension, Hypothyroid, Incisional hernia of anterior abdominal wall without obstruction or gangrene, Neuropathy, peripheral, Osteoarthritis, Osteoporosis, Thyroid disease, and Vitamin D deficiency. Past Surgical History  The patient  has a past surgical history that includes Cholecystectomy; joint replacement; hernia repair; Hemorrhoid surgery (); Endoscopy, colon, diagnostic (8-2014); eye surgery; Tonsillectomy; Colonoscopy; Upper gastrointestinal endoscopy; LAPAROTOMY EXPLORATORY (N/A, 11/29/2017); and Stomach surgery (11/2017). Family History  This patient's family history includes Bleeding Prob in her mother; Heart Attack in her father; Heart Disease in her father; Stroke in her mother. Social History  Jayro Souza  reports that she quit smoking about 57 years ago. She has never used smokeless tobacco. She reports that she does not drink alcohol or use drugs. Medications    Current Outpatient Medications:     traMADol (ULTRAM) 50 MG tablet, Take 2 tablets by mouth every 8 hours as needed for Pain for up to 30 days. , Disp: 180 tablet, Rfl: 0    mirabegron (MYRBETRIQ) 50 MG TB24, take 1 tablet by mouth once daily, Disp: 90 tablet, Rfl: 1    LORazepam (ATIVAN) 0.5 MG tablet, take 1 tablet by mouth every 6 hours if needed for anxiety, Disp: 120 tablet, Rfl: 0    gabapentin (NEURONTIN) 100 MG capsule, take 3 capsules by mouth at bedtime, Disp: 270 capsule, Rfl: 0    citalopram (CELEXA) 40 MG tablet,

## 2020-03-23 NOTE — TELEPHONE ENCOUNTER
Date of last visit:  1/10/2020  Date of next visit:  4/10/2020    Requested Prescriptions     Pending Prescriptions Disp Refills    LORazepam (ATIVAN) 0.5 MG tablet 120 tablet 0     Sig: take 1 tablet by mouth every 6 hours if needed for anxiety    citalopram (CELEXA) 40 MG tablet 45 tablet 1     Sig: take 1/2 tablet by mouth once daily    levothyroxine (SYNTHROID) 50 MCG tablet 90 tablet 1     Sig: take 1 tablet by mouth once daily

## 2020-03-24 RX ORDER — LORAZEPAM 0.5 MG/1
TABLET ORAL
Qty: 120 TABLET | Refills: 0 | Status: SHIPPED | OUTPATIENT
Start: 2020-03-24 | End: 2020-06-19 | Stop reason: SDUPTHER

## 2020-03-24 RX ORDER — LEVOTHYROXINE SODIUM 0.05 MG/1
TABLET ORAL
Qty: 90 TABLET | Refills: 1 | Status: SHIPPED | OUTPATIENT
Start: 2020-03-24 | End: 2020-09-15

## 2020-03-24 RX ORDER — CITALOPRAM 40 MG/1
TABLET ORAL
Qty: 45 TABLET | Refills: 1 | Status: SHIPPED | OUTPATIENT
Start: 2020-03-24 | End: 2020-08-25 | Stop reason: ALTCHOICE

## 2020-03-25 RX ORDER — TRAMADOL HYDROCHLORIDE 50 MG/1
100 TABLET ORAL EVERY 8 HOURS PRN
Qty: 180 TABLET | Refills: 0 | Status: SHIPPED | OUTPATIENT
Start: 2020-04-03 | End: 2020-05-03

## 2020-03-31 NOTE — TELEPHONE ENCOUNTER
Date of last visit:  1/10/2020  Date of next visit:  4/10/2020    Requested Prescriptions     Pending Prescriptions Disp Refills    gabapentin (NEURONTIN) 100 MG capsule 270 capsule 0     Sig: take 3 capsules by mouth at bedtime

## 2020-04-01 RX ORDER — GABAPENTIN 100 MG/1
CAPSULE ORAL
Qty: 270 CAPSULE | Refills: 0 | Status: SHIPPED | OUTPATIENT
Start: 2020-04-01 | End: 2020-04-08 | Stop reason: ALTCHOICE

## 2020-04-08 ENCOUNTER — OFFICE VISIT (OUTPATIENT)
Dept: FAMILY MEDICINE CLINIC | Age: 85
End: 2020-04-08

## 2020-04-08 PROCEDURE — 1036F TOBACCO NON-USER: CPT | Performed by: FAMILY MEDICINE

## 2020-04-08 PROCEDURE — 99213 OFFICE O/P EST LOW 20 MIN: CPT | Performed by: FAMILY MEDICINE

## 2020-04-08 PROCEDURE — G8427 DOCREV CUR MEDS BY ELIG CLIN: HCPCS | Performed by: FAMILY MEDICINE

## 2020-04-08 RX ORDER — GABAPENTIN 100 MG/1
CAPSULE ORAL
Qty: 270 CAPSULE | Refills: 0 | Status: CANCELLED | OUTPATIENT
Start: 2020-04-08 | End: 2020-07-05

## 2020-04-08 RX ORDER — GABAPENTIN 300 MG/1
300 CAPSULE ORAL NIGHTLY
Qty: 90 CAPSULE | Refills: 1 | Status: SHIPPED | OUTPATIENT
Start: 2020-04-08 | End: 2020-10-02 | Stop reason: SDUPTHER

## 2020-04-08 ASSESSMENT — ENCOUNTER SYMPTOMS
NAUSEA: 0
WHEEZING: 0
ABDOMINAL PAIN: 0
EYE PAIN: 0
SORE THROAT: 0
SHORTNESS OF BREATH: 0
CONSTIPATION: 1
BACK PAIN: 1
CHEST TIGHTNESS: 0
COUGH: 0

## 2020-04-08 NOTE — PROGRESS NOTES
Subjective:      Patient ID: Rufino Deleon is a 80 y.o. female. Patient verified Video Chat was not encrypted, and agrees to the Video Exam in presence of nurse.    nueropathy     Stable        Anxiety      Ativan   Am   And  qhs  For    Sleep       constipation          Urine  Ok     Thyroid  stabl e      Dec  2017   For    Bleeding  Ulcer     Back Pain   This is a chronic problem. The current episode started more than 1 year ago. The problem has been resolved since onset. The pain is present in the lumbar spine. The quality of the pain is described as aching. The pain is at a severity of 5/10. The symptoms are aggravated by bending, position and standing. Stiffness is present all day. Pertinent negatives include no abdominal pain, chest pain, dysuria, fever, headaches, numbness, pelvic pain or weakness. She has tried heat and analgesics (  tramadol  3  to 4 a  day a th  tyelenol ) for the symptoms. The treatment provided moderate relief. Past Medical History:   Diagnosis Date    Alcohol abuse sober since [place] polyp     Dr. Godfrey Boggs    Depression     Gastric ulcer 11/2017     nathanael     GERD (gastroesophageal reflux disease)     PUD 1998 Dr. Pascual Fonseca    Hyperlipidemia     Hypertension 12/3/2017    Hypothyroid     Incisional hernia of anterior abdominal wall without obstruction or gangrene     Neuropathy, peripheral     Osteoarthritis     Osteoporosis     Thyroid disease     hypothyroid    Vitamin D deficiency      Review of Systems   Constitutional: Negative for appetite change, fatigue and fever. HENT: Negative for congestion, ear pain, postnasal drip and sore throat. Eyes: Negative for pain and visual disturbance. Respiratory: Negative for cough, chest tightness, shortness of breath and wheezing. Cardiovascular: Negative for chest pain, palpitations and leg swelling. Gastrointestinal: Positive for constipation. Negative for abdominal pain and nausea. Genitourinary: Negative for dysuria, frequency and pelvic pain. Musculoskeletal: Positive for back pain. Negative for arthralgias, joint swelling, neck pain and neck stiffness. Skin: Negative for rash. Neurological: Negative for dizziness, weakness, numbness and headaches. Hematological: Negative for adenopathy. Does not bruise/bleed easily. Psychiatric/Behavioral: Negative for behavioral problems and sleep disturbance. The patient is not nervous/anxious. Objective:   Physical Exam  Constitutional:       Appearance: Normal appearance. Abdominal:      Comments:   Low  Back pain    Neurological:      Mental Status: She is alert and oriented to person, place, and time. Assessment:      .   Diagnosis Orders   1. Chronic midline low back pain without sciatica     2. Idiopathic peripheral neuropathy  gabapentin (NEURONTIN) 300 MG capsule   3. Senile osteoporosis     4. Age-related osteoporosis without current pathological fracture     5. Gastroesophageal reflux disease without esophagitis     6. Pure hypercholesterolemia     7. Essential hypertension     8. Hypothyroidism due to acquired atrophy of thyroid     9. Neurogenic bladder           Plan:      Current Outpatient Medications   Medication Sig Dispense Refill    gabapentin (NEURONTIN) 300 MG capsule Take 1 capsule by mouth nightly for 180 days. Intended supply: 90 days 90 capsule 1    traMADol (ULTRAM) 50 MG tablet Take 2 tablets by mouth every 8 hours as needed for Pain for up to 30 days.  180 tablet 0    LORazepam (ATIVAN) 0.5 MG tablet take 1 tablet by mouth every 6 hours if needed for anxiety 120 tablet 0    citalopram (CELEXA) 40 MG tablet take 1/2 tablet by mouth once daily 45 tablet 1    levothyroxine (SYNTHROID) 50 MCG tablet take 1 tablet by mouth once daily 90 tablet 1    mirabegron (MYRBETRIQ) 50 MG TB24 take 1 tablet by mouth once daily 90 tablet 1    pantoprazole (PROTONIX) 40 MG tablet take 1 tablet by mouth twice

## 2020-04-17 ENCOUNTER — VIRTUAL VISIT (OUTPATIENT)
Dept: FAMILY MEDICINE CLINIC | Age: 85
End: 2020-04-17

## 2020-04-17 PROCEDURE — 4040F PNEUMOC VAC/ADMIN/RCVD: CPT | Performed by: FAMILY MEDICINE

## 2020-04-17 PROCEDURE — G8427 DOCREV CUR MEDS BY ELIG CLIN: HCPCS | Performed by: FAMILY MEDICINE

## 2020-04-17 PROCEDURE — 1036F TOBACCO NON-USER: CPT | Performed by: FAMILY MEDICINE

## 2020-04-17 PROCEDURE — 1123F ACP DISCUSS/DSCN MKR DOCD: CPT | Performed by: FAMILY MEDICINE

## 2020-04-17 PROCEDURE — G8420 CALC BMI NORM PARAMETERS: HCPCS | Performed by: FAMILY MEDICINE

## 2020-04-17 PROCEDURE — 1090F PRES/ABSN URINE INCON ASSESS: CPT | Performed by: FAMILY MEDICINE

## 2020-04-17 PROCEDURE — 99213 OFFICE O/P EST LOW 20 MIN: CPT | Performed by: FAMILY MEDICINE

## 2020-04-17 RX ORDER — TIZANIDINE 2 MG/1
2 TABLET ORAL EVERY 6 HOURS PRN
Qty: 30 TABLET | Refills: 0 | Status: SHIPPED | OUTPATIENT
Start: 2020-04-17 | End: 2020-05-01 | Stop reason: SDUPTHER

## 2020-04-17 RX ORDER — PREDNISONE 20 MG/1
TABLET ORAL
Qty: 15 TABLET | Refills: 0 | Status: SHIPPED | OUTPATIENT
Start: 2020-04-17 | End: 2020-05-20 | Stop reason: ALTCHOICE

## 2020-04-17 ASSESSMENT — ENCOUNTER SYMPTOMS
EYE PAIN: 0
SHORTNESS OF BREATH: 0
SORE THROAT: 0
CONSTIPATION: 0
BACK PAIN: 1
COUGH: 0
CHEST TIGHTNESS: 0
WHEEZING: 0
NAUSEA: 0
ABDOMINAL PAIN: 0

## 2020-04-17 NOTE — PROGRESS NOTES
Subjective:      Patient ID: Deidre Whatley is a 80 y.o. female. Patient verified Video Chat was not encrypted, and agrees to the Video Exam in presence of nurse. HPI  Picked  Up a flower  Pot and with more pain  As  Pot  With  Pain and more   On  The  Left  Side  And  One  Week  Ago  Lifted       Past Medical History:   Diagnosis Date    Alcohol abuse sober since [place] [address] polyp     Dr. Brooklyn Scott    Depression     Gastric ulcer 11/2017     wisser     GERD (gastroesophageal reflux disease)     PUD 1998 Dr. Lance Sutton    Hyperlipidemia     Hypertension 12/3/2017    Hypothyroid     Incisional hernia of anterior abdominal wall without obstruction or gangrene     Neuropathy, peripheral     Osteoarthritis     Osteoporosis     Thyroid disease     hypothyroid    Vitamin D deficiency      Review of Systems   Constitutional: Negative for appetite change, fatigue and fever. HENT: Negative for congestion, ear pain, postnasal drip and sore throat. Eyes: Negative for pain and visual disturbance. Respiratory: Negative for cough, chest tightness, shortness of breath and wheezing. Cardiovascular: Negative for chest pain, palpitations and leg swelling. Gastrointestinal: Negative for abdominal pain, constipation and nausea. Genitourinary: Negative for dysuria and frequency. Musculoskeletal: Positive for back pain. Negative for arthralgias, joint swelling, neck pain and neck stiffness. Area  Of   t12  To  L1  Area    Skin: Negative for rash. Neurological: Negative for dizziness, weakness, numbness and headaches. Hematological: Negative for adenopathy. Does not bruise/bleed easily. Psychiatric/Behavioral: Negative for behavioral problems and sleep disturbance. The patient is not nervous/anxious. Objective:   Physical Exam  Constitutional:       General: She is not in acute distress. Appearance: Normal appearance. She is not ill-appearing.

## 2020-04-17 NOTE — PROGRESS NOTES
Rajat Hunter agreed to Video Chat/Exam in presence of Dr Rona Nails and myself. Verified who was present in room with Rajat Hunter [facility]). Rajat Hunter informed the e-mail address used to Face Time cannot be used to contact the Provider, if they are any questions or concerns they need to call the office directly. Rajat Hunter stated understanding.

## 2020-04-24 RX ORDER — PANTOPRAZOLE SODIUM 40 MG/1
TABLET, DELAYED RELEASE ORAL
Qty: 180 TABLET | Refills: 1 | Status: SHIPPED | OUTPATIENT
Start: 2020-04-24 | End: 2021-10-08 | Stop reason: SDUPTHER

## 2020-05-01 ENCOUNTER — TELEPHONE (OUTPATIENT)
Dept: FAMILY MEDICINE CLINIC | Age: 85
End: 2020-05-01

## 2020-05-01 NOTE — TELEPHONE ENCOUNTER
Pt called still having   Muscle aches in her neck, shoulders. Pt ask if she could get a refill of the   Tizanidine to see if this helps?     [address]

## 2020-05-03 RX ORDER — TIZANIDINE 2 MG/1
2 TABLET ORAL EVERY 6 HOURS PRN
Qty: 40 TABLET | Refills: 0 | Status: SHIPPED | OUTPATIENT
Start: 2020-05-03 | End: 2020-05-20 | Stop reason: ALTCHOICE

## 2020-05-04 ENCOUNTER — TELEPHONE (OUTPATIENT)
Dept: FAMILY MEDICINE CLINIC | Age: 85
End: 2020-05-04

## 2020-05-04 RX ORDER — NITROFURANTOIN 25; 75 MG/1; MG/1
100 CAPSULE ORAL 2 TIMES DAILY
Qty: 20 CAPSULE | Refills: 0 | Status: SHIPPED | OUTPATIENT
Start: 2020-05-04 | End: 2020-05-14

## 2020-05-04 NOTE — TELEPHONE ENCOUNTER
Pt called stating that since she started the muscle relaxer her stomach has been upset. She stated that it has been about a week now but over the weekend the stomach was worst. She is asking if there is something she can take to help her stomach or if she should stop the muscle relaxer.      [facility] on [address] may be reached at [phone]

## 2020-05-20 ENCOUNTER — OFFICE VISIT (OUTPATIENT)
Dept: FAMILY MEDICINE CLINIC | Age: 85
End: 2020-05-20

## 2020-05-20 VITALS
RESPIRATION RATE: 16 BRPM | SYSTOLIC BLOOD PRESSURE: 112 MMHG | HEIGHT: 60 IN | DIASTOLIC BLOOD PRESSURE: 64 MMHG | TEMPERATURE: 97.8 F | WEIGHT: 110.25 LBS | BODY MASS INDEX: 21.65 KG/M2 | HEART RATE: 84 BPM

## 2020-05-20 PROBLEM — D62 ACUTE BLOOD LOSS AS CAUSE OF POSTOPERATIVE ANEMIA: Status: RESOLVED | Noted: 2017-12-02 | Resolved: 2020-05-20

## 2020-05-20 PROBLEM — K66.8 PNEUMOPERITONEUM: Status: RESOLVED | Noted: 2017-11-29 | Resolved: 2020-05-20

## 2020-05-20 PROCEDURE — 1090F PRES/ABSN URINE INCON ASSESS: CPT | Performed by: FAMILY MEDICINE

## 2020-05-20 PROCEDURE — G8427 DOCREV CUR MEDS BY ELIG CLIN: HCPCS | Performed by: FAMILY MEDICINE

## 2020-05-20 PROCEDURE — 1123F ACP DISCUSS/DSCN MKR DOCD: CPT | Performed by: FAMILY MEDICINE

## 2020-05-20 PROCEDURE — 99213 OFFICE O/P EST LOW 20 MIN: CPT | Performed by: FAMILY MEDICINE

## 2020-05-20 PROCEDURE — G8420 CALC BMI NORM PARAMETERS: HCPCS | Performed by: FAMILY MEDICINE

## 2020-05-20 PROCEDURE — 4040F PNEUMOC VAC/ADMIN/RCVD: CPT | Performed by: FAMILY MEDICINE

## 2020-05-20 PROCEDURE — 1036F TOBACCO NON-USER: CPT | Performed by: FAMILY MEDICINE

## 2020-05-20 ASSESSMENT — ENCOUNTER SYMPTOMS
NAUSEA: 0
SORE THROAT: 0
EYE PAIN: 0
WHEEZING: 0
SHORTNESS OF BREATH: 0
BACK PAIN: 1
CHEST TIGHTNESS: 0
ABDOMINAL PAIN: 0
CONSTIPATION: 0
COUGH: 0

## 2020-05-21 ENCOUNTER — NURSE ONLY (OUTPATIENT)
Dept: FAMILY MEDICINE CLINIC | Age: 85
End: 2020-05-21

## 2020-05-21 LAB
BACTERIA URINE, POC: NORMAL
BILIRUBIN URINE: 0 MG/DL
BLOOD, URINE: NEGATIVE
CASTS URINE, POC: NORMAL
CLARITY: CLEAR
COLOR: YELLOW
CRYSTALS URINE, POC: NORMAL
EPI CELLS URINE, POC: NORMAL
GLUCOSE URINE: NEGATIVE
KETONES, URINE: NEGATIVE
LEUKOCYTE EST, POC: NEGATIVE
NITRITE, URINE: NEGATIVE
PH UA: 7.5 (ref 4.5–8)
PROTEIN UA: NEGATIVE
RBC URINE, POC: NORMAL
SPECIFIC GRAVITY UA: 1 (ref 1–1.03)
UROBILINOGEN, URINE: NORMAL
WBC URINE, POC: NORMAL
YEAST URINE, POC: NORMAL

## 2020-05-21 PROCEDURE — 81000 URINALYSIS NONAUTO W/SCOPE: CPT | Performed by: FAMILY MEDICINE

## 2020-06-02 ENCOUNTER — OFFICE VISIT (OUTPATIENT)
Dept: PHYSICAL MEDICINE AND REHAB | Age: 85
End: 2020-06-02
Payer: MEDICARE

## 2020-06-02 VITALS
DIASTOLIC BLOOD PRESSURE: 66 MMHG | BODY MASS INDEX: 21.6 KG/M2 | WEIGHT: 110 LBS | SYSTOLIC BLOOD PRESSURE: 122 MMHG | HEIGHT: 60 IN | TEMPERATURE: 97.8 F

## 2020-06-02 PROCEDURE — 1036F TOBACCO NON-USER: CPT | Performed by: NURSE PRACTITIONER

## 2020-06-02 PROCEDURE — 4040F PNEUMOC VAC/ADMIN/RCVD: CPT | Performed by: NURSE PRACTITIONER

## 2020-06-02 PROCEDURE — G8420 CALC BMI NORM PARAMETERS: HCPCS | Performed by: NURSE PRACTITIONER

## 2020-06-02 PROCEDURE — G8427 DOCREV CUR MEDS BY ELIG CLIN: HCPCS | Performed by: NURSE PRACTITIONER

## 2020-06-02 PROCEDURE — 1123F ACP DISCUSS/DSCN MKR DOCD: CPT | Performed by: NURSE PRACTITIONER

## 2020-06-02 PROCEDURE — 99213 OFFICE O/P EST LOW 20 MIN: CPT | Performed by: NURSE PRACTITIONER

## 2020-06-02 PROCEDURE — 1090F PRES/ABSN URINE INCON ASSESS: CPT | Performed by: NURSE PRACTITIONER

## 2020-06-02 RX ORDER — HYDROCODONE BITARTRATE AND ACETAMINOPHEN 5; 325 MG/1; MG/1
1 TABLET ORAL EVERY 6 HOURS PRN
Qty: 28 TABLET | Refills: 0 | Status: SHIPPED | OUTPATIENT
Start: 2020-06-02 | End: 2020-06-09

## 2020-06-02 ASSESSMENT — ENCOUNTER SYMPTOMS
COUGH: 0
PHOTOPHOBIA: 0
SHORTNESS OF BREATH: 0
CHEST TIGHTNESS: 0
NAUSEA: 1
VOMITING: 0
SINUS PRESSURE: 0
CONSTIPATION: 1
COLOR CHANGE: 0
RHINORRHEA: 0
DIARRHEA: 0
ABDOMINAL PAIN: 1
WHEEZING: 0
EYE PAIN: 0
BACK PAIN: 1
SORE THROAT: 0

## 2020-06-02 NOTE — PROGRESS NOTES
[facility]  [address]  Dept: [phone]  Dept Fax: [phone]: [phone]    Visit Date: 6/2/2020    Functionality Assessment/Goals Worksheet     On a scale of 0 (Does not Interfere) to 10 (Completely Interferes)     1. Which number describes how during the past week pain has interfered with       the following:  A. General Activity:  5  B. Mood: 0  C. Walking Ability:  7  D. Normal Work (Includes both work outside the home and housework):  8  E. Relations with Other People:   3  F. Sleep:   2  G. Enjoyment of Life:   2    2. Patient Prefers to Take their Pain Medications:     [x]  On a regular basis   []  Only when necessary    []  Does not take pain medications    3. What are the Patient's Goals/Expectations for Visiting Pain Management? []  Learn about my pain    [x]  Receive Medication   [x]  Physical Therapy     []  Treat Depression   [x]  Receive Injections    []  Treat Sleep   []  Deal with Anxiety and Stress   []  Treat Opoid Dependence/Addiction   []  Other:      HPI:   Alek Padgett is a 80 y.o. female is here today for    Chief Complaint: Low back pain, Mid back pain, Neck pain, Hip pain and Knee pain   Shoulder pain   HPI   F/U continues to have neck mid back low back hip knee pain and shoulder pain. She has tried injections in past with minimal relief. She continues to take Tramadol Neurontin and BIOMED cream. She would like to try a stronger narcotic. She has tried Norco in past but state sit made her legs restless but would try it again. Medications reviewed. Patient denies side effects with medications. Patient states she is taking medications as prescribed. Shedenies receiving pain medications from other sources. She denies any ER visits since last visit. Pain scale with out pain medications or at its worst is 9/10.   Pain scale with pain of motion, tenderness, bony tenderness, pain, spasm and decreased strength. Left shoulder: She exhibits normal range of motion, no tenderness and no pain. Right hip: She exhibits no tenderness. Left hip: She exhibits no tenderness. Right knee: She exhibits normal range of motion and no swelling. No tenderness found. Left knee: She exhibits normal range of motion and no swelling. No tenderness found. Right ankle: She exhibits decreased range of motion and swelling. Tenderness. Left ankle: She exhibits no swelling. Cervical back: She exhibits decreased range of motion and tenderness. Thoracic back: She exhibits tenderness. Lumbar back: She exhibits decreased range of motion, tenderness and bony tenderness. Right hand: She exhibits deformity and swelling. Left hand: She exhibits deformity and swelling. Right lower leg: She exhibits no swelling. Left lower leg: She exhibits no swelling. Legs:       Right foot: Decreased range of motion. Tenderness and bony tenderness present. Skin:     General: Skin is warm. Coloration: Skin is not pale. Findings: No erythema or rash. Neurological:      Mental Status: She is alert and oriented to person, place, and time. She is not disoriented. Cranial Nerves: Cranial nerves are intact. No cranial nerve deficit. Sensory: Sensation is intact. No sensory deficit. Motor: Weakness present. No atrophy or abnormal muscle tone. Coordination: Coordination is intact. Coordination normal.      Gait: Gait abnormal.      Deep Tendon Reflexes: Babinski sign absent on the right side. Babinski sign absent on the left side. Reflex Scores:       Tricep reflexes are 2+ on the right side and 2+ on the left side. Bicep reflexes are 2+ on the right side and 2+ on the left side. Brachioradialis reflexes are 2+ on the right side and 2+ on the left side.        Patellar reflexes are 1+ on the right side and 1+ on the left side. Achilles reflexes are 1+ on the right side and 1+ on the left side. Comments: Gait-cane   Psychiatric:         Attention and Perception: Attention and perception normal. She is attentive. Mood and Affect: Mood and affect normal. Mood is not anxious or depressed. Affect is not labile, blunt, angry or inappropriate. Speech: Speech normal.         Behavior: Behavior normal. Behavior is not agitated, slowed, aggressive, withdrawn, hyperactive or combative. Thought Content: Thought content normal. Thought content is not paranoid or delusional. Thought content does not include homicidal or suicidal ideation. Thought content does not include homicidal or suicidal plan. Cognition and Memory: Cognition and memory normal. Memory is not impaired. She does not exhibit impaired recent memory or impaired remote memory. Judgment: Judgment normal. Judgment is not impulsive or inappropriate. JOSELITO test:  Regis Lozada test:   Gaenslen test:     Assessment:     1. Therapeutic opioid-induced constipation (OIC)    2. Primary osteoarthritis involving multiple joints    3. Primary osteoarthritis of right shoulder    4. Primary osteoarthritis of both hands    5. Chronic midline low back pain without sciatica    6. Idiopathic peripheral neuropathy    7. Chronic pain syndrome            Plan:      · OARRS reviewed. Current MED:32  · Patient was offered naloxone for home. · Discussed long term side effects of medications, tolerance, dependency and addiction. · Previous UDS reviewed  · UDS preformed today for compliance. · Patient told can not receive any pain medications from any other source. · No evidence of abuse, diversion or aberrant behavior.    Medications and/or procedures to improve function and quality of life- patient understanding with this and that may not be pain free   Discussed with patient about safe storage of medications at home   Discussed possible weaning of medication dosing dependent on treatment/procedure results.  Testing:none    Procedures: None   Discussed with patient about risks with procedure including infection, reaction to medication, increased pain, or bleeding.  Medications:Neurontin Norco trial, may 1/2 tab if wants and continue BIOMED cream, Movantik trial for OIC      Meds. Prescribed:   Orders Placed This Encounter   Medications    HYDROcodone-acetaminophen (NORCO) 5-325 MG per tablet     Sig: Take 1 tablet by mouth every 6 hours as needed for Pain for up to 7 days. May take 1/2tab or 1 tab prn q 6 hrs     Dispense:  28 tablet     Refill:  0     Reduce doses taken as pain becomes manageable    naloxegol (MOVANTIK) 12.5 MG TABS tablet     Sig: Take 1 tablet by mouth every morning     Dispense:  90 tablet     Refill:  2       Return 6-8 weeks, for Follow up pain medications. Time spent with patient was  15  minutes, more than 50% was spent Counseling/coordinated the patient'scare.       Electronically signed by KARTHIKEYAN Villanueva CNP on6/2/2020 at 4:38 PM

## 2020-06-04 ENCOUNTER — TELEPHONE (OUTPATIENT)
Dept: PHYSICAL MEDICINE AND REHAB | Age: 85
End: 2020-06-04

## 2020-06-05 RX ORDER — TRAMADOL HYDROCHLORIDE 50 MG/1
100 TABLET ORAL EVERY 8 HOURS PRN
Qty: 180 TABLET | Refills: 0 | Status: SHIPPED | OUTPATIENT
Start: 2020-06-05 | End: 2020-07-13 | Stop reason: SDUPTHER

## 2020-06-05 NOTE — TELEPHONE ENCOUNTER
Patient called stating that she is not able to take the medication because it is causing her restless leg synrodme and did not help with the pain. Patient was up all night. Patient says that Howard Anderson, [facility] said she would write another script for Tramadol and put the patient back on that. Okay to send in refill of tramadol, she will be out this weekend she says? Please advise. OARRS reviewed. UDS: + for  Gabapentin, Tramadol consistent. Narcan offered: Offered  Last seen: 6/2/2020. Follow-up: 07/28/2020  Tramadol filled on 4/6/2020. [facility] on [address].

## 2020-06-19 PROBLEM — H35.3110 NONEXUDATIVE AGE-RELATED MACULAR DEGENERATION OF RIGHT EYE: Status: ACTIVE | Noted: 2018-03-13

## 2020-06-19 RX ORDER — LORAZEPAM 0.5 MG/1
TABLET ORAL
Qty: 120 TABLET | Refills: 0 | Status: SHIPPED | OUTPATIENT
Start: 2020-06-19 | End: 2020-08-20 | Stop reason: SDUPTHER

## 2020-06-19 NOTE — TELEPHONE ENCOUNTER
Date of last visit:  5/20/2020  Date of next visit:  8/20/2020    Requested Prescriptions     Pending Prescriptions Disp Refills    LORazepam (ATIVAN) 0.5 MG tablet 120 tablet 0     Sig: take 1 tablet by mouth every 6 hours if needed for anxiety

## 2020-07-13 RX ORDER — TRAMADOL HYDROCHLORIDE 50 MG/1
100 TABLET ORAL EVERY 8 HOURS PRN
Qty: 180 TABLET | Refills: 0 | Status: SHIPPED | OUTPATIENT
Start: 2020-07-13 | End: 2020-08-31 | Stop reason: SDUPTHER

## 2020-07-13 NOTE — TELEPHONE ENCOUNTER
Jerrica العراقي called requesting a refill on the following medications:  Requested Prescriptions     Pending Prescriptions Disp Refills    traMADol (ULTRAM) 50 MG tablet 180 tablet 0     Sig: Take 2 tablets by mouth every 8 hours as needed for Pain for up to 30 days. [facility] pv      Date of last visit: 6/2/20  Date of next visit (if applicable): 6/46/3182

## 2020-07-15 ENCOUNTER — TELEPHONE (OUTPATIENT)
Dept: PHYSICAL MEDICINE AND REHAB | Age: 85
End: 2020-07-15

## 2020-07-15 RX ORDER — METHYLPREDNISOLONE 4 MG/1
TABLET ORAL
Qty: 1 KIT | Refills: 0 | Status: SHIPPED | OUTPATIENT
Start: 2020-07-15 | End: 2020-07-21

## 2020-07-15 NOTE — TELEPHONE ENCOUNTER
Patient called stating that she is having severe, stabbing pain to the hip. She thinks is might be worsening arthritis. Although the tramadol and tylenol is not covering the pain. Please advise.

## 2020-07-28 ENCOUNTER — OFFICE VISIT (OUTPATIENT)
Dept: PHYSICAL MEDICINE AND REHAB | Age: 85
End: 2020-07-28
Payer: MEDICARE

## 2020-07-28 VITALS
SYSTOLIC BLOOD PRESSURE: 98 MMHG | HEIGHT: 60 IN | DIASTOLIC BLOOD PRESSURE: 60 MMHG | TEMPERATURE: 97 F | WEIGHT: 110 LBS | BODY MASS INDEX: 21.6 KG/M2

## 2020-07-28 PROCEDURE — 4040F PNEUMOC VAC/ADMIN/RCVD: CPT | Performed by: NURSE PRACTITIONER

## 2020-07-28 PROCEDURE — 1123F ACP DISCUSS/DSCN MKR DOCD: CPT | Performed by: NURSE PRACTITIONER

## 2020-07-28 PROCEDURE — 1036F TOBACCO NON-USER: CPT | Performed by: NURSE PRACTITIONER

## 2020-07-28 PROCEDURE — G8427 DOCREV CUR MEDS BY ELIG CLIN: HCPCS | Performed by: NURSE PRACTITIONER

## 2020-07-28 PROCEDURE — 1090F PRES/ABSN URINE INCON ASSESS: CPT | Performed by: NURSE PRACTITIONER

## 2020-07-28 PROCEDURE — 99213 OFFICE O/P EST LOW 20 MIN: CPT | Performed by: NURSE PRACTITIONER

## 2020-07-28 PROCEDURE — G8420 CALC BMI NORM PARAMETERS: HCPCS | Performed by: NURSE PRACTITIONER

## 2020-07-28 ASSESSMENT — ENCOUNTER SYMPTOMS
CONSTIPATION: 1
ABDOMINAL PAIN: 1
EYE PAIN: 0
PHOTOPHOBIA: 0
COLOR CHANGE: 0
SHORTNESS OF BREATH: 0
RHINORRHEA: 0
NAUSEA: 1
VOMITING: 0
DIARRHEA: 0
SINUS PRESSURE: 0
WHEEZING: 0
BACK PAIN: 1
SORE THROAT: 0
CHEST TIGHTNESS: 0
COUGH: 0

## 2020-07-28 NOTE — PROGRESS NOTES
[address] [facility], [place]  [address]  Dept: [phone]  Dept Fax: [phone]: [phone]    Visit Date: 7/28/2020    Functionality Assessment/Goals Worksheet     On a scale of 0 (Does not Interfere) to 10 (Completely Interferes)     1. Which number describes how during the past week pain has interfered with       the following:  A. General Activity:  4  B. Mood: 2  C. Walking Ability:  4  D. Normal Work (Includes both work outside the home and housework):  8  E. Relations with Other People:   1  F. Sleep:   1  G. Enjoyment of Life:   2    2. Patient Prefers to Take their Pain Medications:     [x]  On a regular basis   []  Only when necessary    []  Does not take pain medications    3. What are the Patient's Goals/Expectations for Visiting Pain Management? [x]  Learn about my pain    []  Receive Medication   [x]  Physical Therapy     []  Treat Depression   [x]  Receive Injections    []  Treat Sleep   []  Deal with Anxiety and Stress   []  Treat Opoid Dependence/Addiction   [x]  Other:   Joint pain    HPI:   Aydee Richards is a 80 y.o. female is here today for    Chief Complaint: Low back pain, Hip pain and Knee pain  Si pain     HPI   F/U conitnues to have low back hip SI and knee pain. She called office with increased right hip pain after twisting. Was given medrol dose pack. Doing 90% better now. She continues to take Tramadol Neurontin Tylenol when need. Medications reviewed. Patient denies side effects with medications. Patient states she is taking medications as prescribed. Shedenies receiving pain medications from other sources. She denies any ER visits since last visit. Pain scale with out pain medications or at its worst is 8/10. Pain scale with pain medications or at its best is 3/10.   Last dose of Tramadol   was today   LAST UDS WNL     Right hip XR 2017  FINDINGS: No acute fracture or bone destruction. Hip joint space is minimally narrowed. Bones are osteopenic. There are mild degenerative changes noted involving the acetabular rim, greater trochanter, and right SI joint. Vascular calcifications are    present. No acute soft tissue abnormality is seen.              Impression    Mild degenerative change. No acute abnormality.             The patientis allergic to amoxicillin-pot clavulanate; statins support therapy; and sulfa antibiotics. Past Medical History  Eugenia Dutton  has a past medical history of Acute blood loss as cause of postoperative anemia, Alcohol abuse, Anxiety, Colon polyp, Depression, Gastric ulcer, GERD (gastroesophageal reflux disease), Hyperlipidemia, Hypertension, Hypothyroid, Incisional hernia of anterior abdominal wall without obstruction or gangrene, Neuropathy, peripheral, Osteoarthritis, Osteoporosis, Pneumoperitoneum, Thyroid disease, and Vitamin D deficiency. Past Surgical History  The patient  has a past surgical history that includes Cholecystectomy; joint replacement; hernia repair; Hemorrhoid surgery (); Endoscopy, colon, diagnostic (8-2014); eye surgery; Tonsillectomy; Colonoscopy; Upper gastrointestinal endoscopy; LAPAROTOMY EXPLORATORY (N/A, 11/29/2017); and Stomach surgery (11/2017). Family History  This patient's family history includes Bleeding Prob in her mother; Heart Attack in her father; Heart Disease in her father; Stroke in her mother. Social History  Eugenia Dutton  reports that she quit smoking about 57 years ago. She has never used smokeless tobacco. She reports that she does not drink alcohol or use drugs. Medications    Current Outpatient Medications:     traMADol (ULTRAM) 50 MG tablet, Take 2 tablets by mouth every 8 hours as needed for Pain for up to 30 days. , Disp: 180 tablet, Rfl: 0    LORazepam (ATIVAN) 0.5 MG tablet, take 1 tablet by mouth every 6 hours if needed for anxiety, Disp: 120 tablet, Rfl: 0   naloxegol (MOVANTIK) 12.5 MG TABS tablet, Take 1 tablet by mouth every morning, Disp: 90 tablet, Rfl: 2    pantoprazole (PROTONIX) 40 MG tablet, take 1 tablet by mouth twice daily 30 MINUTES PRIOR TO AM MEAL AND SUPPER, Disp: 180 tablet, Rfl: 1    gabapentin (NEURONTIN) 300 MG capsule, Take 1 capsule by mouth nightly for 180 days. Intended supply: 90 days, Disp: 90 capsule, Rfl: 1    citalopram (CELEXA) 40 MG tablet, take 1/2 tablet by mouth once daily, Disp: 45 tablet, Rfl: 1    levothyroxine (SYNTHROID) 50 MCG tablet, take 1 tablet by mouth once daily, Disp: 90 tablet, Rfl: 1    mirabegron (MYRBETRIQ) 50 MG TB24, take 1 tablet by mouth once daily, Disp: 90 tablet, Rfl: 1    Multiple Vitamins-Minerals (PRESERVISION/LUTEIN) CAPS, Take 1 capsule by mouth 2 times daily. , Disp: , Rfl:     Subjective:      Review of Systems   Constitutional: Positive for activity change. Negative for appetite change, chills, diaphoresis, fatigue, fever and unexpected weight change. HENT: Negative for congestion, ear pain, hearing loss, mouth sores, nosebleeds, rhinorrhea, sinus pressure and sore throat. Eyes: Negative for photophobia, pain and visual disturbance. Respiratory: Negative for cough, chest tightness, shortness of breath and wheezing. Cardiovascular: Negative for chest pain and palpitations. Gastrointestinal: Positive for abdominal pain, constipation and nausea. Negative for diarrhea and vomiting. Hx Hernia  Following  GI for abdominal pain nausea   Endocrine: Negative for cold intolerance, heat intolerance, polydipsia, polyphagia and polyuria. Genitourinary: Negative for decreased urine volume, difficulty urinating, frequency and hematuria. Musculoskeletal: Positive for arthralgias, back pain, joint swelling and myalgias. Negative for gait problem, neck pain and neck stiffness. Skin: Negative for color change and rash.    Allergic/Immunologic: Negative for food allergies and immunocompromised state.   Neurological: Negative for dizziness, tremors, seizures, syncope, facial asymmetry, speech difficulty, weakness, light-headedness, numbness and headaches. Hematological: Does not bruise/bleed easily. Psychiatric/Behavioral: Negative for agitation, behavioral problems, confusion, decreased concentration, dysphoric mood, hallucinations, self-injury, sleep disturbance and suicidal ideas. The patient is nervous/anxious. The patient is not hyperactive. Objective:     Vitals:    07/28/20 1545   BP: 98/60   Temp: 97 °F (36.1 °C)   Weight: 110 lb (49.9 kg)   Height: 5' (1.524 m)       Physical Exam  Vitals signs and nursing note reviewed. Constitutional:       General: She is not in acute distress. Appearance: She is not diaphoretic. Comments: Elderly fragile female   HENT:      Head: Normocephalic and atraumatic. Right Ear: External ear normal.      Left Ear: External ear normal.      Nose: Nose normal.      Mouth/Throat:      Pharynx: No oropharyngeal exudate. Eyes:      General: No scleral icterus. Right eye: No discharge. Left eye: No discharge. Conjunctiva/sclera: Conjunctivae normal.      Pupils: Pupils are equal, round, and reactive to light. Neck:      Musculoskeletal: Full passive range of motion without pain, normal range of motion and neck supple. Normal range of motion. No edema, erythema, neck rigidity or muscular tenderness. Thyroid: No thyromegaly. Cardiovascular:      Rate and Rhythm: Normal rate and regular rhythm. Heart sounds: Normal heart sounds. No murmur. No friction rub. No gallop. Pulmonary:      Effort: Pulmonary effort is normal. No respiratory distress. Breath sounds: Normal breath sounds. No wheezing or rales. Chest:      Chest wall: No tenderness. Abdominal:      General: Bowel sounds are normal. There is no distension. Palpations: Abdomen is soft. Tenderness: There is no abdominal tenderness.  There is Brachioradialis reflexes are 2+ on the right side and 2+ on the left side. Patellar reflexes are 1+ on the right side and 1+ on the left side. Achilles reflexes are 1+ on the right side and 1+ on the left side. Comments: Gait-cane   Psychiatric:         Attention and Perception: Attention and perception normal. She is attentive. Mood and Affect: Mood and affect normal. Mood is not anxious or depressed. Affect is not labile, blunt, angry or inappropriate. Speech: Speech normal.         Behavior: Behavior normal. Behavior is not agitated, slowed, aggressive, withdrawn, hyperactive or combative. Thought Content: Thought content normal. Thought content is not paranoid or delusional. Thought content does not include homicidal or suicidal ideation. Thought content does not include homicidal or suicidal plan. Cognition and Memory: Cognition and memory normal. Memory is not impaired. She does not exhibit impaired recent memory or impaired remote memory. Judgment: Judgment normal. Judgment is not impulsive or inappropriate. JOSELITO test: +  Yeomans test: +  Gaenslen test: +     Assessment:     1. Primary osteoarthritis of right shoulder    2. Primary osteoarthritis involving multiple joints    3. Primary osteoarthritis of both hands    4. Therapeutic opioid-induced constipation (OIC)    5. Idiopathic peripheral neuropathy    6. Hip strain, right, initial encounter    7. Chronic midline low back pain without sciatica    8. Bursitis of other bursa of right hip    9. Chronic pain syndrome    10. SI (sacroiliac) joint inflammation ([facility] [address]            Plan:      · OARRS reviewed. Current MED: 30  · Patient was offered naloxone for home. · Discussed long term side effects of medications, tolerance, dependency and addiction. · Previous UDS reviewed  · UDS preformed today for compliance. · Patient told can not receive any pain medications from any other source.   · No evidence of abuse, diversion or aberrant behavior.  Medications and/or procedures to improve function and quality of life- patient understanding with this and that may not be pain free   Discussed with patient about safe storage of medications at home   Discussed possible weaning of medication dosing dependent on treatment/procedure results.  Testing: reviewed right hip XR   Procedures: Discussed SI  MBB right hip steroid injection    Discussed with patient about risks with procedure including infection, reaction to medication, increased pain, or bleeding.  Medications:Tramadol Neurontin       Meds. Prescribed:   No orders of the defined types were placed in this encounter. Return in about 3 months (around 10/28/2020) for Follow up pain medications. Time spent with patient was 25 minutes, more than 50% was spent Counseling/coordinated the patient'scare.       Electronically signed by KARTHIKEYAN Navarro CNP on7/28/2020 at 4:08 PM

## 2020-07-30 ENCOUNTER — TELEPHONE (OUTPATIENT)
Dept: FAMILY MEDICINE CLINIC | Age: 85
End: 2020-07-30

## 2020-07-30 NOTE — TELEPHONE ENCOUNTER
Pt called and said that she would like a referral for Physical Therapy to [facility] on [address] Please call when done so pt can schedule. She said that the therapy is for her hip.

## 2020-08-19 NOTE — TELEPHONE ENCOUNTER
Date of last visit:  5/20/2020  Date of next visit:  8/25/2020    Requested Prescriptions     Pending Prescriptions Disp Refills    LORazepam (ATIVAN) 0.5 MG tablet 120 tablet 0     Sig: take 1 tablet by mouth every 6 hours if needed for anxiety

## 2020-08-19 NOTE — TELEPHONE ENCOUNTER
Celio Hyman called requesting a refill of their:    LORazepam (ATIVAN) 0.5 MG tablet take 1 tablet by mouth every 6 hours if needed for anxiety    Send to [facility] on [address]

## 2020-08-20 RX ORDER — LORAZEPAM 0.5 MG/1
TABLET ORAL
Qty: 120 TABLET | Refills: 0 | Status: SHIPPED | OUTPATIENT
Start: 2020-08-20 | End: 2020-08-25 | Stop reason: DRUGHIGH

## 2020-08-25 ENCOUNTER — OFFICE VISIT (OUTPATIENT)
Dept: FAMILY MEDICINE CLINIC | Age: 85
End: 2020-08-25

## 2020-08-25 VITALS
TEMPERATURE: 97.8 F | DIASTOLIC BLOOD PRESSURE: 66 MMHG | RESPIRATION RATE: 16 BRPM | SYSTOLIC BLOOD PRESSURE: 110 MMHG | BODY MASS INDEX: 21.87 KG/M2 | HEIGHT: 60 IN | WEIGHT: 111.38 LBS | HEART RATE: 80 BPM

## 2020-08-25 PROCEDURE — 4040F PNEUMOC VAC/ADMIN/RCVD: CPT | Performed by: FAMILY MEDICINE

## 2020-08-25 PROCEDURE — 1123F ACP DISCUSS/DSCN MKR DOCD: CPT | Performed by: FAMILY MEDICINE

## 2020-08-25 PROCEDURE — G0438 PPPS, INITIAL VISIT: HCPCS | Performed by: FAMILY MEDICINE

## 2020-08-25 RX ORDER — PAROXETINE HYDROCHLORIDE 20 MG/1
20 TABLET, FILM COATED ORAL DAILY
Qty: 90 TABLET | Refills: 0 | Status: SHIPPED | OUTPATIENT
Start: 2020-08-25 | End: 2020-11-21

## 2020-08-25 RX ORDER — LORAZEPAM 0.5 MG/1
TABLET ORAL
Qty: 120 TABLET | Refills: 0 | Status: SHIPPED
Start: 2020-08-25 | End: 2020-10-02 | Stop reason: SDUPTHER

## 2020-08-25 ASSESSMENT — PATIENT HEALTH QUESTIONNAIRE - PHQ9
SUM OF ALL RESPONSES TO PHQ QUESTIONS 1-9: 0
SUM OF ALL RESPONSES TO PHQ QUESTIONS 1-9: 0

## 2020-08-25 ASSESSMENT — ENCOUNTER SYMPTOMS
CONSTIPATION: 0
SHORTNESS OF BREATH: 0
COUGH: 0
CHEST TIGHTNESS: 0
EYE PAIN: 0
ABDOMINAL PAIN: 0
WHEEZING: 0
SORE THROAT: 0
NAUSEA: 0

## 2020-08-25 ASSESSMENT — LIFESTYLE VARIABLES: HOW OFTEN DO YOU HAVE A DRINK CONTAINING ALCOHOL: 0

## 2020-08-25 NOTE — PROGRESS NOTES
Hypothyroid     Incisional hernia of anterior abdominal wall without obstruction or gangrene     Neuropathy, peripheral     Osteoarthritis     Osteoporosis     Pneumoperitoneum 11/29/2017    Thyroid disease     hypothyroid    Vitamin D deficiency        Past Surgical History:   Procedure Laterality Date    CHOLECYSTECTOMY      [facility]      [address]    ENDOSCOPY, COLON[facility] DIAGNOSTIC  8-2014    [facility]    EYE SURGERY      HEMORRHOID SURGERY      HERNIA REPAIR      left    JOINT REPLACEMENT      bilat knees. Dr. Viktoriya Manzo N/A 11/29/2017    EXPLORATORY LAPAROTOMY, PROBABLE ULCER REPAIR performed by Liberty Umana DO at [address] [facility]  11/2017    ulcer  repair   wisser     TONSILLECTOMY      UPPER GASTROINTESTINAL ENDOSCOPY      2011  [facility]       Family History   Problem Relation Age of Onset    Bleeding Prob Mother     Stroke Mother     Heart Attack Father     Heart Disease Father        CareTeam (Including outside providers/suppliers regularly involved in providing care):   Patient Care Team:  Jama Ovalles MD as PCP - General (Family Medicine)  Jama Ovalles MD as PCP - [facility]aneled Provider    Wt Readings from Last 3 Encounters:   08/25/20 111 lb 6 oz (50.5 kg)   07/28/20 110 lb (49.9 kg)   06/02/20 110 lb (49.9 kg)     Vitals:    08/25/20 1525   BP: 110/66   Site: Right Upper Arm   Position: Sitting   Cuff Size: Medium Adult   Pulse: 80   Resp: 16   Temp: 97.8 °F (36.6 °C)   TempSrc: Oral   Weight: 111 lb 6 oz (50.5 kg)   Height: 5' (1.524 m)     Body mass index is 21.75 kg/m². Based upon direct observation of the patient, evaluation of cognition reveals recent and remote memory intact. Memory  Loss         Patient's complete Health Risk Assessment and screening values have been reviewed and are found in Flowsheets.  The following problems were reviewed today and where indicated follow up appointments were made and/or referrals ordered. Positive Risk Factor Screenings with Interventions:     Health Habits/Nutrition:  Health Habits/Nutrition  Do you exercise for at least 20 minutes 2-3 times per week?: Yes  Have you lost any weight without trying in the past 3 months?: No  Do you eat fewer than 2 meals per day?: No  Have you seen a dentist within the past year?: (!) No  Body mass index is 21.75 kg/m². Health Habits/Nutrition Interventions:  Has  Dental   Appointment     Hearing/Vision:  No exam data present  Hearing/Vision  Do you or your family notice any trouble with your hearing?: (!) Yes  Do you have difficulty driving, watching TV, or doing any of your daily activities because of your eyesight?: No  Have you had an eye exam within the past year?: Yes  Hearing/Vision Interventions:  ·   Hearing test  Needed   But  Actually does  Well     ADL:  ADLs  In the past 7 days, did you need help from others to perform any of the following everyday activities? Eating, dressing, grooming, bathing, toileting, or walking/balance?: None  In the past 7 days, did you need help from others to take care of any of the following?  Laundry, housekeeping, banking/finances, shopping, telephone use, food preparation, transportation, or taking medications?: [employer], Transportation  ADL Interventions:  · has  help  with  family    Personalized Preventive Plan   Current Health Maintenance Status  Immunization History   Administered Date(s) Administered    Influenza 10/16/2012, 10/22/2013    Influenza Virus Vaccine 10/09/2014, 11/24/2015    Influenza, MDCK Quadv, IM, PF (Flucelvax 4 yrs and older) 11/07/2017    Influenza, Sophie Like, 6 mo and older, IM (Fluzone, Flulaval) 10/19/2018    Influenza, Quadv, IM, (6 mo and older Fluzone, Flulaval, Fluarix and 3 yrs and older Afluria) 10/11/2019    Influenza, Quadv, IM, PF (6 mo and older Fluzone, Flulaval, Fluarix, and 3 yrs and older Afluria) 10/12/2016    Pneumococcal Conjugate 13-valent Exam  Vitals signs and nursing note reviewed. Constitutional:       Appearance: She is well-developed. HENT:      Head: Normocephalic and atraumatic. Right Ear: External ear normal.      Left Ear: External ear normal.      Nose: Nose normal.   Eyes:      General: No scleral icterus. Conjunctiva/sclera: Conjunctivae normal.      Pupils: Pupils are equal, round, and reactive to light. Neck:      Musculoskeletal: Normal range of motion and neck supple. Thyroid: No thyromegaly. Vascular: No JVD. Cardiovascular:      Rate and Rhythm: Normal rate and regular rhythm. Heart sounds: Normal heart sounds. Pulmonary:      Effort: Pulmonary effort is normal.      Breath sounds: Normal breath sounds. No wheezing or rales. Abdominal:      General: Bowel sounds are normal. There is no distension. Palpations: Abdomen is soft. There is no mass. Tenderness: There is no abdominal tenderness. Musculoskeletal: Normal range of motion. General: No tenderness. Comments:   Right low  Back and  si    Joint  Pain  Noted    Lymphadenopathy:      Cervical: No cervical adenopathy. Skin:     General: Skin is warm and dry. Findings: No rash. Neurological:      Mental Status: She is alert and oriented to person, place, and time. Cranial Nerves: No cranial nerve deficit. Deep Tendon Reflexes: Reflexes are normal and symmetric. ASSESSMENT/PLAN:    Current Outpatient Medications   Medication Sig Dispense Refill    LORazepam (ATIVAN) 0.5 MG tablet take 1 tablet by mouth every 6 hours if needed for anxiety 120 tablet 0    PARoxetine (PAXIL) 20 MG tablet Take 1 tablet by mouth daily 90 tablet 0    pantoprazole (PROTONIX) 40 MG tablet take 1 tablet by mouth twice daily 30 MINUTES PRIOR TO AM MEAL AND SUPPER 180 tablet 1    gabapentin (NEURONTIN) 300 MG capsule Take 1 capsule by mouth nightly for 180 days.  Intended supply: 90 days 90 capsule 1    levothyroxine (SYNTHROID) 50 MCG tablet take 1 tablet by mouth once daily 90 tablet 1    mirabegron (MYRBETRIQ) 50 MG TB24 take 1 tablet by mouth once daily 90 tablet 1    Multiple Vitamins-Minerals (PRESERVISION/LUTEIN) CAPS Take 1 capsule by mouth 2 times daily. No current facility-administered medications for this visit. No orders of the defined types were placed in this encounter. paxil  In  Place  Of  The    celexa   And  Do the  Ativan   Tid       Therapy  With  Help  To  The  back  No follow-ups on file. An electronic signature was used to authenticate this note.     --Kris Delacruz MD on 8/25/2020 at 3:48 PM

## 2020-08-25 NOTE — PATIENT INSTRUCTIONS
Personalized Preventive Plan for Solitario Eason - 8/25/2020  Medicare offers a range of preventive health benefits. Some of the tests and screenings are paid in full while other may be subject to a deductible, co-insurance, and/or copay. Some of these benefits include a comprehensive review of your medical history including lifestyle, illnesses that may run in your family, and various assessments and screenings as appropriate. After reviewing your medical record and screening and assessments performed today your provider may have ordered immunizations, labs, imaging, and/or referrals for you. A list of these orders (if applicable) as well as your Preventive Care list are included within your After Visit Summary for your review. Other Preventive Recommendations:    · A preventive eye exam performed by an eye specialist is recommended every 1-2 years to screen for glaucoma; cataracts, macular degeneration, and other eye disorders. · A preventive dental visit is recommended every 6 months. · Try to get at least 150 minutes of exercise per week or 10,000 steps per day on a pedometer . · Order or download the FREE \"Exercise & Physical Activity: Your Everyday Guide\" from [employer]. Call 9-753.569.4411 or search [employer] online. · You need 2839-3697 mg of calcium and 5915-2772 IU of vitamin D per day. It is possible to meet your calcium requirement with diet alone, but a vitamin D supplement is usually necessary to meet this goal.  · When exposed to the sun, use a sunscreen that protects against both UVA and UVB radiation with an SPF of 30 or greater. Reapply every 2 to 3 hours or after sweating, drying off with a towel, or swimming. · Always wear a seat belt when traveling in a car. Always wear a helmet when riding a bicycle or motorcycle.

## 2020-08-28 NOTE — TELEPHONE ENCOUNTER
Cari Caldera called requesting a refill on the following medications:  Requested Prescriptions     Pending Prescriptions Disp Refills    traMADol (ULTRAM) 50 MG tablet 180 tablet 0     Sig: Take 2 tablets by mouth every 8 hours as needed for Pain for up to 30 days.      Pharmacy verified: [email or web address]    Date of last visit: 7/28/20  Date of next visit (if applicable): 59/98/7674

## 2020-08-31 RX ORDER — TRAMADOL HYDROCHLORIDE 50 MG/1
100 TABLET ORAL EVERY 8 HOURS PRN
Qty: 180 TABLET | Refills: 0 | Status: SHIPPED | OUTPATIENT
Start: 2020-08-31 | End: 2020-10-12 | Stop reason: SDUPTHER

## 2020-08-31 NOTE — TELEPHONE ENCOUNTER
OARRS reviewed. UDS: + for  Gabapentin, tramadol, lorazepam -consistent     Narcan offered: offered      Last seen: 7/28/2020.      Follow-up:   Future Appointments   Date Time Provider Singh Hoang   10/28/2020  4:00 PM [facility], APRN - CNP [facility] [place]   12/1/2020  1:30 PM Nica Kitchen MD [facility]

## 2020-09-02 NOTE — TELEPHONE ENCOUNTER
Pt called requesting 90 day Rx for Myrbetriq 50 mg one tablet daily    [address] [facility]

## 2020-09-02 NOTE — TELEPHONE ENCOUNTER
Date of last visit:  8/25/2020  Date of next visit:  12/1/2020    Requested Prescriptions     Pending Prescriptions Disp Refills    mirabegron (MYRBETRIQ) 50 MG TB24 90 tablet 1     Sig: take 1 tablet by mouth once daily

## 2020-09-14 NOTE — TELEPHONE ENCOUNTER
Date of last visit:  8/25/2020  Date of next visit:  12/1/2020    Requested Prescriptions     Pending Prescriptions Disp Refills    levothyroxine (SYNTHROID) 50 MCG tablet [Pharmacy Med Name: LEVOTHYROXINE 50 MCG TABLET] 90 tablet 1     Sig: take 1 tablet by mouth once daily

## 2020-09-15 RX ORDER — LEVOTHYROXINE SODIUM 0.05 MG/1
TABLET ORAL
Qty: 90 TABLET | Refills: 1 | Status: SHIPPED | OUTPATIENT
Start: 2020-09-15 | End: 2021-03-18

## 2020-09-21 ENCOUNTER — NURSE ONLY (OUTPATIENT)
Dept: FAMILY MEDICINE CLINIC | Age: 85
End: 2020-09-21

## 2020-09-21 PROCEDURE — 90688 IIV4 VACCINE SPLT 0.5 ML IM: CPT | Performed by: FAMILY MEDICINE

## 2020-09-21 PROCEDURE — G0008 ADMIN INFLUENZA VIRUS VAC: HCPCS | Performed by: FAMILY MEDICINE

## 2020-10-02 RX ORDER — LORAZEPAM 0.5 MG/1
TABLET ORAL
Qty: 120 TABLET | Refills: 0 | Status: SHIPPED | OUTPATIENT
Start: 2020-10-02 | End: 2020-11-20 | Stop reason: SDUPTHER

## 2020-10-02 RX ORDER — GABAPENTIN 300 MG/1
300 CAPSULE ORAL NIGHTLY
Qty: 180 CAPSULE | Refills: 1 | Status: SHIPPED | OUTPATIENT
Start: 2020-10-02 | End: 2021-10-29 | Stop reason: SDUPTHER

## 2020-10-02 NOTE — TELEPHONE ENCOUNTER
Date of last visit:  8/25/2020  Date of next visit:  12/1/2020    Requested Prescriptions     Pending Prescriptions Disp Refills    LORazepam (ATIVAN) 0.5 MG tablet 120 tablet 0     Sig: take 1 tablet by mouth every 6 hours if needed for anxiety    gabapentin (NEURONTIN) 300 MG capsule 180 capsule 1     Sig: Take 1 capsule by mouth nightly for 180 days.  Intended supply: 90 days

## 2020-10-02 NOTE — TELEPHONE ENCOUNTER
Víctor Castaneda called requesting a refill of their:    LORazepam (ATIVAN) 0.5 MG tablet take 1 tablet by mouth every 6 hours if needed for anxiety  gabapentin (NEURONTIN) 300 MG capsule Take 1 capsule by mouth nightly for 180 days    Send to [facility] on [address]

## 2020-10-09 NOTE — TELEPHONE ENCOUNTER
Daysi Easley called requesting a refill on the following medications:  Requested Prescriptions     Pending Prescriptions Disp Refills    traMADol (ULTRAM) 50 MG tablet 180 tablet 0     Sig: Take 2 tablets by mouth every 8 hours as needed for Pain for up to 30 days.      Pharmacy verified:  [email or web address]  [facility] on [address]    Date of last visit: 07/28/2020  Date of next visit (if applicable): 12/60/8168

## 2020-10-12 RX ORDER — TRAMADOL HYDROCHLORIDE 50 MG/1
100 TABLET ORAL EVERY 8 HOURS PRN
Qty: 180 TABLET | Refills: 0 | Status: SHIPPED | OUTPATIENT
Start: 2020-10-12 | End: 2020-11-23 | Stop reason: SDUPTHER

## 2020-10-12 NOTE — TELEPHONE ENCOUNTER
OARRS reviewed. UDS: + for  Gabapentin lorazepam tramadol consistent. Last seen: 7/28/2020.  Follow-up:   Future Appointments   Date Time Provider Singh Natalya   10/28/2020  4:00 PM KARTHIKEYAN Hernandez - CNP [facility] [place]   12/1/2020  1:30 PM David Tierney MD [facility]

## 2020-10-29 ENCOUNTER — TELEPHONE (OUTPATIENT)
Dept: FAMILY MEDICINE CLINIC | Age: 85
End: 2020-10-29

## 2020-10-29 NOTE — TELEPHONE ENCOUNTER
Pt called and would like an order for a Dexa Scan. She is having a mammogram done and would like to do at the same time. Please call when done.

## 2020-11-04 ENCOUNTER — OFFICE VISIT (OUTPATIENT)
Dept: PHYSICAL MEDICINE AND REHAB | Age: 85
End: 2020-11-04
Payer: MEDICARE

## 2020-11-04 VITALS
DIASTOLIC BLOOD PRESSURE: 62 MMHG | SYSTOLIC BLOOD PRESSURE: 110 MMHG | TEMPERATURE: 97.7 F | BODY MASS INDEX: 21.79 KG/M2 | WEIGHT: 111 LBS | HEIGHT: 60 IN

## 2020-11-04 PROCEDURE — 4040F PNEUMOC VAC/ADMIN/RCVD: CPT | Performed by: NURSE PRACTITIONER

## 2020-11-04 PROCEDURE — G8427 DOCREV CUR MEDS BY ELIG CLIN: HCPCS | Performed by: NURSE PRACTITIONER

## 2020-11-04 PROCEDURE — G8420 CALC BMI NORM PARAMETERS: HCPCS | Performed by: NURSE PRACTITIONER

## 2020-11-04 PROCEDURE — 99213 OFFICE O/P EST LOW 20 MIN: CPT | Performed by: NURSE PRACTITIONER

## 2020-11-04 PROCEDURE — 1090F PRES/ABSN URINE INCON ASSESS: CPT | Performed by: NURSE PRACTITIONER

## 2020-11-04 PROCEDURE — 1036F TOBACCO NON-USER: CPT | Performed by: NURSE PRACTITIONER

## 2020-11-04 PROCEDURE — 1123F ACP DISCUSS/DSCN MKR DOCD: CPT | Performed by: NURSE PRACTITIONER

## 2020-11-04 PROCEDURE — G8482 FLU IMMUNIZE ORDER/ADMIN: HCPCS | Performed by: NURSE PRACTITIONER

## 2020-11-04 ASSESSMENT — ENCOUNTER SYMPTOMS
COLOR CHANGE: 0
DIARRHEA: 0
WHEEZING: 0
EYE PAIN: 0
NAUSEA: 1
VOMITING: 0
PHOTOPHOBIA: 0
COUGH: 0
BACK PAIN: 1
SORE THROAT: 0
CONSTIPATION: 1
ABDOMINAL PAIN: 1
SHORTNESS OF BREATH: 0
RHINORRHEA: 0
SINUS PRESSURE: 0
CHEST TIGHTNESS: 0

## 2020-11-04 NOTE — PROGRESS NOTES
[facility]  [address]  Dept: [phone]  Dept Fax: [phone]: [phone]    Visit Date: 11/4/2020    Functionality Assessment/Goals Worksheet     On a scale of 0 (Does not Interfere) to 10 (Completely Interferes)     1. Which number describes how during the past week pain has interfered with       the following:  A. General Activity:  2  B. Mood: 0  C. Walking Ability:  9  D. Normal Work (Includes both work outside the home and housework):  4  E. Relations with Other People:   3  F. Sleep:   0  G. Enjoyment of Life:   1    2. Patient Prefers to Take their Pain Medications:     [x]  On a regular basis   []  Only when necessary    []  Does not take pain medications    3. What are the Patient's Goals/Expectations for Visiting Pain Management? []  Learn about my pain    [x]  Receive Medication   []  Physical Therapy     []  Treat Depression   []  Receive Injections    []  Treat Sleep   [x]  Deal with Anxiety and Stress   []  Treat Opoid Dependence/Addiction   [x]  Other: To be able to walk better    HPI:   Andreia Pereira is a 80 y.o. female is here today for    Chief Complaint: Low back pain, Mid back pain, Hip pain and Knee pain     HPI   F/U here with family, using quad cane. She had PT recently and state sit helped some with her strength and endurance and balance. She would like to continue. She has had steroid injection to right shoulder without relief, Dr Taty Reese states she is not a surgical candidate. She continues to take Tylenol Tramadol Neurontin. We have discussed PT, TENS dry needling, TPI, US steroid therapy, lumbar injections hip steroid injection, muscle relaxer, NSAIDS. Medications reviewed. Patient denies side effects with medications. Patient states she is taking medications as prescribed. Shedenies receiving pain medications from other sources. needed for anxiety, Disp: 120 tablet, Rfl: 0    gabapentin (NEURONTIN) 300 MG capsule, Take 1 capsule by mouth nightly for 180 days. Intended supply: 90 days, Disp: 180 capsule, Rfl: 1    levothyroxine (SYNTHROID) 50 MCG tablet, take 1 tablet by mouth once daily, Disp: 90 tablet, Rfl: 1    mirabegron (MYRBETRIQ) 50 MG TB24, take 1 tablet by mouth once daily, Disp: 90 tablet, Rfl: 1    PARoxetine (PAXIL) 20 MG tablet, Take 1 tablet by mouth daily, Disp: 90 tablet, Rfl: 0    pantoprazole (PROTONIX) 40 MG tablet, take 1 tablet by mouth twice daily 30 MINUTES PRIOR TO AM MEAL AND SUPPER, Disp: 180 tablet, Rfl: 1    Multiple Vitamins-Minerals (PRESERVISION/LUTEIN) CAPS, Take 1 capsule by mouth 2 times daily. , Disp: , Rfl:     Subjective:      Review of Systems   Constitutional: Positive for activity change. Negative for appetite change, chills, diaphoresis, fatigue, fever and unexpected weight change. HENT: Negative for congestion, ear pain, hearing loss, mouth sores, nosebleeds, rhinorrhea, sinus pressure and sore throat. Eyes: Negative for photophobia, pain and visual disturbance. Respiratory: Negative for cough, chest tightness, shortness of breath and wheezing. Cardiovascular: Negative for chest pain and palpitations. Gastrointestinal: Positive for abdominal pain, constipation and nausea. Negative for diarrhea and vomiting. Hx Hernia  Following  GI for abdominal pain nausea   Endocrine: Negative for cold intolerance, heat intolerance, polydipsia, polyphagia and polyuria. Genitourinary: Negative for decreased urine volume, difficulty urinating, frequency and hematuria. Musculoskeletal: Positive for arthralgias, back pain, joint swelling and myalgias. Negative for gait problem, neck pain and neck stiffness. Skin: Negative for color change and rash. Allergic/Immunologic: Negative for food allergies and immunocompromised state.    Neurological: Negative for dizziness, tremors, seizures, syncope, facial asymmetry, speech difficulty, weakness, light-headedness, numbness and headaches. Hematological: Does not bruise/bleed easily. Psychiatric/Behavioral: Negative for agitation, behavioral problems, confusion, decreased concentration, dysphoric mood, hallucinations, self-injury, sleep disturbance and suicidal ideas. The patient is nervous/anxious. The patient is not hyperactive. Objective:     Vitals:    11/04/20 1609   BP: 110/62   Temp: 97.7 °F (36.5 °C)   Weight: 111 lb (50.3 kg)   Height: 5' (1.524 m)       Physical Exam  Vitals signs and nursing note reviewed. Constitutional:       General: She is not in acute distress. Appearance: She is not diaphoretic. Comments: Elderly fragile female   HENT:      Head: Normocephalic and atraumatic. Right Ear: External ear normal.      Left Ear: External ear normal.      Nose: Nose normal.      Mouth/Throat:      Pharynx: No oropharyngeal exudate. Eyes:      General: No scleral icterus. Right eye: No discharge. Left eye: No discharge. Conjunctiva/sclera: Conjunctivae normal.      Pupils: Pupils are equal, round, and reactive to light. Neck:      Musculoskeletal: Full passive range of motion without pain, normal range of motion and neck supple. Normal range of motion. No edema, erythema, neck rigidity or muscular tenderness. Thyroid: No thyromegaly. Cardiovascular:      Rate and Rhythm: Normal rate and regular rhythm. Heart sounds: Normal heart sounds. No murmur. No friction rub. No gallop. Pulmonary:      Effort: Pulmonary effort is normal. No respiratory distress. Breath sounds: Normal breath sounds. No wheezing or rales. Chest:      Chest wall: No tenderness. Abdominal:      General: Bowel sounds are normal. There is no distension. Palpations: Abdomen is soft. Tenderness: There is no abdominal tenderness. There is no guarding or rebound.        Musculoskeletal:      Right shoulder: She exhibits decreased range of motion, tenderness, bony tenderness, pain, spasm and decreased strength. Left shoulder: She exhibits normal range of motion, no tenderness and no pain. Right hip: She exhibits no tenderness. Left hip: She exhibits no tenderness. Right knee: She exhibits normal range of motion and no swelling. No tenderness found. Left knee: She exhibits normal range of motion and no swelling. No tenderness found. Right ankle: She exhibits decreased range of motion and swelling. Tenderness. Left ankle: She exhibits no swelling. Cervical back: She exhibits decreased range of motion, tenderness, pain and spasm. Thoracic back: She exhibits tenderness, bony tenderness, pain and spasm. Lumbar back: She exhibits decreased range of motion, tenderness and bony tenderness. Right hand: She exhibits deformity and swelling. Left hand: She exhibits deformity and swelling. Right lower leg: She exhibits no swelling. Left lower leg: She exhibits no swelling. Legs:       Right foot: Decreased range of motion. Tenderness and bony tenderness present. Skin:     General: Skin is warm. Coloration: Skin is not pale. Findings: No erythema or rash. Neurological:      Mental Status: She is alert and oriented to person, place, and time. She is not disoriented. Cranial Nerves: Cranial nerves are intact. No cranial nerve deficit. Sensory: Sensation is intact. No sensory deficit. Motor: Weakness present. No atrophy or abnormal muscle tone. Coordination: Coordination is intact. Coordination normal.      Gait: Gait abnormal.      Deep Tendon Reflexes: Babinski sign absent on the right side. Babinski sign absent on the left side. Reflex Scores:       Tricep reflexes are 2+ on the right side and 2+ on the left side. Bicep reflexes are 2+ on the right side and 2+ on the left side.        Brachioradialis reflexes are 2+ on the right side and 2+ on the left side. Patellar reflexes are 1+ on the right side and 1+ on the left side. Achilles reflexes are 1+ on the right side and 1+ on the left side. Comments: Gait-cane   Psychiatric:         Attention and Perception: Attention and perception normal. She is attentive. Mood and Affect: Mood and affect normal. Mood is not anxious or depressed. Affect is not labile, blunt, angry or inappropriate. Speech: Speech normal.         Behavior: Behavior normal. Behavior is not agitated, slowed, aggressive, withdrawn, hyperactive or combative. Thought Content: Thought content normal. Thought content is not paranoid or delusional. Thought content does not include homicidal or suicidal ideation. Thought content does not include homicidal or suicidal plan. Cognition and Memory: Cognition and memory normal. Memory is not impaired. She does not exhibit impaired recent memory or impaired remote memory. Judgment: Judgment normal. Judgment is not impulsive or inappropriate. JOSELITO test:+  Yeomans test: +  Gaenslen test: +     Assessment:     1. Primary osteoarthritis of right shoulder    2. Primary osteoarthritis involving multiple joints    3. Primary osteoarthritis of both hands    4. Therapeutic opioid-induced constipation (OIC)    5. Idiopathic peripheral neuropathy    6. Chronic midline low back pain without sciatica    7. Bursitis of other bursa of right hip    8. Chronic pain syndrome    9. Hip strain, right, initial encounter    10. SI (sacroiliac) joint inflammation ([facility] [address]    11. Neck pain    12. Chronic bilateral thoracic back pain            Plan:      · OARRS reviewed. Current MED:32  · Patient was not offered naloxone for home. · Discussed long term side effects of medications, tolerance, dependency and addiction. · Previous UDS reviewed  · UDS preformed today for compliance.   · Patient told can not receive any pain medications from any other source. · No evidence of abuse, diversion or aberrant behavior.  Medications and/or procedures to improve function and quality of life- patient understanding with this and that may not be pain free   Discussed with patient about safe storage of medications at home   Discussed possible weaning of medication dosing dependent on treatment/procedure results.  Testing: reviewed    Procedures: refuses all offered   Discussed with patient about risks with procedure including infection, reaction to medication, increased pain, or bleeding.  Medications:Tramaodl Neurontin Tylenol  BIOMED    PT [place]   dry needling      Meds. Prescribed:   No orders of the defined types were placed in this encounter. Return in about 3 months (around 2/4/2021) for Follow up pain medications. Time spent with patient was 15  minutes, more than 50% was spent Counseling/coordinated the patient'scare.       Electronically signed by KARTHIKEYAN Peace CNP on11/4/2020 at 5:11 PM

## 2020-11-16 ENCOUNTER — HOSPITAL ENCOUNTER (OUTPATIENT)
Dept: PHYSICAL THERAPY | Age: 85
Setting detail: THERAPIES SERIES
Discharge: HOME | End: 2020-11-16
Payer: MEDICARE

## 2020-11-18 NOTE — TELEPHONE ENCOUNTER
Patient called req ref of Lorazepam to [facility] on [address].     Last appt      8/25/20    Next appt    12/1/20

## 2020-11-20 RX ORDER — LORAZEPAM 0.5 MG/1
TABLET ORAL
Qty: 120 TABLET | Refills: 0 | Status: SHIPPED | OUTPATIENT
Start: 2020-11-20 | End: 2021-01-15 | Stop reason: SDUPTHER

## 2020-11-20 NOTE — TELEPHONE ENCOUNTER
Date of last visit:  8/25/2020  Date of next visit:  12/1/2020    Requested Prescriptions     Pending Prescriptions Disp Refills    PARoxetine (PAXIL) 20 MG tablet [Pharmacy Med Name: PAROXETINE HCL 20 MG TABLET] 90 tablet 0     Sig: take 1 tablet by mouth once daily

## 2020-11-20 NOTE — TELEPHONE ENCOUNTER
Chary Vela called requesting a refill on the following medications:  Requested Prescriptions     Pending Prescriptions Disp Refills    traMADol (ULTRAM) 50 MG tablet 180 tablet 0     Sig: Take 2 tablets by mouth every 8 hours as needed for Pain for up to 30 days. [facility]   . pv      Date of last visit: 11/04/20  Date of next visit (if applicable): 0/7/0698

## 2020-11-21 RX ORDER — PAROXETINE HYDROCHLORIDE 20 MG/1
TABLET, FILM COATED ORAL
Qty: 90 TABLET | Refills: 0 | Status: SHIPPED | OUTPATIENT
Start: 2020-11-21 | End: 2021-02-16

## 2020-11-23 RX ORDER — TRAMADOL HYDROCHLORIDE 50 MG/1
100 TABLET ORAL EVERY 8 HOURS PRN
Qty: 180 TABLET | Refills: 0 | Status: SHIPPED | OUTPATIENT
Start: 2020-11-23 | End: 2021-01-07 | Stop reason: SDUPTHER

## 2020-11-23 NOTE — TELEPHONE ENCOUNTER
OARRS reviewed. UDS: + for  Tramadol gabapentin consistent. Last seen: 11/4/2020.  Follow-up:   Future Appointments   Date Time Provider Singh Natalya   11/24/2020 12:20 PM STR MAMMOGRAPHY [address] STR Radiolog   11/24/2020  1:30 PM STR [facility] CENTER DEXA RM STRZ WOMEN STR Radiolog   12/1/2020  1:30 PM Clark Cabral MD [facility]   2/4/2021  3:40 PM [facility] Miryam Bone, APRN - CNP SRPX Pain EDD - [place] Feliciano

## 2020-11-24 ENCOUNTER — HOSPITAL ENCOUNTER (OUTPATIENT)
Dept: WOMENS IMAGING | Age: 85
Discharge: HOME | End: 2020-11-24
Payer: MEDICARE

## 2020-11-24 PROCEDURE — 77063 BREAST TOMOSYNTHESIS BI: CPT

## 2020-11-24 PROCEDURE — 77080 DXA BONE DENSITY AXIAL: CPT

## 2020-11-25 ENCOUNTER — TELEPHONE (OUTPATIENT)
Dept: FAMILY MEDICINE CLINIC | Age: 85
End: 2020-11-25

## 2020-11-25 NOTE — TELEPHONE ENCOUNTER
Please order the Prolia and Labs for patient to get drawn.      Date of last visit:  8/25/2020  Date of next visit:  12/1/2020    Requested Prescriptions     Pending Prescriptions Disp Refills    denosumab (PROLIA) 60 MG/ML SOSY SC injection 1 mL 0     Sig: Inject 1 mL into the skin once for 1 dose

## 2020-11-25 NOTE — TELEPHONE ENCOUNTER
----- Message from Haley Hebert MD sent at 11/25/2020  6:14 AM EST -----  Still with osteoporosis and overall about the same  She still or due  For the prolia as would continue

## 2020-11-26 RX ORDER — DENOSUMAB 60 MG/ML
60 INJECTION SUBCUTANEOUS ONCE
Qty: 1 ML | Refills: 0 | Status: SHIPPED | OUTPATIENT
Start: 2020-11-26 | End: 2021-04-12 | Stop reason: ALTCHOICE

## 2020-11-30 ENCOUNTER — HOSPITAL ENCOUNTER (OUTPATIENT)
Age: 85
Discharge: HOME | End: 2020-11-30
Payer: MEDICARE

## 2020-11-30 LAB
ANION GAP SERPL CALCULATED.3IONS-SCNC: 10 MEQ/L (ref 8–16)
BUN BLDV-MCNC: 17 MG/DL (ref 7–22)
CALCIUM SERPL-MCNC: 10.2 MG/DL (ref 8.5–10.5)
CHLORIDE BLD-SCNC: 101 MEQ/L (ref 98–111)
CO2: 28 MEQ/L (ref 23–33)
CREAT SERPL-MCNC: 1 MG/DL (ref 0.4–1.2)
GFR SERPL CREATININE-BSD FRML MDRD: 52 ML/MIN/1.73M2
GLUCOSE BLD-MCNC: 125 MG/DL (ref 70–108)
POTASSIUM SERPL-SCNC: 4 MEQ/L (ref 3.5–5.2)
SODIUM BLD-SCNC: 139 MEQ/L (ref 135–145)

## 2020-11-30 PROCEDURE — 36415 COLL VENOUS BLD VENIPUNCTURE: CPT

## 2020-11-30 PROCEDURE — 80048 BASIC METABOLIC PNL TOTAL CA: CPT

## 2020-12-01 ENCOUNTER — TELEPHONE (OUTPATIENT)
Dept: FAMILY MEDICINE CLINIC | Age: 85
End: 2020-12-01

## 2020-12-01 NOTE — TELEPHONE ENCOUNTER
----- Message from Ferny Kim MD sent at 12/1/2020  5:11 AM EST -----  Call as kidney function and calcium ok for medication and proceed with prolia

## 2020-12-01 NOTE — TELEPHONE ENCOUNTER
Prolia order and records faxed to [facility]- they will run her benefits through the Pharmacy and once approved they will call the patient to schedule. Called patient, no answer and could not leave a message.

## 2020-12-01 NOTE — TELEPHONE ENCOUNTER
Prolia order and records faxed to [facility]- they will run her benefits through the Pharmacy and once approved they will call the patient to schedule.

## 2020-12-22 ENCOUNTER — HOSPITAL ENCOUNTER (OUTPATIENT)
Dept: NURSING | Age: 85
Discharge: HOME | End: 2020-12-22
Payer: MEDICARE

## 2020-12-22 VITALS
HEART RATE: 79 BPM | TEMPERATURE: 96.4 F | DIASTOLIC BLOOD PRESSURE: 76 MMHG | RESPIRATION RATE: 18 BRPM | SYSTOLIC BLOOD PRESSURE: 138 MMHG

## 2020-12-22 DIAGNOSIS — M81.0 AGE-RELATED OSTEOPOROSIS WITHOUT CURRENT PATHOLOGICAL FRACTURE: Primary | ICD-10-CM

## 2020-12-22 PROCEDURE — 6360000002 HC RX W HCPCS: Performed by: FAMILY MEDICINE

## 2020-12-22 PROCEDURE — 96372 THER/PROPH/DIAG INJ SC/IM: CPT

## 2020-12-22 RX ADMIN — DENOSUMAB 60 MG: 60 INJECTION SUBCUTANEOUS at 13:22

## 2020-12-22 ASSESSMENT — PAIN - FUNCTIONAL ASSESSMENT: PAIN_FUNCTIONAL_ASSESSMENT: 0-10

## 2020-12-22 ASSESSMENT — PAIN SCALES - GENERAL: PAINLEVEL_OUTOF10: 0

## 2020-12-22 NOTE — PROGRESS NOTES
[facility] PROCEDURE REVIEWED WITH PT VERBALIZED UNDERSTANDING. Pt rights and responsibilities offered to pt to read. _M___ Safety:       (Environmental)  ? Stout to environment  ? Ensure ID band is correct and in place/ allergy band as needed  ? Assess for fall risk  ? Initiate fall precautions as applicable (fall band, side rails, etc.)  ? Call light within reach  ? Bed in low position/ wheels locked    M____ Pain:       ? Assess pain level and characteristics  ? Administer analgesics as ordered  ? Assess effectiveness of pain management and report to MD as needed    _M___ Knowledge Deficit:  ? Assess baseline knowledge  ? Provide teaching at level of understanding  ? Provide teaching via preferred learning method  ? Evaluate teaching effectiveness    __M__ Hemodynamic/Respiratory Status:  ? (  ? Assess vital signs/ LOC until patient meets discharge criteria  ? Monitor procedure site and notify MD of any issues    [address]. HOME INSTRUCTIONS TO PT VERBALIZED UNDERSTANDING. [address] WITH FAMILY. _

## 2021-01-06 DIAGNOSIS — G89.4 CHRONIC PAIN SYNDROME: ICD-10-CM

## 2021-01-06 NOTE — TELEPHONE ENCOUNTER
Carly Burr called requesting a refill on the following medications:  Requested Prescriptions     Pending Prescriptions Disp Refills    traMADol (ULTRAM) 50 MG tablet 180 tablet 0     Sig: Take 2 tablets by mouth every 8 hours as needed for Pain for up to 30 days.      Pharmacy verified: [facility]  .pv      Date of last visit: 11/04/2020  Date of next visit (if applicable): 1/3/5293

## 2021-01-07 RX ORDER — TRAMADOL HYDROCHLORIDE 50 MG/1
100 TABLET ORAL EVERY 8 HOURS PRN
Qty: 180 TABLET | Refills: 0 | Status: SHIPPED | OUTPATIENT
Start: 2021-01-07 | End: 2021-02-22 | Stop reason: SDUPTHER

## 2021-01-07 NOTE — TELEPHONE ENCOUNTER
OARRS reviewed. UDS: + for  Tramadol, gabapentin - consistent     Last seen: 11/4/2020.      Follow-up:   Future Appointments   Date Time Provider Singh Natalya   1/8/2021  2:10 PM Jeanine Dickson MD [facility]   2/4/2021  3:40 PM KARTHIKEYAN North - CNP N SRPX Pain MHP - [facility]

## 2021-01-08 ENCOUNTER — OFFICE VISIT (OUTPATIENT)
Dept: FAMILY MEDICINE CLINIC | Age: 86
End: 2021-01-08

## 2021-01-08 VITALS
BODY MASS INDEX: 21.79 KG/M2 | HEART RATE: 76 BPM | HEIGHT: 60 IN | SYSTOLIC BLOOD PRESSURE: 112 MMHG | WEIGHT: 111 LBS | RESPIRATION RATE: 10 BRPM | DIASTOLIC BLOOD PRESSURE: 66 MMHG | TEMPERATURE: 97 F

## 2021-01-08 DIAGNOSIS — F41.9 ANXIETY: ICD-10-CM

## 2021-01-08 DIAGNOSIS — M81.0 AGE-RELATED OSTEOPOROSIS WITHOUT CURRENT PATHOLOGICAL FRACTURE: ICD-10-CM

## 2021-01-08 DIAGNOSIS — N31.9 NEUROGENIC BLADDER: ICD-10-CM

## 2021-01-08 DIAGNOSIS — G60.9 IDIOPATHIC PERIPHERAL NEUROPATHY: ICD-10-CM

## 2021-01-08 DIAGNOSIS — M54.50 CHRONIC MIDLINE LOW BACK PAIN WITHOUT SCIATICA: Primary | ICD-10-CM

## 2021-01-08 DIAGNOSIS — G89.29 CHRONIC MIDLINE LOW BACK PAIN WITHOUT SCIATICA: Primary | ICD-10-CM

## 2021-01-08 DIAGNOSIS — E55.9 VITAMIN D DEFICIENCY: ICD-10-CM

## 2021-01-08 PROCEDURE — 99213 OFFICE O/P EST LOW 20 MIN: CPT | Performed by: FAMILY MEDICINE

## 2021-01-08 PROCEDURE — 1036F TOBACCO NON-USER: CPT | Performed by: FAMILY MEDICINE

## 2021-01-08 PROCEDURE — G8427 DOCREV CUR MEDS BY ELIG CLIN: HCPCS | Performed by: FAMILY MEDICINE

## 2021-01-08 PROCEDURE — G8420 CALC BMI NORM PARAMETERS: HCPCS | Performed by: FAMILY MEDICINE

## 2021-01-08 PROCEDURE — 1123F ACP DISCUSS/DSCN MKR DOCD: CPT | Performed by: FAMILY MEDICINE

## 2021-01-08 PROCEDURE — 1090F PRES/ABSN URINE INCON ASSESS: CPT | Performed by: FAMILY MEDICINE

## 2021-01-08 PROCEDURE — 4040F PNEUMOC VAC/ADMIN/RCVD: CPT | Performed by: FAMILY MEDICINE

## 2021-01-08 ASSESSMENT — ENCOUNTER SYMPTOMS
CONSTIPATION: 0
WHEEZING: 0
ABDOMINAL PAIN: 0
SORE THROAT: 0
CHEST TIGHTNESS: 0
COUGH: 0
NAUSEA: 0
EYE PAIN: 0

## 2021-01-08 NOTE — PROGRESS NOTES
Subjective:      Patient ID: Natasha Crump is a 80 y.o. female. Osteoporosis    Stable       Thyroid  Stable      anxieity noted                Gastroesophageal Reflux  She reports no abdominal pain, no coughing, no nausea, no sore throat or no wheezing. This is a chronic problem. The current episode started more than 1 year ago. The problem occurs rarely. The problem has been resolved. The symptoms are aggravated by certain foods. Pertinent negatives include no fatigue. She has tried a PPI for the symptoms. The treatment provided significant relief. Hyperlipidemia  This is a chronic problem. The current episode started more than 1 year ago. The problem is controlled. Current antihyperlipidemic treatment includes statins. The current treatment provides significant improvement of lipids. There are no compliance problems. Past Medical History:   Diagnosis Date    Acute blood loss as cause of postoperative anemia 12/2/2017    Alcohol abuse sober since Terell Ruiz polyp     Dr. Benoit Perez    Depression     Gastric ulcer 11/2017     nathanael     GERD (gastroesophageal reflux disease)     PUD 1998 Dr. Adilene Gupta    Hyperlipidemia     Hypertension 12/3/2017    Hypothyroid     Incisional hernia of anterior abdominal wall without obstruction or gangrene     Neuropathy, peripheral     Osteoarthritis     Osteoporosis     Pneumoperitoneum 11/29/2017    Thyroid disease     hypothyroid    Vitamin D deficiency        Review of Systems   Constitutional: Negative for appetite change, fatigue and fever. HENT: Negative for congestion, ear pain, postnasal drip and sore throat. Eyes: Negative for pain and visual disturbance. Respiratory: Negative for cough, chest tightness and wheezing. Cardiovascular: Negative for leg swelling. Gastrointestinal: Negative for abdominal pain, constipation and nausea. Genitourinary: Negative for dysuria and frequency. Musculoskeletal: Negative for arthralgias, joint swelling and neck stiffness. Skin: Negative for rash. Neurological: Negative for dizziness, weakness and numbness. Hematological: Negative for adenopathy. Does not bruise/bleed easily. Psychiatric/Behavioral: Negative for behavioral problems and sleep disturbance. The patient is not nervous/anxious. /66 (Site: Right Upper Arm, Position: Sitting, Cuff Size: Medium Adult)   Pulse 76   Temp 97 °F (36.1 °C) (Oral)   Resp 10   Ht 5' (1.524 m)   Wt 111 lb (50.3 kg)   BMI 21.68 kg/m²   Objective:   Physical Exam  Vitals signs and nursing note reviewed. Constitutional:       Appearance: She is well-developed. HENT:      Head: Normocephalic and atraumatic. Right Ear: External ear normal.      Left Ear: External ear normal.      Nose: Nose normal.   Eyes:      General: No scleral icterus. Conjunctiva/sclera: Conjunctivae normal.      Pupils: Pupils are equal, round, and reactive to light. Neck:      Musculoskeletal: Normal range of motion and neck supple. Thyroid: No thyromegaly. Vascular: No JVD. Cardiovascular:      Rate and Rhythm: Normal rate and regular rhythm. Heart sounds: Normal heart sounds. Pulmonary:      Effort: Pulmonary effort is normal.      Breath sounds: Normal breath sounds. No wheezing or rales. Abdominal:      General: Bowel sounds are normal. There is no distension. Palpations: Abdomen is soft. There is no mass. Tenderness: There is no abdominal tenderness. Musculoskeletal: Normal range of motion. General: No tenderness. Lymphadenopathy:      Cervical: No cervical adenopathy. Skin:     General: Skin is warm and dry. Findings: No rash. Neurological:      Mental Status: She is alert and oriented to person, place, and time. Cranial Nerves: No cranial nerve deficit. Deep Tendon Reflexes: Reflexes are normal and symmetric.          Assessment: Diagnosis Orders   1. Chronic midline low back pain without sciatica     2. Idiopathic peripheral neuropathy     3. Vitamin D deficiency     4. Neurogenic bladder     5. Age-related osteoporosis without current pathological fracture     6. Anxiety           Plan:      Current Outpatient Medications   Medication Sig Dispense Refill    traMADol (ULTRAM) 50 MG tablet Take 2 tablets by mouth every 8 hours as needed for Pain for up to 30 days. 180 tablet 0    PARoxetine (PAXIL) 20 MG tablet take 1 tablet by mouth once daily (Patient taking differently: 10 mg ) 90 tablet 0    gabapentin (NEURONTIN) 300 MG capsule Take 1 capsule by mouth nightly for 180 days. Intended supply: 90 days 180 capsule 1    levothyroxine (SYNTHROID) 50 MCG tablet take 1 tablet by mouth once daily 90 tablet 1    mirabegron (MYRBETRIQ) 50 MG TB24 take 1 tablet by mouth once daily 90 tablet 1    pantoprazole (PROTONIX) 40 MG tablet take 1 tablet by mouth twice daily 30 MINUTES PRIOR TO AM MEAL AND SUPPER 180 tablet 1    Multiple Vitamins-Minerals (PRESERVISION/LUTEIN) CAPS Take 1 capsule by mouth 2 times daily.  denosumab (PROLIA) 60 MG/ML SOSY SC injection Inject 1 mL into the skin once for 1 dose 1 mL 0     No current facility-administered medications for this visit. see      3 mths and  stable       No orders of the defined types were placed in this encounter.     Erlin Quintanilla MD

## 2021-01-14 DIAGNOSIS — F41.9 ANXIETY: ICD-10-CM

## 2021-01-14 NOTE — TELEPHONE ENCOUNTER
[facility] called requesting a refill of their:    LORazepam (ATIVAN) 0.5 MG tablet take 1 tablet by mouth every 6 hours if needed for anxiety    Send to [facility] on [address]

## 2021-01-14 NOTE — TELEPHONE ENCOUNTER
Date of last visit:  1/8/2021  Date of next visit:  4/12/2021    Requested Prescriptions     Pending Prescriptions Disp Refills    LORazepam (ATIVAN) 0.5 MG tablet 120 tablet 0     Sig: take 1 tablet by mouth every 6 hours if needed for anxiety

## 2021-01-15 RX ORDER — LORAZEPAM 0.5 MG/1
TABLET ORAL
Qty: 120 TABLET | Refills: 0 | Status: SHIPPED | OUTPATIENT
Start: 2021-01-15 | End: 2021-03-13 | Stop reason: SDUPTHER

## 2021-01-19 ENCOUNTER — IMMUNIZATION (OUTPATIENT)
Dept: PRIMARY CARE CLINIC | Age: 86
End: 2021-01-19
Payer: MEDICARE

## 2021-01-19 PROCEDURE — 0011A COVID-19, MODERNA VACCINE 100MCG/0.5ML DOSE: CPT | Performed by: FAMILY MEDICINE

## 2021-01-19 PROCEDURE — 91301 COVID-19, MODERNA VACCINE 100MCG/0.5ML DOSE: CPT | Performed by: FAMILY MEDICINE

## 2021-01-30 ENCOUNTER — NURSE TRIAGE (OUTPATIENT)
Dept: OTHER | Facility: CLINIC | Age: 86
End: 2021-01-30

## 2021-01-30 NOTE — TELEPHONE ENCOUNTER
[facility] [place] [address] [facility] Brief description of triage: Bruising at injections site and has spread to her back that is healing. Pt denies pain or itching. Pt is wanting to know if she should take 2nd injection. Triage indicates for patient for home care. Pt instructed to call PCP when office open to discuss bruise and second dose of vaccine. Care advice provided. Recommended using local department of health website for testing locations and up to date information. Caller verbalizes understanding, denies any other questions or concerns; instructed to call back for any new or worsening symptoms. Reason for Disposition   COVID-19 vaccine, injection site reaction (e.g., pain, redness, swelling), question about    Answer Assessment - Initial Assessment Questions  1. MAIN CONCERN OR SYMPTOM:  \"What is your main concern right now? \" \"What question do you have? \" \"What's the main symptom you're worried about? \" (e.g., fever, pain, redness, swelling)      Pt has bruising at injection site on the left arm traveling to her back    2. VACCINE: \"What vaccination did you receive? \" \"Is this your first or second shot? \" (e.g., none; Iline Pink, other)      Deajignesh Caruso and first dose    3. SYMPTOM ONSET: \"When did the bruise begin? \" (e.g., not relevant; hours, days)       Pt states 01/20/21 one day after receiving vaccine    4. SYMPTOM SEVERITY: \"How bad is it? \"       Bruise and traveling    5. FEVER: \"Is there a fever? \" If so, ask: \"What is it, how was it measured, and when did it start? \"       Denies    6. PAST REACTIONS: \"Have you reacted to immunizations before? \" If so, ask: \"What happened? \"      Na    7. OTHER SYMPTOMS: \"Do you have any other symptoms? \"      Denies    Protocols used: CORONAVIRUS (COVID-19) VACCINE QUESTIONS AND REACTIONS-ADULT-

## 2021-02-01 ENCOUNTER — TELEPHONE (OUTPATIENT)
Dept: FAMILY MEDICINE CLINIC | Age: 86
End: 2021-02-01

## 2021-02-01 NOTE — TELEPHONE ENCOUNTER
Pt called into office today and I tried to give her this message however she said she does not remember speaking to anyone about this reaction before today.

## 2021-02-01 NOTE — TELEPHONE ENCOUNTER
Pt called she said that she had bruising up and down arm, about 8 inches long from your 1st Covid vaccine Jose Postin) on 1/19/21. Had some chills that lasted that evening about an hour She is due on 2/16/21 for her next vaccine. She is wanting to know if she should get the 2nd vaccine. I asked her about calling to someone on 1/30/21 about this reaction and pt stated she does not remember speaking to anyone about this reaction before today.

## 2021-02-04 ENCOUNTER — OFFICE VISIT (OUTPATIENT)
Dept: PHYSICAL MEDICINE AND REHAB | Age: 86
End: 2021-02-04
Payer: MEDICARE

## 2021-02-04 VITALS
WEIGHT: 111 LBS | HEIGHT: 60 IN | DIASTOLIC BLOOD PRESSURE: 78 MMHG | HEART RATE: 80 BPM | TEMPERATURE: 97.3 F | SYSTOLIC BLOOD PRESSURE: 132 MMHG | BODY MASS INDEX: 21.79 KG/M2

## 2021-02-04 DIAGNOSIS — G60.9 IDIOPATHIC PERIPHERAL NEUROPATHY: ICD-10-CM

## 2021-02-04 DIAGNOSIS — M19.011 PRIMARY OSTEOARTHRITIS OF RIGHT SHOULDER: Primary | ICD-10-CM

## 2021-02-04 DIAGNOSIS — G89.4 CHRONIC PAIN SYNDROME: ICD-10-CM

## 2021-02-04 DIAGNOSIS — M15.9 PRIMARY OSTEOARTHRITIS INVOLVING MULTIPLE JOINTS: ICD-10-CM

## 2021-02-04 DIAGNOSIS — M19.042 PRIMARY OSTEOARTHRITIS OF BOTH HANDS: ICD-10-CM

## 2021-02-04 DIAGNOSIS — K59.03 THERAPEUTIC OPIOID-INDUCED CONSTIPATION (OIC): ICD-10-CM

## 2021-02-04 DIAGNOSIS — M70.71 BURSITIS OF OTHER BURSA OF RIGHT HIP: ICD-10-CM

## 2021-02-04 DIAGNOSIS — M46.1 SI (SACROILIAC) JOINT INFLAMMATION (HCC): ICD-10-CM

## 2021-02-04 DIAGNOSIS — M54.50 CHRONIC MIDLINE LOW BACK PAIN WITHOUT SCIATICA: ICD-10-CM

## 2021-02-04 DIAGNOSIS — M19.041 PRIMARY OSTEOARTHRITIS OF BOTH HANDS: ICD-10-CM

## 2021-02-04 DIAGNOSIS — G89.29 CHRONIC MIDLINE LOW BACK PAIN WITHOUT SCIATICA: ICD-10-CM

## 2021-02-04 DIAGNOSIS — T40.2X5A THERAPEUTIC OPIOID-INDUCED CONSTIPATION (OIC): ICD-10-CM

## 2021-02-04 PROCEDURE — G8482 FLU IMMUNIZE ORDER/ADMIN: HCPCS | Performed by: NURSE PRACTITIONER

## 2021-02-04 PROCEDURE — G8428 CUR MEDS NOT DOCUMENT: HCPCS | Performed by: NURSE PRACTITIONER

## 2021-02-04 PROCEDURE — 1090F PRES/ABSN URINE INCON ASSESS: CPT | Performed by: NURSE PRACTITIONER

## 2021-02-04 PROCEDURE — 1036F TOBACCO NON-USER: CPT | Performed by: NURSE PRACTITIONER

## 2021-02-04 PROCEDURE — 99214 OFFICE O/P EST MOD 30 MIN: CPT | Performed by: NURSE PRACTITIONER

## 2021-02-04 PROCEDURE — G8420 CALC BMI NORM PARAMETERS: HCPCS | Performed by: NURSE PRACTITIONER

## 2021-02-04 PROCEDURE — 4040F PNEUMOC VAC/ADMIN/RCVD: CPT | Performed by: NURSE PRACTITIONER

## 2021-02-04 PROCEDURE — 1123F ACP DISCUSS/DSCN MKR DOCD: CPT | Performed by: NURSE PRACTITIONER

## 2021-02-04 ASSESSMENT — ENCOUNTER SYMPTOMS
COLOR CHANGE: 0
BACK PAIN: 1
ABDOMINAL PAIN: 1
NAUSEA: 1
VOMITING: 0
SINUS PRESSURE: 0
CHEST TIGHTNESS: 0
RHINORRHEA: 0
COUGH: 0
SORE THROAT: 0
SHORTNESS OF BREATH: 0
DIARRHEA: 0
PHOTOPHOBIA: 0
WHEEZING: 0
CONSTIPATION: 1
EYE PAIN: 0

## 2021-02-04 NOTE — PROGRESS NOTES
[facility] [address]  Dept: [phone]  Dept Fax: [phone]: [phone]    Visit Date: 2/4/2021    Functionality Assessment/Goals Worksheet     On a scale of 0 (Does not Interfere) to 10 (Completely Interferes)     1. Which number describes how during the past week pain has interfered with       the following:  A. General Activity:  7  B. Mood: 7  C. Walking Ability:  1  D. Normal Work (Includes both work outside the home and housework):  4  E. Relations with Other People:   1  F. Sleep:   10  G. Enjoyment of Life:   10    2. Patient Prefers to Take their Pain Medications:     [x]  On a regular basis   []  Only when necessary    []  Does not take pain medications    3. What are the Patient's Goals/Expectations for Visiting Pain Management? []  Learn about my pain    [x]  Receive Medication   []  Physical Therapy     [x]  Treat Depression   [x]  Receive Injections    []  Treat Sleep   [x]  Deal with Anxiety and Stress   []  Treat Opoid Dependence/Addiction   [x]  Other:Denies blood thinners    HPI:   Lynne Henriquez is a 80 y.o. female is here today for    Chief Complaint: Low back pain, Mid back pain, Neck pain, Hip pain and Knee pain     HPI   F/U continues to have neck mid  Back hip knee shoulder pain. She  has had PT in past with relief. She has had right shoulder injection no relief. We have discussed scapular nerve block or TPI if need. She continues to take Tramadol Neurontin Tylenol prn. She has increased pain with reaching  pushing pulling cold weather bending lifting walking standing. Medications reviewed. Patient denies side effects with medications. Patient states she is taking medications as prescribed. Shedenies receiving pain medications from other sources. She denies any ER visits since last visit. Pain scale with out pain medications or at its worst is 4/10. Pain scale with pain medications or at its best is 0/10. Last dose of Tramadol  was today  Drug screen reviewed from 11/2020 and was appropriate  Pill count was completed today and was appropriate 93  Patient does not have naloxone available at home. Patient has not required use of naloxone at home since last office visit. The patientis allergic to amoxicillin-pot clavulanate; statins support therapy; and sulfa antibiotics. Past Medical History  Nathan Manuel  has a past medical history of Acute blood loss as cause of postoperative anemia, Alcohol abuse, Anxiety, Colon polyp, Depression, Gastric ulcer, GERD (gastroesophageal reflux disease), Hyperlipidemia, Hypertension, Hypothyroid, Incisional hernia of anterior abdominal wall without obstruction or gangrene, Neuropathy, peripheral, Osteoarthritis, Osteoporosis, Pneumoperitoneum, Thyroid disease, and Vitamin D deficiency. Past Surgical History  The patient  has a past surgical history that includes Cholecystectomy; joint replacement; hernia repair; Hemorrhoid surgery (); Endoscopy, colon, diagnostic (8-2014); eye surgery; Tonsillectomy; Colonoscopy; Upper gastrointestinal endoscopy; LAPAROTOMY EXPLORATORY (N/A, 11/29/2017); and Stomach surgery (11/2017). Family History  This patient's family history includes Bleeding Prob in her mother; Heart Attack in her father; Heart Disease in her father; Stroke in her mother. Social History  Nathan Manuel  reports that she quit smoking about 58 years ago. She has never used smokeless tobacco. She reports that she does not drink alcohol or use drugs.     Medications    Current Outpatient Medications:     LORazepam (ATIVAN) 0.5 MG tablet, take 1 tablet by mouth every 6 hours if needed for anxiety, Disp: 120 tablet, Rfl: 0   traMADol (ULTRAM) 50 MG tablet, Take 2 tablets by mouth every 8 hours as needed for Pain for up to 30 days. , Disp: 180 tablet, Rfl: 0    denosumab (PROLIA) 60 MG/ML SOSY SC injection, Inject 1 mL into the skin once for 1 dose, Disp: 1 mL, Rfl: 0    PARoxetine (PAXIL) 20 MG tablet, take 1 tablet by mouth once daily (Patient taking differently: 10 mg ), Disp: 90 tablet, Rfl: 0    gabapentin (NEURONTIN) 300 MG capsule, Take 1 capsule by mouth nightly for 180 days. Intended supply: 90 days, Disp: 180 capsule, Rfl: 1    levothyroxine (SYNTHROID) 50 MCG tablet, take 1 tablet by mouth once daily, Disp: 90 tablet, Rfl: 1    mirabegron (MYRBETRIQ) 50 MG TB24, take 1 tablet by mouth once daily, Disp: 90 tablet, Rfl: 1    pantoprazole (PROTONIX) 40 MG tablet, take 1 tablet by mouth twice daily 30 MINUTES PRIOR TO AM MEAL AND SUPPER, Disp: 180 tablet, Rfl: 1    Multiple Vitamins-Minerals (PRESERVISION/LUTEIN) CAPS, Take 1 capsule by mouth 2 times daily. , Disp: , Rfl:     Subjective:      Review of Systems   Constitutional: Positive for activity change. Negative for appetite change, chills, diaphoresis, fatigue, fever and unexpected weight change. HENT: Negative for congestion, ear pain, hearing loss, mouth sores, nosebleeds, rhinorrhea, sinus pressure and sore throat. Eyes: Negative for photophobia, pain and visual disturbance. Respiratory: Negative for cough, chest tightness, shortness of breath and wheezing. Cardiovascular: Negative for chest pain and palpitations. Gastrointestinal: Positive for abdominal pain, constipation and nausea. Negative for diarrhea and vomiting. Hx Hernia  Following  GI for abdominal pain nausea   Endocrine: Negative for cold intolerance, heat intolerance, polydipsia, polyphagia and polyuria. Genitourinary: Negative for decreased urine volume, difficulty urinating, frequency and hematuria. Musculoskeletal: Positive for arthralgias, back pain, joint swelling and myalgias. Negative for gait problem, neck pain and neck stiffness. Skin: Negative for color change and rash. Allergic/Immunologic: Negative for food allergies and immunocompromised state. Neurological: Positive for weakness. Negative for tremors, seizures, syncope, facial asymmetry, speech difficulty, light-headedness, numbness and headaches. Hematological: Does not bruise/bleed easily. Psychiatric/Behavioral: Negative for agitation, behavioral problems, confusion, decreased concentration, dysphoric mood, hallucinations, self-injury, sleep disturbance and suicidal ideas. The patient is nervous/anxious. The patient is not hyperactive. Objective:     Vitals:    02/04/21 1552   BP: 132/78   Site: Right Upper Arm   Position: Sitting   Cuff Size: Medium Adult   Pulse: 80   Temp: 97.3 °F (36.3 °C)   TempSrc: Temporal   Weight: 111 lb (50.3 kg)   Height: 5' (1.524 m)       Physical Exam  Vitals signs and nursing note reviewed. Constitutional:       General: She is not in acute distress. Appearance: She is not diaphoretic. Comments: Elderly fragile female   HENT:      Head: Normocephalic and atraumatic. Right Ear: External ear normal.      Left Ear: External ear normal.      Nose: Nose normal.      Mouth/Throat:      Pharynx: No oropharyngeal exudate. Eyes:      General: No scleral icterus. Right eye: No discharge. Left eye: No discharge. Conjunctiva/sclera: Conjunctivae normal.      Pupils: Pupils are equal, round, and reactive to light. Neck:      Musculoskeletal: Full passive range of motion without pain, normal range of motion and neck supple. Normal range of motion. No edema, erythema, neck rigidity or muscular tenderness. Thyroid: No thyromegaly. Cardiovascular:      Rate and Rhythm: Normal rate and regular rhythm. Heart sounds: Normal heart sounds. No murmur. No friction rub. No gallop. Pulmonary:      Effort: Pulmonary effort is normal. No respiratory distress. Breath sounds: Normal breath sounds. No wheezing or rales. Chest:      Chest wall: No tenderness. Abdominal:      General: Bowel sounds are normal. There is no distension. Palpations: Abdomen is soft. Tenderness: There is no abdominal tenderness. There is no guarding or rebound. Musculoskeletal:         General: Tenderness present. Right shoulder: She exhibits decreased range of motion, tenderness, bony tenderness, pain, spasm and decreased strength. Left shoulder: She exhibits bony tenderness. She exhibits normal range of motion, no tenderness and no pain. Right hip: She exhibits no tenderness. Left hip: She exhibits no tenderness. Right knee: She exhibits normal range of motion and no swelling. No tenderness found. Left knee: She exhibits normal range of motion and no swelling. No tenderness found. Right ankle: She exhibits decreased range of motion and swelling. Tenderness. Left ankle: She exhibits no swelling. Cervical back: She exhibits decreased range of motion, tenderness, pain and spasm. Thoracic back: She exhibits tenderness, bony tenderness, pain and spasm. Lumbar back: She exhibits decreased range of motion, tenderness and bony tenderness. Right hand: She exhibits deformity and swelling. Left hand: She exhibits deformity and swelling. Right lower leg: She exhibits no swelling. Left lower leg: She exhibits no swelling. Legs:       Right foot: Decreased range of motion. Tenderness and bony tenderness present. Skin:     General: Skin is warm. Coloration: Skin is not pale. Findings: No erythema or rash. Neurological:      Mental Status: She is alert and oriented to person, place, and time. She is not disoriented. Cranial Nerves: Cranial nerves are intact. No cranial nerve deficit. Sensory: Sensation is intact. No sensory deficit. Motor: Weakness present. No atrophy or abnormal muscle tone. Coordination: Coordination is intact. Coordination normal.      Gait: Gait abnormal.      Deep Tendon Reflexes: Babinski sign absent on the right side. Babinski sign absent on the left side. Reflex Scores:       Tricep reflexes are 2+ on the right side and 2+ on the left side. Bicep reflexes are 2+ on the right side and 2+ on the left side. Brachioradialis reflexes are 2+ on the right side and 2+ on the left side. Patellar reflexes are 1+ on the right side and 1+ on the left side. Achilles reflexes are 1+ on the right side and 1+ on the left side. Comments: Gait-cane   Psychiatric:         Attention and Perception: Attention and perception normal. She is attentive. Mood and Affect: Mood and affect normal. Mood is not anxious or depressed. Affect is not labile, blunt, angry or inappropriate. Speech: Speech normal.         Behavior: Behavior normal. Behavior is not agitated, slowed, aggressive, withdrawn, hyperactive or combative. Thought Content: Thought content normal. Thought content is not paranoid or delusional. Thought content does not include homicidal or suicidal ideation. Thought content does not include homicidal or suicidal plan. Cognition and Memory: Cognition and memory normal. Memory is not impaired. She does not exhibit impaired recent memory or impaired remote memory. Judgment: Judgment normal. Judgment is not impulsive or inappropriate. JOSLEITO test: +  Yeomans test:+  Gaenslen test: +     Assessment:     1. Primary osteoarthritis of right shoulder    2. Primary osteoarthritis involving multiple joints    3. Chronic pain syndrome    4. Primary osteoarthritis of both hands    5.  Therapeutic opioid-induced constipation (OIC) 6. Idiopathic peripheral neuropathy    7. Bursitis of other bursa of right hip    8. SI (sacroiliac) joint inflammation (HCC)    9. Chronic midline low back pain without sciatica            Plan:      · OARRS reviewed. Current MED: 32  · Patient was offered naloxone for home. refused  · Discussed long term side effects of medications, tolerance, dependency and addiction. · Previous UDS reviewed  · UDS preformed today for compliance. · Patient told can not receive any pain medications from any other source. · No evidence of abuse, diversion or aberrant behavior. ? Medications and/or procedures to improve function and quality of life- patient understanding with this and that may not be pain free  ? Discussed with patient about safe storage of medications at home  ? Discussed possible weaning of medication dosing dependent on treatment/procedure results. ? Testing: none  ? Procedures: none   ? Discussed with patient about risks with procedure including infection, reaction to medication, increased pain, or bleeding. ? Medications: Tramadol Neurontin   ? If patient is on blood thinners will need approval to hold:N/A      Meds. Prescribed:   No orders of the defined types were placed in this encounter. Return in about 3 months (around 5/4/2021) for Follow up pain medications.                Electronically signed by KARTHIKEYAN Higgins CNP on2/4/2021 at 4:15 PM

## 2021-02-15 DIAGNOSIS — F41.9 ANXIETY: ICD-10-CM

## 2021-02-15 NOTE — TELEPHONE ENCOUNTER
Date of last visit:  1/8/2021  Date of next visit:  4/12/2021    Requested Prescriptions     Pending Prescriptions Disp Refills    PARoxetine (PAXIL) 20 MG tablet [Pharmacy Med Name: PAROXETINE HCL 20 MG TABLET] 90 tablet 0     Sig: take 1 tablet by mouth once daily

## 2021-02-16 RX ORDER — PAROXETINE HYDROCHLORIDE 20 MG/1
TABLET, FILM COATED ORAL
Qty: 90 TABLET | Refills: 0 | Status: SHIPPED | OUTPATIENT
Start: 2021-02-16 | End: 2021-04-12

## 2021-02-19 DIAGNOSIS — G89.4 CHRONIC PAIN SYNDROME: ICD-10-CM

## 2021-02-22 RX ORDER — TRAMADOL HYDROCHLORIDE 50 MG/1
100 TABLET ORAL EVERY 8 HOURS PRN
Qty: 180 TABLET | Refills: 0 | Status: SHIPPED | OUTPATIENT
Start: 2021-02-22 | End: 2021-03-10

## 2021-02-23 ENCOUNTER — IMMUNIZATION (OUTPATIENT)
Dept: PRIMARY CARE CLINIC | Age: 86
End: 2021-02-23
Payer: MEDICARE

## 2021-02-23 PROCEDURE — 0012A COVID-19, MODERNA VACCINE 100MCG/0.5ML DOSE: CPT | Performed by: FAMILY MEDICINE

## 2021-02-23 PROCEDURE — 91301 COVID-19, MODERNA VACCINE 100MCG/0.5ML DOSE: CPT | Performed by: FAMILY MEDICINE

## 2021-03-08 DIAGNOSIS — N31.9 NEUROGENIC BLADDER: ICD-10-CM

## 2021-03-08 NOTE — TELEPHONE ENCOUNTER
Date of last visit:  1/8/2021  Date of next visit:  4/12/2021    Requested Prescriptions     Pending Prescriptions Disp Refills    mirabegron ([facility]) 50 MG TB24 [Pharmacy Med Name: MYRBETRIQ ER 50 MG TABLET] 90 tablet 1     Sig: take 1 tablet by mouth once daily

## 2021-03-08 NOTE — TELEPHONE ENCOUNTER
Date of last visit:  1/8/2021  Date of next visit:  4/12/2021    Requested Prescriptions     Pending Prescriptions Disp Refills    mirabegron (MYRBETRIQ) 50 MG TB24 90 tablet 1     Sig: take 1 tablet by mouth once daily

## 2021-03-10 DIAGNOSIS — G89.4 CHRONIC PAIN SYNDROME: ICD-10-CM

## 2021-03-10 NOTE — TELEPHONE ENCOUNTER
OARRS reviewed. UDS: + for  Gabapentin, Lorazepam, Tramadol. Last seen: 2/4/2021.  Follow-up: 5/5/21

## 2021-03-11 RX ORDER — TRAMADOL HYDROCHLORIDE 50 MG/1
TABLET ORAL
Qty: 180 TABLET | Refills: 0 | Status: SHIPPED | OUTPATIENT
Start: 2021-03-24 | End: 2021-04-14 | Stop reason: SDUPTHER

## 2021-03-12 DIAGNOSIS — F41.9 ANXIETY: ICD-10-CM

## 2021-03-13 RX ORDER — LORAZEPAM 0.5 MG/1
TABLET ORAL
Qty: 120 TABLET | Refills: 0 | Status: SHIPPED | OUTPATIENT
Start: 2021-03-13 | End: 2021-05-14 | Stop reason: SDUPTHER

## 2021-03-17 DIAGNOSIS — E03.4 HYPOTHYROIDISM DUE TO ACQUIRED ATROPHY OF THYROID: ICD-10-CM

## 2021-03-18 RX ORDER — LEVOTHYROXINE SODIUM 0.05 MG/1
TABLET ORAL
Qty: 90 TABLET | Refills: 1 | Status: SHIPPED | OUTPATIENT
Start: 2021-03-18 | End: 2021-09-14

## 2021-04-12 ENCOUNTER — OFFICE VISIT (OUTPATIENT)
Dept: FAMILY MEDICINE CLINIC | Age: 86
End: 2021-04-12

## 2021-04-12 VITALS
TEMPERATURE: 97 F | WEIGHT: 112.13 LBS | RESPIRATION RATE: 14 BRPM | HEART RATE: 76 BPM | SYSTOLIC BLOOD PRESSURE: 134 MMHG | DIASTOLIC BLOOD PRESSURE: 66 MMHG | HEIGHT: 60 IN | BODY MASS INDEX: 22.01 KG/M2

## 2021-04-12 DIAGNOSIS — H35.3220 EXUDATIVE AGE-RELATED MACULAR DEGENERATION OF LEFT EYE, UNSPECIFIED STAGE (HCC): ICD-10-CM

## 2021-04-12 DIAGNOSIS — F41.9 ANXIETY: ICD-10-CM

## 2021-04-12 DIAGNOSIS — F33.41 RECURRENT MAJOR DEPRESSIVE DISORDER, IN PARTIAL REMISSION (HCC): ICD-10-CM

## 2021-04-12 DIAGNOSIS — M54.50 CHRONIC MIDLINE LOW BACK PAIN WITHOUT SCIATICA: Primary | ICD-10-CM

## 2021-04-12 DIAGNOSIS — E78.00 PURE HYPERCHOLESTEROLEMIA: ICD-10-CM

## 2021-04-12 DIAGNOSIS — E03.4 HYPOTHYROIDISM DUE TO ACQUIRED ATROPHY OF THYROID: ICD-10-CM

## 2021-04-12 DIAGNOSIS — K21.9 GASTROESOPHAGEAL REFLUX DISEASE WITHOUT ESOPHAGITIS: ICD-10-CM

## 2021-04-12 DIAGNOSIS — G89.29 CHRONIC MIDLINE LOW BACK PAIN WITHOUT SCIATICA: Primary | ICD-10-CM

## 2021-04-12 PROBLEM — I10 HYPERTENSION: Status: RESOLVED | Noted: 2017-12-03 | Resolved: 2021-04-12

## 2021-04-12 PROCEDURE — G8427 DOCREV CUR MEDS BY ELIG CLIN: HCPCS | Performed by: FAMILY MEDICINE

## 2021-04-12 PROCEDURE — 1036F TOBACCO NON-USER: CPT | Performed by: FAMILY MEDICINE

## 2021-04-12 PROCEDURE — 4040F PNEUMOC VAC/ADMIN/RCVD: CPT | Performed by: FAMILY MEDICINE

## 2021-04-12 PROCEDURE — 99213 OFFICE O/P EST LOW 20 MIN: CPT | Performed by: FAMILY MEDICINE

## 2021-04-12 PROCEDURE — G8420 CALC BMI NORM PARAMETERS: HCPCS | Performed by: FAMILY MEDICINE

## 2021-04-12 PROCEDURE — 1123F ACP DISCUSS/DSCN MKR DOCD: CPT | Performed by: FAMILY MEDICINE

## 2021-04-12 PROCEDURE — 1090F PRES/ABSN URINE INCON ASSESS: CPT | Performed by: FAMILY MEDICINE

## 2021-04-12 RX ORDER — PAROXETINE HYDROCHLORIDE 20 MG/1
10 TABLET, FILM COATED ORAL EVERY MORNING
Qty: 90 TABLET | Refills: 0
Start: 2021-04-12 | End: 2021-05-21

## 2021-04-12 ASSESSMENT — ENCOUNTER SYMPTOMS
CHEST TIGHTNESS: 0
COUGH: 0
EYE PAIN: 0
WHEEZING: 0
CONSTIPATION: 0
NAUSEA: 0
ABDOMINAL PAIN: 0
SHORTNESS OF BREATH: 0
SORE THROAT: 0
BACK PAIN: 1

## 2021-04-12 NOTE — PROGRESS NOTES
Subjective:      Patient ID: Anastacio Ulloa is a 80 y.o. female. Anxiety  Stable       Neurogenic  Bladder    Stable           Thyroid  Stable       gerd  Stable     Back Pain  This is a chronic problem. The current episode started more than 1 year ago. The problem occurs daily. The problem has been resolved since onset. The pain is present in the lumbar spine (  use  tramadol  bid). The pain is moderate. Stiffness is present all day. Pertinent negatives include no abdominal pain, chest pain, dysuria, fever, headaches, numbness or weakness. The treatment provided moderate relief. Past Medical History:   Diagnosis Date    Acute blood loss as cause of postoperative anemia 12/2/2017    Alcohol abuse sober since [place] polyp     Dr. Iliana Garcia    Depression     Gastric ulcer 11/2017     wisser     GERD (gastroesophageal reflux disease)     PUD 1998 Dr. Alvina Fletcher    Hyperlipidemia     Hypertension 12/3/2017    Hypothyroid     Incisional hernia of anterior abdominal wall without obstruction or gangrene     Neuropathy, peripheral     Osteoarthritis     Osteoporosis     Pneumoperitoneum 11/29/2017    Thyroid disease     hypothyroid    Vitamin D deficiency        Review of Systems   Constitutional: Negative for appetite change, fatigue and fever. HENT: Negative for congestion, ear pain, postnasal drip and sore throat. Eyes: Negative for pain and visual disturbance. Respiratory: Negative for cough, chest tightness, shortness of breath and wheezing. Cardiovascular: Negative for chest pain, palpitations and leg swelling. Gastrointestinal: Negative for abdominal pain, constipation and nausea. Genitourinary: Negative for dysuria and frequency. Musculoskeletal: Positive for back pain. Negative for arthralgias, joint swelling, neck pain and neck stiffness. Skin: Negative for rash. Neurological: Negative for dizziness, weakness, numbness and headaches. Hematological: Negative for adenopathy. Does not bruise/bleed easily. Psychiatric/Behavioral: Negative for behavioral problems and sleep disturbance. The patient is not nervous/anxious. /66 (Site: Right Upper Arm, Position: Sitting, Cuff Size: Medium Adult)   Pulse 76   Temp 97 °F (36.1 °C)   Resp 14   Ht 5' (1.524 m)   Wt 112 lb 2 oz (50.9 kg)   BMI 21.90 kg/m²   Objective:   Physical Exam  Vitals signs and nursing note reviewed. Constitutional:       Appearance: She is well-developed. HENT:      Head: Normocephalic and atraumatic. Right Ear: External ear normal.      Left Ear: External ear normal.      Nose: Nose normal.   Eyes:      General: No scleral icterus. Conjunctiva/sclera: Conjunctivae normal.      Pupils: Pupils are equal, round, and reactive to light. Neck:      Musculoskeletal: Normal range of motion and neck supple. Thyroid: No thyromegaly. Vascular: No JVD. Cardiovascular:      Rate and Rhythm: Normal rate and regular rhythm. Heart sounds: Normal heart sounds. Pulmonary:      Effort: Pulmonary effort is normal.      Breath sounds: Normal breath sounds. No wheezing or rales. Abdominal:      General: Bowel sounds are normal. There is no distension. Palpations: Abdomen is soft. There is no mass. Tenderness: There is no abdominal tenderness. Musculoskeletal: Normal range of motion. General: No tenderness. Lymphadenopathy:      Cervical: No cervical adenopathy. Skin:     General: Skin is warm and dry. Findings: No rash. Neurological:      Mental Status: She is alert and oriented to person, place, and time. Cranial Nerves: No cranial nerve deficit. Deep Tendon Reflexes: Reflexes are normal and symmetric. Assessment:       Diagnosis Orders   1. Chronic midline low back pain without sciatica     2. Anxiety  PARoxetine (PAXIL) 20 MG tablet   3.  Exudative age-related macular degeneration of left eye, unspecified stage ([facility] [address]     4. Recurrent major depressive disorder, in partial remission (HCC)  PARoxetine (PAXIL) 20 MG tablet   5. Gastroesophageal reflux disease without esophagitis  Comprehensive Metabolic Panel    CBC Auto Differential   6. Pure hypercholesterolemia  Lipid Panel   7. Hypothyroidism due to acquired atrophy of thyroid  T4, Free    TSH without Reflex         Plan:     Current Outpatient Medications   Medication Sig Dispense Refill    PARoxetine (PAXIL) 20 MG tablet Take 0.5 tablets by mouth every morning 90 tablet 0    levothyroxine (SYNTHROID) 50 MCG tablet take 1 tablet by mouth once daily 90 tablet 1    LORazepam (ATIVAN) 0.5 MG tablet take 1 tablet by mouth every 6 hours if needed for anxiety 120 tablet 0    traMADol (ULTRAM) 50 MG tablet take 2 tablets by mouth every 8 hours if needed for pain 180 tablet 0    mirabegron (MYRBETRIQ) 50 MG TB24 take 1 tablet by mouth once daily 90 tablet 1    mirabegron (MYRBETRIQ) 50 MG TB24 take 1 tablet by mouth once daily 90 tablet 1    gabapentin (NEURONTIN) 300 MG capsule Take 1 capsule by mouth nightly for 180 days. Intended supply: 90 days 180 capsule 1    pantoprazole (PROTONIX) 40 MG tablet take 1 tablet by mouth twice daily 30 MINUTES PRIOR TO AM MEAL AND SUPPER 180 tablet 1    Multiple Vitamins-Minerals (PRESERVISION/LUTEIN) CAPS Take 1 capsule by mouth 2 times daily. No current facility-administered medications for this visit.       Orders Placed This Encounter   Procedures    Lipid Panel     Standing Status:   Future     Standing Expiration Date:   4/12/2022     Order Specific Question:   Is Patient Fasting?/# of Hours     Answer:   YES 12 HOURS    Comprehensive Metabolic Panel     Standing Status:   Future     Standing Expiration Date:   4/12/2022    T4, Free     Standing Status:   Future     Standing Expiration Date:   4/12/2022    TSH without Reflex     Standing Status:   Future     Standing Expiration Date:   4/12/2022   [facility] CBC Auto Differential     Standing Status:   Future     Standing Expiration Date:   4/12/2022     No results found for this visit on 04/12/21. Dago Crews received counseling on the following healthy behaviors: nutrition and exercise    Patient given educational materials on Diabetes    I have instructed Dago Crews to complete a self tracking handout on Blood Sugars  and Blood Pressures  and instructed them to bring it with them to her next appointment. Discussed use, benefit, and side effects of prescribed medications. Barriers to medication compliance addressed. All patient questions answered. Pt voiced understanding.          Eveline Weir MD

## 2021-04-14 DIAGNOSIS — G89.4 CHRONIC PAIN SYNDROME: ICD-10-CM

## 2021-04-14 RX ORDER — TRAMADOL HYDROCHLORIDE 50 MG/1
100 TABLET ORAL EVERY 8 HOURS PRN
Qty: 180 TABLET | Refills: 0 | Status: SHIPPED | OUTPATIENT
Start: 2021-04-14 | End: 2021-06-03 | Stop reason: SDUPTHER

## 2021-04-14 NOTE — TELEPHONE ENCOUNTER
Calvin Connolly called requesting a refill on the following medications:  Requested Prescriptions     Pending Prescriptions Disp Refills    traMADol (ULTRAM) 50 MG tablet 180 tablet 0     [facility] [address]    Date of last visit: 2/4/21  Date of next visit (if applicable): 3/2/9583

## 2021-04-14 NOTE — TELEPHONE ENCOUNTER
OARRS reviewed. UDS: + for Gabapentin, Lorazepam, Tramadol. Last seen: 2/4/2021.  Follow-up:   Future Appointments   Date Time Provider Singh Natalya   5/5/2021  4:00 PM KARTHIKEYAN Alvarado - CNP [facility]   7/19/2021  1:40 PM Alexus Lopez MD [facility]

## 2021-05-01 LAB
ABSOLUTE BASO #: 0 X10E9/L (ref 0–0.2)
ABSOLUTE EOS #: 0.2 X10E9/L (ref 0–0.4)
ABSOLUTE LYMPH #: 1.2 X10E9/L (ref 1–3.5)
ABSOLUTE MONO #: 0.3 X10E9/L (ref 0–0.9)
ABSOLUTE NEUT #: 2.5 X10E9/L (ref 1.5–6.6)
ALBUMIN SERPL-MCNC: 4 G/DL (ref 3.2–5.3)
ALK PHOSPHATASE: 66 U/L (ref 39–130)
ALT SERPL-CCNC: 10 U/L (ref 0–31)
ANION GAP SERPL CALCULATED.3IONS-SCNC: 7 MMOL/L (ref 5–15)
AST SERPL-CCNC: 25 U/L (ref 0–41)
BASOPHILS RELATIVE PERCENT: 1.1 %
BILIRUB SERPL-MCNC: 0.5 MG/DL (ref 0.3–1.2)
BUN BLDV-MCNC: 17 MG/DL (ref 5–27)
CALCIUM SERPL-MCNC: 9.6 MG/DL (ref 8.5–10.5)
CHLORIDE BLD-SCNC: 102 MMOL/L (ref 98–109)
CHOLESTEROL/HDL RATIO: 2.9 (ref 1–5)
CHOLESTEROL: 269 MG/DL (ref 150–200)
CO2: 31 MMOL/L (ref 22–32)
CREAT SERPL-MCNC: 1.18 MG/DL (ref 0.4–1)
EGFR AFRICAN AMERICAN: 52 ML/MIN/1.73SQ.M
EGFR IF NONAFRICAN AMERICAN: 43 ML/MIN/1.73SQ.M
EOSINOPHILS RELATIVE PERCENT: 4.8 %
GLUCOSE: 86 MG/DL (ref 65–99)
HCT VFR BLD CALC: 36.3 % (ref 35–47)
HDLC SERPL-MCNC: 92 MG/DL
HEMOGLOBIN: 12.1 G/DL (ref 11.7–15.5)
LDL CHOLESTEROL CALCULATED: 156 MG/DL
LDL/HDL RATIO: 1.7
LYMPHOCYTE %: 27.4 %
MCH RBC QN AUTO: 30.8 PG (ref 27–34)
MCHC RBC AUTO-ENTMCNC: 33.3 G/DL (ref 32–36)
MCV RBC AUTO: 93 FL (ref 80–100)
MONOCYTES # BLD: 8.2 %
NEUTROPHILS RELATIVE PERCENT: 58.5 %
PDW BLD-RTO: 13.9 % (ref 11.5–15)
PLATELETS: 217 X10E9/L (ref 150–450)
PMV BLD AUTO: 8.5 FL (ref 7–12)
POTASSIUM SERPL-SCNC: 4.5 MMOL/L (ref 3.5–5)
RBC: 3.92 X10E12/L (ref 3.8–5.2)
SODIUM BLD-SCNC: 140 MMOL/L (ref 134–146)
T4 FREE: 0.81 NG/DL (ref 0.61–1.6)
TOTAL PROTEIN: 6.9 G/DL (ref 6–8)
TRIGL SERPL-MCNC: 104 MG/DL (ref 27–150)
TSH SERPL DL<=0.05 MIU/L-ACNC: 1.99 UIU/ML (ref 0.49–4.67)
VLDLC SERPL CALC-MCNC: 21 MG/DL (ref 0–30)
WBC: 4.2 X10E9/L (ref 4–11)

## 2021-05-03 ENCOUNTER — TELEPHONE (OUTPATIENT)
Dept: FAMILY MEDICINE CLINIC | Age: 86
End: 2021-05-03

## 2021-05-03 NOTE — TELEPHONE ENCOUNTER
----- Message from Sly Cabrales MD sent at 5/2/2021  2:42 PM EDT -----  Labs all  Good but  Chol 269 as diet as not able to take statins    Please call

## 2021-05-13 DIAGNOSIS — F41.9 ANXIETY: ICD-10-CM

## 2021-05-13 NOTE — TELEPHONE ENCOUNTER
Date of last visit:  4/12/2021  Date of next visit:  7/19/2021    Requested Prescriptions     Pending Prescriptions Disp Refills    LORazepam (ATIVAN) 0.5 MG tablet 120 tablet 0     Sig: take 1 tablet by mouth every 6 hours if needed for anxiety

## 2021-05-14 RX ORDER — LORAZEPAM 0.5 MG/1
TABLET ORAL
Qty: 120 TABLET | Refills: 0 | Status: SHIPPED | OUTPATIENT
Start: 2021-05-14 | End: 2021-08-13 | Stop reason: SDUPTHER

## 2021-05-21 DIAGNOSIS — F33.41 RECURRENT MAJOR DEPRESSIVE DISORDER, IN PARTIAL REMISSION (HCC): ICD-10-CM

## 2021-05-21 DIAGNOSIS — F41.9 ANXIETY: ICD-10-CM

## 2021-05-21 NOTE — TELEPHONE ENCOUNTER
Date of last visit:  4/12/2021  Date of next visit:  7/19/2021    Requested Prescriptions     Pending Prescriptions Disp Refills    PARoxetine (PAXIL) 20 MG tablet [Pharmacy Med Name: PAROXETINE HCL 20 MG TABLET] 90 tablet 1     Sig: take 1 tablet by mouth once daily

## 2021-05-22 RX ORDER — PAROXETINE HYDROCHLORIDE 20 MG/1
TABLET, FILM COATED ORAL
Qty: 90 TABLET | Refills: 1 | Status: SHIPPED | OUTPATIENT
Start: 2021-05-22 | End: 2022-03-08

## 2021-06-01 DIAGNOSIS — G89.4 CHRONIC PAIN SYNDROME: ICD-10-CM

## 2021-06-01 NOTE — TELEPHONE ENCOUNTER
[facility] called requesting a refill on the following medications:  Requested Prescriptions     Pending Prescriptions Disp Refills    traMADol (ULTRAM) 50 MG tablet 180 tablet 0     Sig: Take 2 tablets by mouth every 8 hours as needed for Pain for up to 30 days.      Pharmacy verified:  [facility]    Date of last visit: 02-04-21  Date of next visit (if applicable): Visit date not found

## 2021-06-02 RX ORDER — TRAMADOL HYDROCHLORIDE 50 MG/1
100 TABLET ORAL EVERY 8 HOURS PRN
Qty: 180 TABLET | Refills: 0 | OUTPATIENT
Start: 2021-06-02 | End: 2021-07-02

## 2021-06-03 DIAGNOSIS — G89.4 CHRONIC PAIN SYNDROME: ICD-10-CM

## 2021-06-03 RX ORDER — TRAMADOL HYDROCHLORIDE 50 MG/1
100 TABLET ORAL EVERY 8 HOURS PRN
Qty: 180 TABLET | Refills: 0 | Status: SHIPPED | OUTPATIENT
Start: 2021-06-03 | End: 2021-07-17 | Stop reason: SDUPTHER

## 2021-06-03 NOTE — TELEPHONE ENCOUNTER
Oneal Gilliland called requesting a refill on the following medications:  Requested Prescriptions     Pending Prescriptions Disp Refills    traMADol (ULTRAM) 50 MG tablet 180 tablet 0     Sig: Take 2 tablets by mouth every 8 hours as needed for Pain for up to 30 days. [facility]      Date of last visit: 2/4/2021  Date of next visit (if applicable): 0/8/4988

## 2021-06-03 NOTE — TELEPHONE ENCOUNTER
OARRS reviewed. UDS: + for  Gabapentin, Lorazepam, Tramadol -consistent. Last seen: 2/4/2021.  Follow-up: 8/9/2021

## 2021-06-03 NOTE — TELEPHONE ENCOUNTER
Patient called to check on this prescription. She was advised that it was still pending for the provider and could take up to 3 business days. She stated that she called this in last week and was told that same information so she is wanting someone to follow up with her regarding this.   Please advise  [phone]

## 2021-06-11 ENCOUNTER — TELEPHONE (OUTPATIENT)
Dept: FAMILY MEDICINE CLINIC | Age: 86
End: 2021-06-11

## 2021-06-11 RX ORDER — NITROFURANTOIN 25; 75 MG/1; MG/1
100 CAPSULE ORAL 2 TIMES DAILY
Qty: 20 CAPSULE | Refills: 0 | Status: SHIPPED | OUTPATIENT
Start: 2021-06-11 | End: 2021-06-21

## 2021-06-11 NOTE — TELEPHONE ENCOUNTER
Patient called C/O dysuria, burning. Feels she has a UTI. She stopped taking the Myrbetric because she felt this was hindering her urine output. But did not notice a change after stopping it.     Req RX to [facility].    Please call her back

## 2021-06-11 NOTE — TELEPHONE ENCOUNTER
Date of last visit:  4/12/2021  Date of next visit:  7/19/2021    Requested Prescriptions     Signed Prescriptions Disp Refills    nitrofurantoin, macrocrystal-monohydrate, (MACROBID) 100 MG capsule 20 capsule 0     Sig: Take 1 capsule by mouth 2 times daily for 10 days     Authorizing Provider: Gil Stahl     Ordering User: Kannan Adair informed by Phone.

## 2021-06-22 NOTE — TELEPHONE ENCOUNTER
[facility] pharmacy called to request a Rx of bacoflen for patient. I did not see this as a medication on the patients chart. [facility] states that they called on 06/14/21 and 06/16/21 to have medication filled by leaving messages and haven't heard anything back. Please advise.

## 2021-06-23 ENCOUNTER — NURSE ONLY (OUTPATIENT)
Dept: FAMILY MEDICINE CLINIC | Age: 86
End: 2021-06-23

## 2021-06-23 ENCOUNTER — TELEPHONE (OUTPATIENT)
Dept: FAMILY MEDICINE CLINIC | Age: 86
End: 2021-06-23

## 2021-06-23 DIAGNOSIS — R35.0 URINARY FREQUENCY: Primary | ICD-10-CM

## 2021-06-23 LAB
BACTERIA URINE, POC: ABNORMAL
BILIRUBIN URINE: 0 MG/DL
BLOOD, URINE: NEGATIVE
CASTS URINE, POC: ABNORMAL
CLARITY: ABNORMAL
COLOR: YELLOW
CRYSTALS URINE, POC: ABNORMAL
EPI CELLS URINE, POC: ABNORMAL
GLUCOSE URINE: ABNORMAL
KETONES, URINE: POSITIVE
LEUKOCYTE EST, POC: ABNORMAL
NITRITE, URINE: NEGATIVE
PH UA: 5 (ref 4.5–8)
PROTEIN UA: POSITIVE
RBC URINE, POC: ABNORMAL
SPECIFIC GRAVITY UA: 1.01 (ref 1–1.03)
UROBILINOGEN, URINE: NORMAL
WBC URINE, POC: ABNORMAL
YEAST URINE, POC: ABNORMAL

## 2021-06-23 PROCEDURE — 81000 URINALYSIS NONAUTO W/SCOPE: CPT | Performed by: FAMILY MEDICINE

## 2021-06-23 NOTE — TELEPHONE ENCOUNTER
Let her know the urine specimen does not show any sign of infection. She should increase her water intake and make an appointment to see Dr Arline Greer next week.

## 2021-07-05 ENCOUNTER — OFFICE VISIT (OUTPATIENT)
Dept: FAMILY MEDICINE CLINIC | Age: 86
End: 2021-07-05

## 2021-07-05 VITALS
RESPIRATION RATE: 14 BRPM | SYSTOLIC BLOOD PRESSURE: 136 MMHG | WEIGHT: 110.5 LBS | DIASTOLIC BLOOD PRESSURE: 74 MMHG | BODY MASS INDEX: 21.69 KG/M2 | HEART RATE: 88 BPM | HEIGHT: 60 IN | TEMPERATURE: 97.4 F

## 2021-07-05 DIAGNOSIS — F33.41 RECURRENT MAJOR DEPRESSIVE DISORDER, IN PARTIAL REMISSION (HCC): ICD-10-CM

## 2021-07-05 DIAGNOSIS — M54.50 CHRONIC MIDLINE LOW BACK PAIN WITHOUT SCIATICA: Primary | ICD-10-CM

## 2021-07-05 DIAGNOSIS — M81.0 AGE-RELATED OSTEOPOROSIS WITHOUT CURRENT PATHOLOGICAL FRACTURE: ICD-10-CM

## 2021-07-05 DIAGNOSIS — G89.29 CHRONIC MIDLINE LOW BACK PAIN WITHOUT SCIATICA: Primary | ICD-10-CM

## 2021-07-05 DIAGNOSIS — K21.9 GASTROESOPHAGEAL REFLUX DISEASE WITHOUT ESOPHAGITIS: ICD-10-CM

## 2021-07-05 DIAGNOSIS — G60.9 IDIOPATHIC PERIPHERAL NEUROPATHY: ICD-10-CM

## 2021-07-05 PROCEDURE — G8420 CALC BMI NORM PARAMETERS: HCPCS | Performed by: FAMILY MEDICINE

## 2021-07-05 PROCEDURE — 1123F ACP DISCUSS/DSCN MKR DOCD: CPT | Performed by: FAMILY MEDICINE

## 2021-07-05 PROCEDURE — 4040F PNEUMOC VAC/ADMIN/RCVD: CPT | Performed by: FAMILY MEDICINE

## 2021-07-05 PROCEDURE — 99213 OFFICE O/P EST LOW 20 MIN: CPT | Performed by: FAMILY MEDICINE

## 2021-07-05 PROCEDURE — 1036F TOBACCO NON-USER: CPT | Performed by: FAMILY MEDICINE

## 2021-07-05 PROCEDURE — 1090F PRES/ABSN URINE INCON ASSESS: CPT | Performed by: FAMILY MEDICINE

## 2021-07-05 PROCEDURE — G8427 DOCREV CUR MEDS BY ELIG CLIN: HCPCS | Performed by: FAMILY MEDICINE

## 2021-07-05 SDOH — ECONOMIC STABILITY: FOOD INSECURITY: WITHIN THE PAST 12 MONTHS, THE FOOD YOU BOUGHT JUST DIDN'T LAST AND YOU DIDN'T HAVE MONEY TO GET MORE.: NEVER TRUE

## 2021-07-05 SDOH — ECONOMIC STABILITY: FOOD INSECURITY: WITHIN THE PAST 12 MONTHS, YOU WORRIED THAT YOUR FOOD WOULD RUN OUT BEFORE YOU GOT MONEY TO BUY MORE.: NEVER TRUE

## 2021-07-05 ASSESSMENT — ENCOUNTER SYMPTOMS
SHORTNESS OF BREATH: 0
CONSTIPATION: 0
HOARSE VOICE: 0
ABDOMINAL PAIN: 0
NAUSEA: 0
EYE PAIN: 0
COUGH: 0
STRIDOR: 0
CHEST TIGHTNESS: 0
WHEEZING: 0
SORE THROAT: 0

## 2021-07-05 ASSESSMENT — SOCIAL DETERMINANTS OF HEALTH (SDOH): HOW HARD IS IT FOR YOU TO PAY FOR THE VERY BASICS LIKE FOOD, HOUSING, MEDICAL CARE, AND HEATING?: NOT HARD AT ALL

## 2021-07-05 NOTE — PROGRESS NOTES
Subjective:      Patient ID: Mary Smith is a 80 y.o. female. Urine    Issues  Stable   Some    Hesitancy    And  macobid       depression and  Anxiety       Pain  4  Day  For  The    Tramadol     For    Right  Shoulder  Chronic  Pain and    Chronic  Low  Back pain      Right  Shoulder  With  Rotator    Cuff     Hypothyroid  Stable         Gastroesophageal Reflux  She reports no abdominal pain, no chest pain, no coughing, no hoarse voice, no nausea, no sore throat, no stridor or no wheezing. This is a chronic problem. The current episode started more than 1 year ago. The problem occurs rarely. The problem has been resolved. The symptoms are aggravated by certain foods. Pertinent negatives include no fatigue. She has tried a PPI for the symptoms. The treatment provided significant relief. Past Medical History:   Diagnosis Date    Acute blood loss as cause of postoperative anemia 12/2/2017    Alcohol abuse sober since [place]    Depression     Gastric ulcer 11/2017     nathanael     GERD (gastroesophageal reflux disease)     PUD 1998 Dr. Vinh Yip    Hyperlipidemia     Hypertension 12/3/2017    Hypertension 12/3/2017    Hypothyroid     Incisional hernia of anterior abdominal wall without obstruction or gangrene     Neuropathy, peripheral     Osteoarthritis     Osteoporosis     Pneumoperitoneum 11/29/2017    Thyroid disease     hypothyroid    Vitamin D deficiency      Review of Systems   Constitutional: Negative for appetite change, fatigue and fever. HENT: Negative for congestion, ear pain, hoarse voice, postnasal drip and sore throat. Eyes: Negative for pain and visual disturbance. Respiratory: Negative for cough, chest tightness, shortness of breath and wheezing. Cardiovascular: Negative for chest pain, palpitations and leg swelling. Gastrointestinal: Negative for abdominal pain, constipation and nausea.    Genitourinary: Negative for dysuria and frequency. Musculoskeletal: Negative for arthralgias, joint swelling, neck pain and neck stiffness. Skin: Negative for rash. Neurological: Negative for dizziness, weakness, numbness and headaches. Hematological: Negative for adenopathy. Does not bruise/bleed easily. Psychiatric/Behavioral: Negative for behavioral problems and sleep disturbance. The patient is not nervous/anxious. /74 (Site: Right Upper Arm, Position: Sitting, Cuff Size: Medium Adult)   Pulse 88   Temp 97.4 °F (36.3 °C) (Infrared)   Resp 14   Ht 5' (1.524 m)   Wt 110 lb 8 oz (50.1 kg)   BMI 21.58 kg/m²   Objective:   Physical Exam  Vitals and nursing note reviewed. Constitutional:       Appearance: She is well-developed. HENT:      Head: Normocephalic and atraumatic. Right Ear: External ear normal.      Left Ear: External ear normal.      Nose: Nose normal.   Eyes:      General: No scleral icterus. Conjunctiva/sclera: Conjunctivae normal.      Pupils: Pupils are equal, round, and reactive to light. Neck:      Thyroid: No thyromegaly. Vascular: No JVD. Cardiovascular:      Rate and Rhythm: Normal rate and regular rhythm. Heart sounds: Normal heart sounds. Pulmonary:      Effort: Pulmonary effort is normal.      Breath sounds: Normal breath sounds. No wheezing or rales. Abdominal:      General: Bowel sounds are normal. There is no distension. Palpations: Abdomen is soft. There is no mass. Tenderness: There is no abdominal tenderness. Musculoskeletal:         General: No tenderness. Normal range of motion. Cervical back: Normal range of motion and neck supple. Lymphadenopathy:      Cervical: No cervical adenopathy. Skin:     General: Skin is warm and dry. Findings: No rash. Neurological:      Mental Status: She is alert and oriented to person, place, and time. Cranial Nerves: No cranial nerve deficit.       Deep Tendon Reflexes: Reflexes are normal and symmetric. Assessment:       Diagnosis Orders   1. Chronic midline low back pain without sciatica     2. Recurrent major depressive disorder, in partial remission ([facility] [address]     3. Gastroesophageal reflux disease without esophagitis     4. Age-related osteoporosis without current pathological fracture     5. Idiopathic peripheral neuropathy           Plan:      Current Outpatient Medications   Medication Sig Dispense Refill    PARoxetine (PAXIL) 20 MG tablet take 1 tablet by mouth once daily (Patient taking differently: Take 10 mg by mouth every morning ) 90 tablet 1    levothyroxine (SYNTHROID) 50 MCG tablet take 1 tablet by mouth once daily 90 tablet 1    mirabegron (MYRBETRIQ) 50 MG TB24 take 1 tablet by mouth once daily 90 tablet 1    gabapentin (NEURONTIN) 300 MG capsule Take 1 capsule by mouth nightly for 180 days. Intended supply: 90 days 180 capsule 1    pantoprazole (PROTONIX) 40 MG tablet take 1 tablet by mouth twice daily 30 MINUTES PRIOR TO AM MEAL AND SUPPER 180 tablet 1    Multiple Vitamins-Minerals (PRESERVISION/LUTEIN) CAPS Take 1 capsule by mouth 2 times daily. No current facility-administered medications for this visit.      Se [address]obedo  3  [facility] as  Will  Do edwingg        Sintia Kemp MD

## 2021-07-16 DIAGNOSIS — G89.4 CHRONIC PAIN SYNDROME: ICD-10-CM

## 2021-07-16 NOTE — TELEPHONE ENCOUNTER
Date of last visit:  7/5/2021  Date of next visit:  10/5/2021    Requested Prescriptions     Pending Prescriptions Disp Refills    traMADol (ULTRAM) 50 MG tablet 180 tablet 0     Sig: Take 2 tablets by mouth every 8 hours as needed for Pain for up to 30 days.

## 2021-07-17 RX ORDER — TRAMADOL HYDROCHLORIDE 50 MG/1
100 TABLET ORAL EVERY 8 HOURS PRN
Qty: 180 TABLET | Refills: 0 | Status: SHIPPED | OUTPATIENT
Start: 2021-07-17 | End: 2021-08-31 | Stop reason: SDUPTHER

## 2021-08-12 DIAGNOSIS — F41.9 ANXIETY: ICD-10-CM

## 2021-08-12 NOTE — TELEPHONE ENCOUNTER
Date of last visit:  7/5/2021  Date of next visit:  10/5/2021    Requested Prescriptions     Pending Prescriptions Disp Refills    LORazepam (ATIVAN) 0.5 MG tablet 120 tablet 0     Sig: take 1 tablet by mouth every 6 hours if needed for anxiety

## 2021-08-13 RX ORDER — LORAZEPAM 0.5 MG/1
TABLET ORAL
Qty: 120 TABLET | Refills: 0 | Status: SHIPPED | OUTPATIENT
Start: 2021-08-13 | End: 2021-10-05 | Stop reason: SDUPTHER

## 2021-08-30 DIAGNOSIS — G89.4 CHRONIC PAIN SYNDROME: ICD-10-CM

## 2021-08-31 RX ORDER — TRAMADOL HYDROCHLORIDE 50 MG/1
100 TABLET ORAL EVERY 8 HOURS PRN
Qty: 180 TABLET | Refills: 0 | Status: SHIPPED | OUTPATIENT
Start: 2021-08-31 | End: 2021-10-15 | Stop reason: SDUPTHER

## 2021-09-09 DIAGNOSIS — N31.9 NEUROGENIC BLADDER: ICD-10-CM

## 2021-09-09 NOTE — TELEPHONE ENCOUNTER
Date of last visit:  7/5/2021  Date of next visit:  10/5/2021    Requested Prescriptions     Pending Prescriptions Disp Refills    mirabegron (MYRBETRIQ) 50 MG TB24 90 tablet 1     Sig: take 1 tablet by mouth once daily

## 2021-09-11 DIAGNOSIS — E03.4 HYPOTHYROIDISM DUE TO ACQUIRED ATROPHY OF THYROID: ICD-10-CM

## 2021-09-13 NOTE — TELEPHONE ENCOUNTER
Date of last visit:  7/5/2021  Date of next visit:  10/5/2021    Requested Prescriptions     Pending Prescriptions Disp Refills    levothyroxine (SYNTHROID) 50 MCG tablet [Pharmacy Med Name: LEVOTHYROXINE 50 MCG TABLET] 90 tablet 1     Sig: take 1 tablet by mouth once daily

## 2021-09-14 RX ORDER — LEVOTHYROXINE SODIUM 0.05 MG/1
TABLET ORAL
Qty: 90 TABLET | Refills: 1 | Status: SHIPPED | OUTPATIENT
Start: 2021-09-14 | End: 2022-03-08

## 2021-10-05 ENCOUNTER — OFFICE VISIT (OUTPATIENT)
Dept: FAMILY MEDICINE CLINIC | Age: 86
End: 2021-10-05

## 2021-10-05 VITALS
HEIGHT: 60 IN | SYSTOLIC BLOOD PRESSURE: 122 MMHG | BODY MASS INDEX: 22.01 KG/M2 | DIASTOLIC BLOOD PRESSURE: 66 MMHG | WEIGHT: 112.13 LBS | RESPIRATION RATE: 16 BRPM | TEMPERATURE: 97.5 F | HEART RATE: 80 BPM

## 2021-10-05 DIAGNOSIS — F41.9 ANXIETY: ICD-10-CM

## 2021-10-05 DIAGNOSIS — F33.41 RECURRENT MAJOR DEPRESSIVE DISORDER, IN PARTIAL REMISSION (HCC): ICD-10-CM

## 2021-10-05 DIAGNOSIS — E55.9 VITAMIN D DEFICIENCY: ICD-10-CM

## 2021-10-05 DIAGNOSIS — M19.041 PRIMARY OSTEOARTHRITIS OF BOTH HANDS: ICD-10-CM

## 2021-10-05 DIAGNOSIS — K21.9 GASTROESOPHAGEAL REFLUX DISEASE WITHOUT ESOPHAGITIS: ICD-10-CM

## 2021-10-05 DIAGNOSIS — M54.50 CHRONIC MIDLINE LOW BACK PAIN WITHOUT SCIATICA: Primary | ICD-10-CM

## 2021-10-05 DIAGNOSIS — E78.00 PURE HYPERCHOLESTEROLEMIA: ICD-10-CM

## 2021-10-05 DIAGNOSIS — N31.9 NEUROGENIC BLADDER: ICD-10-CM

## 2021-10-05 DIAGNOSIS — G89.29 CHRONIC MIDLINE LOW BACK PAIN WITHOUT SCIATICA: Primary | ICD-10-CM

## 2021-10-05 DIAGNOSIS — E03.4 HYPOTHYROIDISM DUE TO ACQUIRED ATROPHY OF THYROID: ICD-10-CM

## 2021-10-05 DIAGNOSIS — M19.042 PRIMARY OSTEOARTHRITIS OF BOTH HANDS: ICD-10-CM

## 2021-10-05 DIAGNOSIS — R09.81 NASAL CONGESTION: ICD-10-CM

## 2021-10-05 PROCEDURE — 99213 OFFICE O/P EST LOW 20 MIN: CPT | Performed by: FAMILY MEDICINE

## 2021-10-05 PROCEDURE — G8427 DOCREV CUR MEDS BY ELIG CLIN: HCPCS | Performed by: FAMILY MEDICINE

## 2021-10-05 PROCEDURE — 1090F PRES/ABSN URINE INCON ASSESS: CPT | Performed by: FAMILY MEDICINE

## 2021-10-05 PROCEDURE — 1123F ACP DISCUSS/DSCN MKR DOCD: CPT | Performed by: FAMILY MEDICINE

## 2021-10-05 PROCEDURE — G8420 CALC BMI NORM PARAMETERS: HCPCS | Performed by: FAMILY MEDICINE

## 2021-10-05 PROCEDURE — 4040F PNEUMOC VAC/ADMIN/RCVD: CPT | Performed by: FAMILY MEDICINE

## 2021-10-05 PROCEDURE — 1036F TOBACCO NON-USER: CPT | Performed by: FAMILY MEDICINE

## 2021-10-05 RX ORDER — IPRATROPIUM BROMIDE 21 UG/1
2 SPRAY, METERED NASAL EVERY 12 HOURS
Qty: 30 ML | Refills: 3 | Status: SHIPPED | OUTPATIENT
Start: 2021-10-05 | End: 2022-01-11 | Stop reason: SDUPTHER

## 2021-10-05 RX ORDER — LORAZEPAM 0.5 MG/1
TABLET ORAL
Qty: 120 TABLET | Refills: 0 | Status: SHIPPED | OUTPATIENT
Start: 2021-10-05 | End: 2021-12-14 | Stop reason: SDUPTHER

## 2021-10-05 ASSESSMENT — ENCOUNTER SYMPTOMS
CONSTIPATION: 0
EYE PAIN: 0
ABDOMINAL PAIN: 0
SHORTNESS OF BREATH: 0
COUGH: 0
WHEEZING: 0
SORE THROAT: 0
NAUSEA: 0
CHEST TIGHTNESS: 0
GLOBUS SENSATION: 0

## 2021-10-05 NOTE — PROGRESS NOTES
Subjective:      Patient ID: Lalito Head is a 80 y.o. female. Right  Shoulder  Pain  Noted   With pain  Chronic and use  Of  cream      Thyroid  stabl e      Anxiety  Stable         Gastroesophageal Reflux  She reports no abdominal pain, no chest pain, no coughing, no dysphagia, no globus sensation, no nausea, no sore throat or no wheezing. This is a chronic problem. The current episode started more than 1 year ago. The problem occurs rarely. The problem has been resolved. The symptoms are aggravated by certain foods. Pertinent negatives include no fatigue. She has tried a PPI for the symptoms. The treatment provided significant relief. Past Medical History:   Diagnosis Date    Acute blood loss as cause of postoperative anemia 12/2/2017    Alcohol abuse sober since [place] polyp     Dr. Wes Murphy    Depression     Gastric ulcer 11/2017     nathanael     GERD (gastroesophageal reflux disease)     PUD 1998 Dr. Dewitte Epley    Hyperlipidemia     Hypertension 12/3/2017    Hypertension 12/3/2017    Hypothyroid     Incisional hernia of anterior abdominal wall without obstruction or gangrene     Neuropathy, peripheral     Osteoarthritis     Osteoporosis     Pneumoperitoneum 11/29/2017    Thyroid disease     hypothyroid    Vitamin D deficiency      Review of Systems   Constitutional: Negative for appetite change, fatigue and fever. HENT: Negative for congestion, ear pain, postnasal drip and sore throat. Eyes: Negative for pain and visual disturbance. Respiratory: Negative for cough, chest tightness, shortness of breath and wheezing. Cardiovascular: Negative for chest pain, palpitations and leg swelling. Gastrointestinal: Negative for abdominal pain, constipation, dysphagia and nausea. Genitourinary: Negative for dysuria and frequency. Musculoskeletal: Negative for arthralgias, joint swelling, neck pain and neck stiffness. Skin: Negative for rash. Neurological: Negative for dizziness, weakness, numbness and headaches. Hematological: Negative for adenopathy. Does not bruise/bleed easily. Psychiatric/Behavioral: Negative for behavioral problems and sleep disturbance. The patient is not nervous/anxious. /66 (Site: Right Upper Arm, Position: Sitting, Cuff Size: Medium Adult)   Pulse 80   Temp 97.5 °F (36.4 °C) (Infrared)   Resp 16   Ht 5' (1.524 m)   Wt 112 lb 2 oz (50.9 kg)   BMI 21.90 kg/m²   Objective:   Physical Exam  Vitals and nursing note reviewed. Constitutional:       Appearance: She is well-developed. HENT:      Head: Normocephalic and atraumatic. Right Ear: External ear normal.      Left Ear: External ear normal.      Nose: Nose normal.   Eyes:      General: No scleral icterus. Conjunctiva/sclera: Conjunctivae normal.      Pupils: Pupils are equal, round, and reactive to light. Neck:      Thyroid: No thyromegaly. Vascular: No JVD. Cardiovascular:      Rate and Rhythm: Normal rate and regular rhythm. Heart sounds: Normal heart sounds. Pulmonary:      Effort: Pulmonary effort is normal.      Breath sounds: Normal breath sounds. No wheezing or rales. Abdominal:      General: Bowel sounds are normal. There is no distension. Palpations: Abdomen is soft. There is no mass. Tenderness: There is no abdominal tenderness. Musculoskeletal:         General: No tenderness. Normal range of motion. Cervical back: Normal range of motion and neck supple. Comments:   Right  Shoulder pain noted        Tramadol  For  Right shoulder and  The  Back    Lymphadenopathy:      Cervical: No cervical adenopathy. Skin:     General: Skin is warm and dry. Findings: No rash. Neurological:      Mental Status: She is alert and oriented to person, place, and time. Cranial Nerves: No cranial nerve deficit. Deep Tendon Reflexes: Reflexes are normal and symmetric.          Assessment: Diagnosis Orders   1. Chronic midline low back pain without sciatica     2. Anxiety  LORazepam (ATIVAN) 0.5 MG tablet   3. Recurrent major depressive disorder, in partial remission ([facility] [address]     4. Neurogenic bladder     5. Hypothyroidism due to acquired atrophy of thyroid     6. Vitamin D deficiency     7. Primary osteoarthritis of both hands     8. Gastroesophageal reflux disease without esophagitis     9. Pure hypercholesterolemia     10. Nasal congestion  ipratropium (ATROVENT) 0.03 % nasal spray         Plan:      Current Outpatient Medications   Medication Sig Dispense Refill    LORazepam (ATIVAN) 0.5 MG tablet take 1 tablet by mouth every 6 hours if needed for anxiety 120 tablet 0    ipratropium (ATROVENT) 0.03 % nasal spray 2 sprays by Each Nostril route every 12 hours 30 mL 3    levothyroxine (SYNTHROID) 50 MCG tablet take 1 tablet by mouth once daily 90 tablet 1    mirabegron (MYRBETRIQ) 50 MG TB24 take 1 tablet by mouth once daily 90 tablet 1    PARoxetine (PAXIL) 20 MG tablet take 1 tablet by mouth once daily (Patient taking differently: Take 10 mg by mouth every morning ) 90 tablet 1    gabapentin (NEURONTIN) 300 MG capsule Take 1 capsule by mouth nightly for 180 days. Intended supply: 90 days 180 capsule 1    pantoprazole (PROTONIX) 40 MG tablet take 1 tablet by mouth twice daily 30 MINUTES PRIOR TO AM MEAL AND SUPPER (Patient taking differently: take 1 tablet by mouth once daily 30 MINUTES PRIOR TO AM MEAL) 180 tablet 1    Multiple Vitamins-Minerals (PRESERVISION/LUTEIN) CAPS Take 1 capsule by mouth 2 times daily. No current facility-administered medications for this visit. No orders of the defined types were placed in this encounter. No results found for this visit on 10/05/21. No orders of the defined types were placed in this encounter.     Alize Thomas MD

## 2021-10-08 RX ORDER — PANTOPRAZOLE SODIUM 40 MG/1
TABLET, DELAYED RELEASE ORAL
Qty: 180 TABLET | Refills: 1 | Status: SHIPPED | OUTPATIENT
Start: 2021-10-08 | End: 2022-01-27 | Stop reason: SDUPTHER

## 2021-10-15 DIAGNOSIS — G89.4 CHRONIC PAIN SYNDROME: ICD-10-CM

## 2021-10-15 RX ORDER — TRAMADOL HYDROCHLORIDE 50 MG/1
100 TABLET ORAL EVERY 8 HOURS PRN
Qty: 180 TABLET | Refills: 0 | Status: SHIPPED | OUTPATIENT
Start: 2021-10-15 | End: 2021-12-01 | Stop reason: SDUPTHER

## 2021-10-29 DIAGNOSIS — G60.9 IDIOPATHIC PERIPHERAL NEUROPATHY: ICD-10-CM

## 2021-10-29 RX ORDER — GABAPENTIN 300 MG/1
300 CAPSULE ORAL NIGHTLY
Qty: 90 CAPSULE | Refills: 0 | Status: SHIPPED | OUTPATIENT
Start: 2021-10-29 | End: 2022-01-28 | Stop reason: SDUPTHER

## 2021-11-29 DIAGNOSIS — G89.4 CHRONIC PAIN SYNDROME: ICD-10-CM

## 2021-11-29 NOTE — TELEPHONE ENCOUNTER
Date of last visit:  10/5/2021  Date of next visit:  1/6/2022    Requested Prescriptions     Pending Prescriptions Disp Refills    traMADol (ULTRAM) 50 MG tablet 180 tablet 0     Sig: Take 2 tablets by mouth every 8 hours as needed for Pain for up to 30 days.

## 2021-12-01 RX ORDER — TRAMADOL HYDROCHLORIDE 50 MG/1
100 TABLET ORAL EVERY 8 HOURS PRN
Qty: 180 TABLET | Refills: 0 | Status: SHIPPED | OUTPATIENT
Start: 2021-12-01 | End: 2022-01-19 | Stop reason: SDUPTHER

## 2021-12-13 DIAGNOSIS — F41.9 ANXIETY: ICD-10-CM

## 2021-12-13 NOTE — TELEPHONE ENCOUNTER
Date of last visit:  10/5/2021  Date of next visit:  1/6/2022    Requested Prescriptions     Pending Prescriptions Disp Refills    LORazepam (ATIVAN) 0.5 MG tablet 120 tablet 0     Sig: take 1 tablet by mouth every 6 hours if needed for anxiety

## 2021-12-14 RX ORDER — LORAZEPAM 0.5 MG/1
TABLET ORAL
Qty: 120 TABLET | Refills: 0 | Status: SHIPPED | OUTPATIENT
Start: 2021-12-14 | End: 2022-02-11 | Stop reason: SDUPTHER

## 2022-01-11 ENCOUNTER — OFFICE VISIT (OUTPATIENT)
Dept: FAMILY MEDICINE CLINIC | Age: 87
End: 2022-01-11

## 2022-01-11 VITALS
TEMPERATURE: 96.4 F | WEIGHT: 114 LBS | HEART RATE: 68 BPM | SYSTOLIC BLOOD PRESSURE: 128 MMHG | BODY MASS INDEX: 22.38 KG/M2 | RESPIRATION RATE: 10 BRPM | HEIGHT: 60 IN | DIASTOLIC BLOOD PRESSURE: 68 MMHG

## 2022-01-11 DIAGNOSIS — E55.9 VITAMIN D DEFICIENCY: ICD-10-CM

## 2022-01-11 DIAGNOSIS — F41.9 ANXIETY: ICD-10-CM

## 2022-01-11 DIAGNOSIS — G89.29 CHRONIC MIDLINE LOW BACK PAIN WITHOUT SCIATICA: ICD-10-CM

## 2022-01-11 DIAGNOSIS — R09.81 NASAL CONGESTION: ICD-10-CM

## 2022-01-11 DIAGNOSIS — M81.0 AGE-RELATED OSTEOPOROSIS WITHOUT CURRENT PATHOLOGICAL FRACTURE: ICD-10-CM

## 2022-01-11 DIAGNOSIS — E03.4 HYPOTHYROIDISM DUE TO ACQUIRED ATROPHY OF THYROID: ICD-10-CM

## 2022-01-11 DIAGNOSIS — N31.9 NEUROGENIC BLADDER: ICD-10-CM

## 2022-01-11 DIAGNOSIS — M54.50 CHRONIC MIDLINE LOW BACK PAIN WITHOUT SCIATICA: ICD-10-CM

## 2022-01-11 DIAGNOSIS — G89.4 CHRONIC PAIN SYNDROME: Primary | ICD-10-CM

## 2022-01-11 PROCEDURE — G8420 CALC BMI NORM PARAMETERS: HCPCS | Performed by: FAMILY MEDICINE

## 2022-01-11 PROCEDURE — 4040F PNEUMOC VAC/ADMIN/RCVD: CPT | Performed by: FAMILY MEDICINE

## 2022-01-11 PROCEDURE — 99213 OFFICE O/P EST LOW 20 MIN: CPT | Performed by: FAMILY MEDICINE

## 2022-01-11 PROCEDURE — 1123F ACP DISCUSS/DSCN MKR DOCD: CPT | Performed by: FAMILY MEDICINE

## 2022-01-11 PROCEDURE — 1090F PRES/ABSN URINE INCON ASSESS: CPT | Performed by: FAMILY MEDICINE

## 2022-01-11 PROCEDURE — G8427 DOCREV CUR MEDS BY ELIG CLIN: HCPCS | Performed by: FAMILY MEDICINE

## 2022-01-11 PROCEDURE — 1036F TOBACCO NON-USER: CPT | Performed by: FAMILY MEDICINE

## 2022-01-11 RX ORDER — IPRATROPIUM BROMIDE 21 UG/1
2 SPRAY, METERED NASAL EVERY 12 HOURS
Qty: 30 ML | Refills: 5 | Status: SHIPPED | OUTPATIENT
Start: 2022-01-11

## 2022-01-11 RX ORDER — DENOSUMAB 60 MG/ML
60 INJECTION SUBCUTANEOUS ONCE
Qty: 1 ML | Refills: 1 | Status: SHIPPED | OUTPATIENT
Start: 2022-01-11 | End: 2022-04-19

## 2022-01-11 ASSESSMENT — ENCOUNTER SYMPTOMS
COUGH: 0
ABDOMINAL PAIN: 0
SORE THROAT: 0
SHORTNESS OF BREATH: 0
ORTHOPNEA: 0
CHEST TIGHTNESS: 0
WHEEZING: 0
NAUSEA: 0
CONSTIPATION: 0
EYE PAIN: 0

## 2022-01-11 ASSESSMENT — PATIENT HEALTH QUESTIONNAIRE - PHQ9
SUM OF ALL RESPONSES TO PHQ QUESTIONS 1-9: 0
SUM OF ALL RESPONSES TO PHQ QUESTIONS 1-9: 0
8. MOVING OR SPEAKING SO SLOWLY THAT OTHER PEOPLE COULD HAVE NOTICED. OR THE OPPOSITE, BEING SO FIGETY OR RESTLESS THAT YOU HAVE BEEN MOVING AROUND A LOT MORE THAN USUAL: 0
6. FEELING BAD ABOUT YOURSELF - OR THAT YOU ARE A FAILURE OR HAVE LET YOURSELF OR YOUR FAMILY DOWN: 0
9. THOUGHTS THAT YOU WOULD BE BETTER OFF DEAD, OR OF HURTING YOURSELF: 0
7. TROUBLE CONCENTRATING ON THINGS, SUCH AS READING THE NEWSPAPER OR WATCHING TELEVISION: 0
1. LITTLE INTEREST OR PLEASURE IN DOING THINGS: 0
2. FEELING DOWN, DEPRESSED OR HOPELESS: 0
10. IF YOU CHECKED OFF ANY PROBLEMS, HOW DIFFICULT HAVE THESE PROBLEMS MADE IT FOR YOU TO DO YOUR WORK, TAKE CARE OF THINGS AT HOME, OR GET ALONG WITH OTHER PEOPLE: 0
SUM OF ALL RESPONSES TO PHQ QUESTIONS 1-9: 0
4. FEELING TIRED OR HAVING LITTLE ENERGY: 0
3. TROUBLE FALLING OR STAYING ASLEEP: 0
SUM OF ALL RESPONSES TO PHQ QUESTIONS 1-9: 0
5. POOR APPETITE OR OVEREATING: 0
SUM OF ALL RESPONSES TO PHQ9 QUESTIONS 1 & 2: 0

## 2022-01-11 NOTE — PROGRESS NOTES
Subjective:      Patient ID: Demetrius August is a 80 y.o. female. back pain   Stable     now  Right  Hip and  Shoulder   Right      thyroid   Stable       Anxiety  Stable and ativan  Bid     Hypertension  This is a chronic problem. The current episode started more than 1 year ago. The problem has been resolved since onset. The problem is controlled. Pertinent negatives include no chest pain, headaches, neck pain, orthopnea, palpitations, peripheral edema or shortness of breath. The current treatment provides significant improvement. There are no compliance problems. Gastroesophageal Reflux  She reports no abdominal pain, no chest pain, no coughing, no nausea, no sore throat or no wheezing. Pertinent negatives include no fatigue. Past Medical History:   Diagnosis Date    Acute blood loss as cause of postoperative anemia 12/2/2017    Alcohol abuse sober since [place] polyp     Dr. Nicole Stone    Depression     Gastric ulcer 11/2017     nathanael     GERD (gastroesophageal reflux disease)     PUD 1998 Dr. Jorge Carmona    Hyperlipidemia     Hypertension 12/3/2017    Hypertension 12/3/2017    Hypothyroid     Incisional hernia of anterior abdominal wall without obstruction or gangrene     Neuropathy, peripheral     Osteoarthritis     Osteoporosis     Pneumoperitoneum 11/29/2017    Thyroid disease     hypothyroid    Vitamin D deficiency        Review of Systems   Constitutional: Negative for appetite change, fatigue and fever. HENT: Negative for congestion, ear pain, postnasal drip and sore throat. Eyes: Negative for pain and visual disturbance. Respiratory: Negative for cough, chest tightness, shortness of breath and wheezing. Cardiovascular: Negative for chest pain, palpitations, orthopnea and leg swelling. Gastrointestinal: Negative for abdominal pain, constipation and nausea. Genitourinary: Negative for dysuria and frequency.    Musculoskeletal: Negative for 1. Chronic pain syndrome     2. Nasal congestion  ipratropium (ATROVENT) 0.03 % nasal spray   3. Anxiety     4. Neurogenic bladder     5. Hypothyroidism due to acquired atrophy of thyroid     6. Age-related osteoporosis without current pathological fracture  denosumab (PROLIA) 60 MG/ML SOSY SC injection    BUN & Creatinine    Calcium   7. Chronic midline low back pain without sciatica     8. Vitamin D deficiency           Plan:      Current Outpatient Medications   Medication Sig Dispense Refill    ipratropium (ATROVENT) 0.03 % nasal spray 2 sprays by Each Nostril route every 12 hours 30 mL 5    denosumab (PROLIA) 60 MG/ML SOSY SC injection Inject 1 mL into the skin once for 1 dose 1 mL 1    LORazepam (ATIVAN) 0.5 MG tablet take 1 tablet by mouth every 6 hours if needed for anxiety 120 tablet 0    gabapentin (NEURONTIN) 300 MG capsule Take 1 capsule by mouth nightly for 90 days. Intended supply: 90 days 90 capsule 0    pantoprazole (PROTONIX) 40 MG tablet take 1 tablet by mouth twice daily 30 MINUTES PRIOR TO AM MEAL AND SUPPER 180 tablet 1    levothyroxine (SYNTHROID) 50 MCG tablet take 1 tablet by mouth once daily 90 tablet 1    mirabegron (MYRBETRIQ) 50 MG TB24 take 1 tablet by mouth once daily 90 tablet 1    PARoxetine (PAXIL) 20 MG tablet take 1 tablet by mouth once daily (Patient taking differently: Take 10 mg by mouth every morning ) 90 tablet 1    Multiple Vitamins-Minerals (PRESERVISION/LUTEIN) CAPS Take 1 capsule by mouth 2 times daily. No current facility-administered medications for this visit. No results found for this visit on 01/11/22.   Orders Placed This Encounter   Procedures    BUN & Creatinine     Standing Status:   Future     Standing Expiration Date:   1/11/2023    Calcium     Standing Status:   Future     Standing Expiration Date:   1/11/2023     Evi Raymond MD

## 2022-01-18 DIAGNOSIS — G89.4 CHRONIC PAIN SYNDROME: ICD-10-CM

## 2022-01-18 NOTE — TELEPHONE ENCOUNTER
Date of last visit:  1/11/2022  Date of next visit:  4/11/2022    Requested Prescriptions     Pending Prescriptions Disp Refills    traMADol (ULTRAM) 50 MG tablet 180 tablet 0     Sig: Take 2 tablets by mouth every 8 hours as needed for Pain for up to 30 days.

## 2022-01-19 RX ORDER — TRAMADOL HYDROCHLORIDE 50 MG/1
100 TABLET ORAL EVERY 8 HOURS PRN
Qty: 180 TABLET | Refills: 0 | Status: SHIPPED | OUTPATIENT
Start: 2022-01-19 | End: 2022-03-04 | Stop reason: SDUPTHER

## 2022-01-27 ENCOUNTER — NURSE ONLY (OUTPATIENT)
Dept: LAB | Age: 87
End: 2022-01-27

## 2022-01-27 DIAGNOSIS — M81.0 AGE-RELATED OSTEOPOROSIS WITHOUT CURRENT PATHOLOGICAL FRACTURE: ICD-10-CM

## 2022-01-27 DIAGNOSIS — G60.9 IDIOPATHIC PERIPHERAL NEUROPATHY: ICD-10-CM

## 2022-01-27 LAB
BUN BLDV-MCNC: 18 MG/DL (ref 7–22)
CALCIUM SERPL-MCNC: 9.5 MG/DL (ref 8.5–10.5)
CREAT SERPL-MCNC: 1.1 MG/DL (ref 0.4–1.2)
GFR SERPL CREATININE-BSD FRML MDRD: 47 ML/MIN/1.73M2

## 2022-01-28 ENCOUNTER — TELEPHONE (OUTPATIENT)
Dept: FAMILY MEDICINE CLINIC | Age: 87
End: 2022-01-28

## 2022-01-28 RX ORDER — ACETAMINOPHEN 325 MG/1
650 TABLET ORAL
Status: CANCELLED | OUTPATIENT
Start: 2022-01-28

## 2022-01-28 RX ORDER — PANTOPRAZOLE SODIUM 40 MG/1
TABLET, DELAYED RELEASE ORAL
Qty: 180 TABLET | Refills: 1 | Status: SHIPPED | OUTPATIENT
Start: 2022-01-28 | End: 2022-10-12

## 2022-01-28 RX ORDER — ALBUTEROL SULFATE 90 UG/1
4 AEROSOL, METERED RESPIRATORY (INHALATION) PRN
Status: CANCELLED | OUTPATIENT
Start: 2022-01-28

## 2022-01-28 RX ORDER — ONDANSETRON 2 MG/ML
8 INJECTION INTRAMUSCULAR; INTRAVENOUS
Status: CANCELLED | OUTPATIENT
Start: 2022-01-28

## 2022-01-28 RX ORDER — EPINEPHRINE 1 MG/ML
0.3 INJECTION, SOLUTION, CONCENTRATE INTRAVENOUS PRN
Status: CANCELLED | OUTPATIENT
Start: 2022-01-28

## 2022-01-28 RX ORDER — SODIUM CHLORIDE 9 MG/ML
INJECTION, SOLUTION INTRAVENOUS CONTINUOUS
Status: CANCELLED | OUTPATIENT
Start: 2022-01-28

## 2022-01-28 RX ORDER — GABAPENTIN 300 MG/1
300 CAPSULE ORAL NIGHTLY
Qty: 90 CAPSULE | Refills: 0 | Status: SHIPPED | OUTPATIENT
Start: 2022-01-28 | End: 2022-04-29 | Stop reason: SDUPTHER

## 2022-01-28 RX ORDER — DIPHENHYDRAMINE HYDROCHLORIDE 50 MG/ML
50 INJECTION INTRAMUSCULAR; INTRAVENOUS
Status: CANCELLED | OUTPATIENT
Start: 2022-01-28

## 2022-01-28 NOTE — TELEPHONE ENCOUNTER
----- Message from Shreyas Anaya MD sent at 1/28/2022  6:36 AM EST -----  Labs ok and ok  for for prolia

## 2022-01-28 NOTE — TELEPHONE ENCOUNTER
Prolia Order, Dexa Scan, Insurance Cards and Labs faxed to [facility] [employer] (Fax #: [phone]). They will forward the records to Pharmacy to check PA, and if not required they will forward patient information to Central Scheduling and they will contact the patient with an appt day and time. Called patient, no answer and could not leave a message.

## 2022-02-07 ENCOUNTER — HOSPITAL ENCOUNTER (OUTPATIENT)
Dept: NURSING | Age: 87
End: 2022-02-07

## 2022-02-09 DIAGNOSIS — F41.9 ANXIETY: ICD-10-CM

## 2022-02-09 NOTE — TELEPHONE ENCOUNTER
Date of last visit:  1/11/2022  Date of next visit:  4/11/2022    Requested Prescriptions     Pending Prescriptions Disp Refills    LORazepam (ATIVAN) 0.5 MG tablet 120 tablet 0     Sig: take 1 tablet by mouth every 6 hours if needed for anxiety

## 2022-02-10 ENCOUNTER — HOSPITAL ENCOUNTER (OUTPATIENT)
Dept: WOMENS IMAGING | Age: 87
Discharge: HOME | End: 2022-02-10
Payer: MEDICARE

## 2022-02-10 DIAGNOSIS — Z12.31 VISIT FOR SCREENING MAMMOGRAM: ICD-10-CM

## 2022-02-10 PROCEDURE — 77063 BREAST TOMOSYNTHESIS BI: CPT

## 2022-02-11 RX ORDER — LORAZEPAM 0.5 MG/1
TABLET ORAL
Qty: 120 TABLET | Refills: 0 | Status: SHIPPED | OUTPATIENT
Start: 2022-02-11 | End: 2022-04-19 | Stop reason: SDUPTHER

## 2022-02-17 ENCOUNTER — HOSPITAL ENCOUNTER (OUTPATIENT)
Dept: NURSING | Age: 87
Discharge: HOME | End: 2022-02-17
Payer: MEDICARE

## 2022-02-17 VITALS
BODY MASS INDEX: 22.26 KG/M2 | SYSTOLIC BLOOD PRESSURE: 139 MMHG | RESPIRATION RATE: 18 BRPM | OXYGEN SATURATION: 96 % | TEMPERATURE: 97.3 F | DIASTOLIC BLOOD PRESSURE: 65 MMHG | HEART RATE: 80 BPM | WEIGHT: 114 LBS

## 2022-02-17 DIAGNOSIS — M81.0 AGE-RELATED OSTEOPOROSIS WITHOUT CURRENT PATHOLOGICAL FRACTURE: Primary | ICD-10-CM

## 2022-02-17 PROCEDURE — 6360000002 HC RX W HCPCS: Performed by: FAMILY MEDICINE

## 2022-02-17 PROCEDURE — 96372 THER/PROPH/DIAG INJ SC/IM: CPT

## 2022-02-17 RX ORDER — ONDANSETRON 2 MG/ML
8 INJECTION INTRAMUSCULAR; INTRAVENOUS
Status: CANCELLED | OUTPATIENT
Start: 2022-08-18

## 2022-02-17 RX ORDER — DIPHENHYDRAMINE HYDROCHLORIDE 50 MG/ML
50 INJECTION INTRAMUSCULAR; INTRAVENOUS
Status: CANCELLED | OUTPATIENT
Start: 2022-08-18

## 2022-02-17 RX ORDER — ALBUTEROL SULFATE 90 UG/1
4 AEROSOL, METERED RESPIRATORY (INHALATION) PRN
Status: CANCELLED | OUTPATIENT
Start: 2022-08-18

## 2022-02-17 RX ORDER — ACETAMINOPHEN 325 MG/1
650 TABLET ORAL
Status: CANCELLED | OUTPATIENT
Start: 2022-08-18

## 2022-02-17 RX ORDER — SODIUM CHLORIDE 9 MG/ML
INJECTION, SOLUTION INTRAVENOUS CONTINUOUS
Status: CANCELLED | OUTPATIENT
Start: 2022-08-18

## 2022-02-17 RX ADMIN — DENOSUMAB 60 MG: 60 INJECTION SUBCUTANEOUS at 12:59

## 2022-02-17 ASSESSMENT — PAIN - FUNCTIONAL ASSESSMENT: PAIN_FUNCTIONAL_ASSESSMENT: 0-10

## 2022-02-17 NOTE — PROGRESS NOTES
_M___ Safety:       (Environmental)   [place] to environment   Ensure ID band is correct and in place/ allergy band as needed   Assess for fall risk   Initiate fall precautions as applicable (fall band, side rails, etc.)   Call light within reach   Bed in low position/ wheels locked    __M__ Pain:        Assess pain level and characteristics   Administer analgesics as ordered   Assess effectiveness of pain management and report to MD as needed    __M__ Knowledge Deficit:   Assess baseline knowledge   Provide teaching at level of understanding   Provide teaching via preferred learning method   Evaluate teaching effectiveness    _M___ Hemodynamic/Respiratory Status:       (Pre and Post Procedure Monitoring)   Assess/Monitor vital signs and LOC   Assess Baseline SpO2 prior to any sedation   Obtain weight/height   Assess vital signs/ LOC until patient meets discharge criteria   Monitor procedure site and notify MD of any issues    __

## 2022-03-03 DIAGNOSIS — G89.4 CHRONIC PAIN SYNDROME: ICD-10-CM

## 2022-03-04 RX ORDER — TRAMADOL HYDROCHLORIDE 50 MG/1
100 TABLET ORAL EVERY 8 HOURS PRN
Qty: 180 TABLET | Refills: 0 | Status: SHIPPED | OUTPATIENT
Start: 2022-03-04 | End: 2022-04-19 | Stop reason: SDUPTHER

## 2022-03-06 DIAGNOSIS — F41.9 ANXIETY: ICD-10-CM

## 2022-03-06 DIAGNOSIS — E03.4 HYPOTHYROIDISM DUE TO ACQUIRED ATROPHY OF THYROID: ICD-10-CM

## 2022-03-06 DIAGNOSIS — F33.41 RECURRENT MAJOR DEPRESSIVE DISORDER, IN PARTIAL REMISSION (HCC): ICD-10-CM

## 2022-03-07 NOTE — TELEPHONE ENCOUNTER
Date of last visit:  1/11/2022  Date of next visit:  4/11/2022    Requested Prescriptions     Pending Prescriptions Disp Refills    PARoxetine (PAXIL) 20 MG tablet [Pharmacy Med Name: PAROXETINE HCL 20 MG TABLET] 90 tablet 1     Sig: take 1 tablet by mouth once daily    levothyroxine (SYNTHROID) 50 MCG tablet [Pharmacy Med Name: LEVOTHYROXINE 50 MCG TABLET] 90 tablet 1     Sig: take 1 tablet by mouth once daily

## 2022-03-08 RX ORDER — LEVOTHYROXINE SODIUM 0.05 MG/1
TABLET ORAL
Qty: 90 TABLET | Refills: 1 | Status: SHIPPED | OUTPATIENT
Start: 2022-03-08 | End: 2022-08-30

## 2022-03-08 RX ORDER — PAROXETINE 10 MG/1
10 TABLET, FILM COATED ORAL DAILY
Qty: 90 TABLET | Refills: 1 | Status: SHIPPED | OUTPATIENT
Start: 2022-03-08 | End: 2022-04-19 | Stop reason: SDUPTHER

## 2022-03-16 DIAGNOSIS — N31.9 NEUROGENIC BLADDER: ICD-10-CM

## 2022-03-16 NOTE — TELEPHONE ENCOUNTER
Y[facility] Tom called requesting a refill of their:    mirabegron (MYRBETRIQ) 50 MG TB24 QD    Send to [facility] on [address]

## 2022-04-18 DIAGNOSIS — G89.4 CHRONIC PAIN SYNDROME: ICD-10-CM

## 2022-04-18 NOTE — TELEPHONE ENCOUNTER
Date of last visit:  1/11/2022  Date of next visit:  4/19/2022    Requested Prescriptions     Pending Prescriptions Disp Refills    traMADol (ULTRAM) 50 MG tablet 180 tablet 0     Sig: Take 2 tablets by mouth every 8 hours as needed for Pain for up to 30 days.

## 2022-04-19 ENCOUNTER — OFFICE VISIT (OUTPATIENT)
Dept: FAMILY MEDICINE CLINIC | Age: 87
End: 2022-04-19

## 2022-04-19 VITALS
DIASTOLIC BLOOD PRESSURE: 64 MMHG | RESPIRATION RATE: 16 BRPM | SYSTOLIC BLOOD PRESSURE: 110 MMHG | HEART RATE: 76 BPM | BODY MASS INDEX: 21.79 KG/M2 | WEIGHT: 111 LBS | HEIGHT: 60 IN

## 2022-04-19 DIAGNOSIS — I10 ESSENTIAL HYPERTENSION: Primary | ICD-10-CM

## 2022-04-19 DIAGNOSIS — G89.4 CHRONIC PAIN SYNDROME: ICD-10-CM

## 2022-04-19 DIAGNOSIS — F33.41 RECURRENT MAJOR DEPRESSIVE DISORDER, IN PARTIAL REMISSION (HCC): ICD-10-CM

## 2022-04-19 DIAGNOSIS — M81.0 AGE-RELATED OSTEOPOROSIS WITHOUT CURRENT PATHOLOGICAL FRACTURE: ICD-10-CM

## 2022-04-19 DIAGNOSIS — E78.00 PURE HYPERCHOLESTEROLEMIA: ICD-10-CM

## 2022-04-19 DIAGNOSIS — H35.3220 EXUDATIVE AGE-RELATED MACULAR DEGENERATION OF LEFT EYE, UNSPECIFIED STAGE (HCC): ICD-10-CM

## 2022-04-19 DIAGNOSIS — G89.29 CHRONIC MIDLINE LOW BACK PAIN WITHOUT SCIATICA: ICD-10-CM

## 2022-04-19 DIAGNOSIS — F41.9 ANXIETY: ICD-10-CM

## 2022-04-19 DIAGNOSIS — M46.1 SI (SACROILIAC) JOINT INFLAMMATION (HCC): ICD-10-CM

## 2022-04-19 DIAGNOSIS — E55.9 VITAMIN D DEFICIENCY: ICD-10-CM

## 2022-04-19 DIAGNOSIS — M54.50 CHRONIC MIDLINE LOW BACK PAIN WITHOUT SCIATICA: ICD-10-CM

## 2022-04-19 DIAGNOSIS — E03.4 HYPOTHYROIDISM DUE TO ACQUIRED ATROPHY OF THYROID: ICD-10-CM

## 2022-04-19 DIAGNOSIS — N18.31 STAGE 3A CHRONIC KIDNEY DISEASE (HCC): ICD-10-CM

## 2022-04-19 DIAGNOSIS — Z00.00 MEDICARE ANNUAL WELLNESS VISIT, SUBSEQUENT: ICD-10-CM

## 2022-04-19 PROBLEM — N18.30 CHRONIC RENAL DISEASE, STAGE III (HCC): Status: ACTIVE | Noted: 2022-04-19

## 2022-04-19 PROCEDURE — 1036F TOBACCO NON-USER: CPT | Performed by: FAMILY MEDICINE

## 2022-04-19 PROCEDURE — 99213 OFFICE O/P EST LOW 20 MIN: CPT | Performed by: FAMILY MEDICINE

## 2022-04-19 PROCEDURE — 1123F ACP DISCUSS/DSCN MKR DOCD: CPT | Performed by: FAMILY MEDICINE

## 2022-04-19 PROCEDURE — G8427 DOCREV CUR MEDS BY ELIG CLIN: HCPCS | Performed by: FAMILY MEDICINE

## 2022-04-19 PROCEDURE — G8420 CALC BMI NORM PARAMETERS: HCPCS | Performed by: FAMILY MEDICINE

## 2022-04-19 PROCEDURE — 1090F PRES/ABSN URINE INCON ASSESS: CPT | Performed by: FAMILY MEDICINE

## 2022-04-19 PROCEDURE — G0439 PPPS, SUBSEQ VISIT: HCPCS | Performed by: FAMILY MEDICINE

## 2022-04-19 RX ORDER — LORAZEPAM 0.5 MG/1
TABLET ORAL
Qty: 120 TABLET | Refills: 0 | Status: SHIPPED | OUTPATIENT
Start: 2022-04-19 | End: 2022-07-19 | Stop reason: SDUPTHER

## 2022-04-19 RX ORDER — PAROXETINE 10 MG/1
10 TABLET, FILM COATED ORAL DAILY
Qty: 90 TABLET | Refills: 1 | Status: SHIPPED | OUTPATIENT
Start: 2022-04-19

## 2022-04-19 RX ORDER — TRAMADOL HYDROCHLORIDE 50 MG/1
100 TABLET ORAL EVERY 8 HOURS PRN
Qty: 180 TABLET | Refills: 0 | Status: SHIPPED | OUTPATIENT
Start: 2022-04-19 | End: 2022-06-05 | Stop reason: SDUPTHER

## 2022-04-19 ASSESSMENT — ENCOUNTER SYMPTOMS
ABDOMINAL PAIN: 0
SORE THROAT: 0
EYE PAIN: 0
COUGH: 0
NAUSEA: 0
SHORTNESS OF BREATH: 0
WHEEZING: 0
CONSTIPATION: 0
CHEST TIGHTNESS: 0

## 2022-04-19 ASSESSMENT — PATIENT HEALTH QUESTIONNAIRE - PHQ9
2. FEELING DOWN, DEPRESSED OR HOPELESS: 0
1. LITTLE INTEREST OR PLEASURE IN DOING THINGS: 0
SUM OF ALL RESPONSES TO PHQ QUESTIONS 1-9: 0
SUM OF ALL RESPONSES TO PHQ QUESTIONS 1-9: 0
SUM OF ALL RESPONSES TO PHQ9 QUESTIONS 1 & 2: 0
SUM OF ALL RESPONSES TO PHQ QUESTIONS 1-9: 0
SUM OF ALL RESPONSES TO PHQ QUESTIONS 1-9: 0

## 2022-04-19 ASSESSMENT — LIFESTYLE VARIABLES: HOW OFTEN DO YOU HAVE A DRINK CONTAINING ALCOHOL: NEVER

## 2022-04-19 NOTE — PATIENT INSTRUCTIONS
Personalized Preventive Plan for Violeta Eason - 4/19/2022  Medicare offers a range of preventive health benefits. Some of the tests and screenings are paid in full while other may be subject to a deductible, co-insurance, and/or copay. Some of these benefits include a comprehensive review of your medical history including lifestyle, illnesses that may run in your family, and various assessments and screenings as appropriate. After reviewing your medical record and screening and assessments performed today your provider may have ordered immunizations, labs, imaging, and/or referrals for you. A list of these orders (if applicable) as well as your Preventive Care list are included within your After Visit Summary for your review. Other Preventive Recommendations:    · A preventive eye exam performed by an eye specialist is recommended every 1-2 years to screen for glaucoma; cataracts, macular degeneration, and other eye disorders. · A preventive dental visit is recommended every 6 months. · Try to get at least 150 minutes of exercise per week or 10,000 steps per day on a pedometer . · Order or download the FREE \"Exercise & Physical Activity: Your Everyday Guide\" from [employer]. Call 2-848.761.4165 or search [employer] online. · You need 8015-7364 mg of calcium and 5887-3157 IU of vitamin D per day. It is possible to meet your calcium requirement with diet alone, but a vitamin D supplement is usually necessary to meet this goal.  · When exposed to the sun, use a sunscreen that protects against both UVA and UVB radiation with an SPF of 30 or greater. Reapply every 2 to 3 hours or after sweating, drying off with a towel, or swimming. · Always wear a seat belt when traveling in a car. Always wear a helmet when riding a bicycle or motorcycle.

## 2022-04-19 NOTE — PROGRESS NOTES
Medicare Annual Wellness Visit    Maxime Costa is here for Medicare AWV    Assessment & Plan   Anxiety  The following orders have not been finalized:  -     PARoxetine (PAXIL) 10 MG tablet  -     LORazepam (ATIVAN) 0.5 MG tablet  Recurrent major depressive disorder, in partial remission ([facility] [address]  The following orders have not been finalized:  -     PARoxetine (PAXIL) 10 MG tablet      Recommendations for Preventive Services Due: see orders and patient instructions/AVS.  Recommended screening schedule for the next 5-10 years is provided to the patient in written form: see Patient Instructions/AVS.     No follow-ups on file. Subjective   Patient's complete Health Risk Assessment and screening values have been reviewed and are found in Flowsheets. The following problems were reviewed today and where indicated follow up appointments were made and/or referrals ordered.     Positive Risk Factor Screenings with Interventions:    Fall Risk:  Do you feel unsteady or are you worried about falling? : (!) yes  2 or more falls in past year?: no  Fall with injury in past year?: no     Fall Risk Interventions:    · Home safety tips provided           General Health and ACP:  General  In general, how would you say your health is?: Good  In the past 7 days, have you experienced any of the following: New or Increased Pain, New or Increased Fatigue, Loneliness, Social Isolation, Stress or Anger?: (!) Yes  Select all that apply: (!) Stress  Do you get the social and emotional support that you need?: Yes  Do you have a Living Will?: Yes    Advance Directives     Power of  Living Will ACP-Advance Directive ACP-Power of     Not on File Filed on 04/08/13 Filed Not on File      General Health Risk Interventions:  ·   stable     Health Habits/Nutrition:     Physical Activity: Inactive    Days of Exercise per Week: 0 days    Minutes of Exercise per Session: 0 min     Have you lost any weight without trying in the past 3 months?: (!) Yes  Body mass index: 21.68  Have you seen the dentist within the past year?: Yes    Weight  Stable   Health Habits/Nutrition Interventions:  ·   stable  with  dentist and  see  vision     Hearing/Vision:  Do you or your family notice any trouble with your hearing that hasn't been managed with hearing aids?: No  Do you have difficulty driving, watching TV, or doing any of your daily activities because of your eyesight?: (!) Yes  Have you had an eye exam within the past year?: (!) No  No exam data present    Hearing/Vision Interventions:  · Vision concerns:  patient declines any further evaluation/treatment for this issue,   needs  appt      ADLs:  In the past 7 days, did you need help from others to perform any of the following everyday activities: Eating, dressing, grooming, bathing, toileting, or walking/balance?: (!) Yes  Select all that apply: (!) Walking/Balance  In the past 7 days, did you need help from others to take care of any of the following: Laundry, housekeeping, banking/finances, shopping, telephone use, food preparation, transportation, or taking medications?: (!) Yes  Select all that apply: (!) Shopping,Housekeeping,Transportation    ADL Interventions:  · use  of the  walker           Objective   Vitals:    04/19/22 1526   BP: 110/64   Site: Right Upper Arm   Position: Sitting   Cuff Size: Medium Adult   Pulse: 76   Resp: 16   Weight: 111 lb (50.3 kg)   Height: 5' (1.524 m)      Body mass index is 21.68 kg/m². Allergies   Allergen Reactions    Amoxicillin-Pot Clavulanate Diarrhea    Macrobid [Nitrofurantoin]     Statins Support Therapy      Unaware of allery    Sulfa Antibiotics Nausea Only     Prior to Visit Medications    Medication Sig Taking? Authorizing Provider   traMADol (ULTRAM) 50 MG tablet Take 2 tablets by mouth every 8 hours as needed for Pain for up to 30 days.  Yes Adonis Medina MD   mirabegron (MYRBETRIQ) 50 MG TB24 take 1 tablet by mouth once daily Yes Sintia Kemp MD   PARoxetine (PAXIL) 10 MG tablet Take 1 tablet by mouth daily Yes Sintia Kemp MD   levothyroxine (SYNTHROID) 50 MCG tablet take 1 tablet by mouth once daily Yes Sintia Kemp MD   gabapentin (NEURONTIN) 300 MG capsule Take 1 capsule by mouth nightly for 90 days. Intended supply: 90 days Yes Sintia Kemp MD   pantoprazole (PROTONIX) 40 MG tablet take 1 tablet by mouth twice daily 30 MINUTES PRIOR TO AM MEAL AND SUPPER Yes Sintia Kemp MD   ipratropium (ATROVENT) 0.03 % nasal spray 2 sprays by Each Nostril route every 12 hours Yes Sintia Kemp MD   Multiple Vitamins-Minerals (PRESERVISION/LUTEIN) CAPS Take 1 capsule by mouth 2 times daily. Yes Historical Provider, MD Fuchs (Including outside providers/suppliers regularly involved in providing care):   Patient Care Team:  Sintia Kemp MD as PCP - General (Family Medicine)  Sintia Kemp MD as PCP - [facility] Empaneled Provider    Reviewed and updated this visit:  Tobacco  Allergies  Meds  Med Hx  Surg Hx  Soc Hx  Fam Hx                  Thurlow Yvonne Eason (:  1931) is a 80 y.o. female,Established patient, here for evaluation of the following chief complaint(s):  Medicare AWV         ASSESSMENT     Diagnosis Orders   1. Essential hypertension  Comprehensive Metabolic Panel   2. Anxiety  PARoxetine (PAXIL) 10 MG tablet    LORazepam (ATIVAN) 0.5 MG tablet   3. Recurrent major depressive disorder, in partial remission (HCC)  PARoxetine (PAXIL) 10 MG tablet   4. Age-related osteoporosis without current pathological fracture     5. SI (sacroiliac) joint inflammation (HCC)     6. Stage 3a chronic kidney disease ([address]     7. Exudative age-related macular degeneration of left eye, unspecified stage ([address]     8. Hypothyroidism due to acquired atrophy of thyroid  T4, Free    TSH   9. Chronic pain syndrome     10. Vitamin D deficiency     11.  Chronic midline low back pain without sciatica Review of Systems   Constitutional: Negative for appetite change, fatigue and fever. HENT: Negative for congestion, ear pain, postnasal drip and sore throat. Eyes: Negative for pain and visual disturbance. Respiratory: Negative for cough, chest tightness, shortness of breath and wheezing. Cardiovascular: Negative for chest pain, palpitations and leg swelling. Gastrointestinal: Negative for abdominal pain, constipation and nausea. Genitourinary: Negative for dysuria and frequency. Musculoskeletal: Negative for arthralgias, joint swelling, neck pain and neck stiffness. Skin: Negative for rash. Neurological: Negative for dizziness, weakness, numbness and headaches. Hematological: Negative for adenopathy. Does not bruise/bleed easily. Psychiatric/Behavioral: Negative for behavioral problems and sleep disturbance. The patient is not nervous/anxious. /64 (Site: Right Upper Arm, Position: Sitting, Cuff Size: Medium Adult)   Pulse 76   Resp 16   Ht 5' (1.524 m)   Wt 111 lb (50.3 kg)   BMI 21.68 kg/m²   Objective   Physical Exam  Vitals and nursing note reviewed. Constitutional:       Appearance: She is well-developed. HENT:      Head: Normocephalic and atraumatic. Right Ear: External ear normal.      Left Ear: External ear normal.      Nose: Nose normal.   Eyes:      General: No scleral icterus. Conjunctiva/sclera: Conjunctivae normal.      Pupils: Pupils are equal, round, and reactive to light. Neck:      Thyroid: No thyromegaly. Vascular: No JVD. Cardiovascular:      Rate and Rhythm: Normal rate and regular rhythm. Heart sounds: Normal heart sounds. Pulmonary:      Effort: Pulmonary effort is normal.      Breath sounds: Normal breath sounds. No wheezing or rales. Abdominal:      General: Bowel sounds are normal. There is no distension. Palpations: Abdomen is soft. There is no mass. Tenderness: There is no abdominal tenderness. Musculoskeletal:         General: No tenderness. Normal range of motion. Cervical back: Normal range of motion and neck supple. Lymphadenopathy:      Cervical: No cervical adenopathy. Skin:     General: Skin is warm and dry. Findings: No rash. Neurological:      Mental Status: She is alert and oriented to person, place, and time. Cranial Nerves: No cranial nerve deficit. Deep Tendon Reflexes: Reflexes are normal and symmetric. An electronic signature was used to authenticate this note.     --Godfrey Brownlee MD

## 2022-04-28 DIAGNOSIS — G60.9 IDIOPATHIC PERIPHERAL NEUROPATHY: ICD-10-CM

## 2022-04-28 NOTE — TELEPHONE ENCOUNTER
Date of last visit:  4/19/2022  Date of next visit:  7/19/2022    Requested Prescriptions     Pending Prescriptions Disp Refills    gabapentin (NEURONTIN) 300 MG capsule 90 capsule 0     Sig: Take 1 capsule by mouth nightly for 90 days.  Intended supply: 90 days

## 2022-04-29 RX ORDER — GABAPENTIN 300 MG/1
300 CAPSULE ORAL NIGHTLY
Qty: 90 CAPSULE | Refills: 1 | Status: SHIPPED | OUTPATIENT
Start: 2022-04-29 | End: 2022-10-20

## 2022-06-03 DIAGNOSIS — G89.4 CHRONIC PAIN SYNDROME: ICD-10-CM

## 2022-06-03 DIAGNOSIS — G60.9 IDIOPATHIC PERIPHERAL NEUROPATHY: ICD-10-CM

## 2022-06-03 RX ORDER — GABAPENTIN 300 MG/1
300 CAPSULE ORAL NIGHTLY
Qty: 90 CAPSULE | Refills: 1 | Status: CANCELLED | OUTPATIENT
Start: 2022-06-03 | End: 2022-12-02

## 2022-06-03 NOTE — TELEPHONE ENCOUNTER
Date of last visit:  4/19/2022  Date of next visit:  7/19/2022    Requested Prescriptions     Pending Prescriptions Disp Refills    gabapentin (NEURONTIN) 300 MG capsule 90 capsule 1     Sig: Take 1 capsule by mouth nightly for 182 days. Intended supply: 90 days    traMADol (ULTRAM) 50 MG tablet 180 tablet 0     Sig: Take 2 tablets by mouth every 8 hours as needed for Pain for up to 30 days.

## 2022-06-03 NOTE — TELEPHONE ENCOUNTER
Jeffrey Phoenix called requesting a refill of their:     gabapentin (NEURONTIN) 300 MG capsule Take 1 capsule by mouth nightly   traMADol (ULTRAM) 50 MG tablet Take 2 tablets by mouth every 8 hours as needed for Pain     Send to [facility] on [facility]

## 2022-06-05 RX ORDER — TRAMADOL HYDROCHLORIDE 50 MG/1
100 TABLET ORAL EVERY 8 HOURS PRN
Qty: 180 TABLET | Refills: 0 | Status: SHIPPED | OUTPATIENT
Start: 2022-06-05 | End: 2022-07-19 | Stop reason: SDUPTHER

## 2022-06-05 NOTE — TELEPHONE ENCOUNTER
Inform sent in tramadol but just sent neurontin for 90 days on 4-29 as sent 90 and only takes one   At  Night so should not need till july          please call

## 2022-06-22 ENCOUNTER — NURSE ONLY (OUTPATIENT)
Dept: LAB | Age: 87
End: 2022-06-22

## 2022-06-24 ENCOUNTER — NURSE ONLY (OUTPATIENT)
Dept: LAB | Age: 87
End: 2022-06-24

## 2022-06-24 DIAGNOSIS — E78.00 PURE HYPERCHOLESTEROLEMIA: ICD-10-CM

## 2022-06-24 DIAGNOSIS — I10 ESSENTIAL HYPERTENSION: ICD-10-CM

## 2022-06-24 DIAGNOSIS — E03.4 HYPOTHYROIDISM DUE TO ACQUIRED ATROPHY OF THYROID: ICD-10-CM

## 2022-06-24 LAB
ALBUMIN SERPL-MCNC: 4.3 G/DL (ref 3.5–5.1)
ALP BLD-CCNC: 79 U/L (ref 38–126)
ALT SERPL-CCNC: 13 U/L (ref 11–66)
ANION GAP SERPL CALCULATED.3IONS-SCNC: 14 MEQ/L (ref 8–16)
AST SERPL-CCNC: 26 U/L (ref 5–40)
BILIRUB SERPL-MCNC: 0.4 MG/DL (ref 0.3–1.2)
BUN BLDV-MCNC: 18 MG/DL (ref 7–22)
CALCIUM SERPL-MCNC: 9.8 MG/DL (ref 8.5–10.5)
CHLORIDE BLD-SCNC: 103 MEQ/L (ref 98–111)
CHOLESTEROL, TOTAL: 268 MG/DL (ref 100–199)
CO2: 27 MEQ/L (ref 23–33)
CREAT SERPL-MCNC: 1.1 MG/DL (ref 0.4–1.2)
GFR SERPL CREATININE-BSD FRML MDRD: 47 ML/MIN/1.73M2
GLUCOSE BLD-MCNC: 81 MG/DL (ref 70–108)
HDLC SERPL-MCNC: 98 MG/DL
LDL CHOLESTEROL CALCULATED: 150 MG/DL
POTASSIUM SERPL-SCNC: 4.5 MEQ/L (ref 3.5–5.2)
SODIUM BLD-SCNC: 144 MEQ/L (ref 135–145)
T4 FREE: 1.21 NG/DL (ref 0.93–1.76)
TOTAL PROTEIN: 6.7 G/DL (ref 6.1–8)
TRIGL SERPL-MCNC: 101 MG/DL (ref 0–199)
TSH SERPL DL<=0.05 MIU/L-ACNC: 2.09 UIU/ML (ref 0.4–4.2)

## 2022-06-27 ENCOUNTER — TELEPHONE (OUTPATIENT)
Dept: FAMILY MEDICINE CLINIC | Age: 87
End: 2022-06-27

## 2022-06-27 NOTE — TELEPHONE ENCOUNTER
----- Message from Jamal Valencia MD sent at 6/27/2022  5:52 AM EDT -----  Call as thyroid level stable and other labs good but chol up and no change and does not tolerate meds .   Please call

## 2022-07-18 DIAGNOSIS — F41.9 ANXIETY: ICD-10-CM

## 2022-07-18 NOTE — TELEPHONE ENCOUNTER
Date of last visit:  4/19/2022  Date of next visit:  7/19/2022    Requested Prescriptions     Pending Prescriptions Disp Refills    LORazepam (ATIVAN) 0.5 MG tablet 120 tablet 0     Sig: take 1 tablet by mouth every 6 hours if needed for anxiety

## 2022-07-18 NOTE — TELEPHONE ENCOUNTER
Fatuma Em called requesting a refill of their:      LORazepam (ATIVAN) 0.5 MG tablet take 1 tablet by mouth every 6 hours if needed for anxiety    Send to [facility] on INSKIP

## 2022-07-19 ENCOUNTER — OFFICE VISIT (OUTPATIENT)
Dept: FAMILY MEDICINE CLINIC | Age: 87
End: 2022-07-19

## 2022-07-19 VITALS
SYSTOLIC BLOOD PRESSURE: 132 MMHG | HEIGHT: 60 IN | RESPIRATION RATE: 12 BRPM | BODY MASS INDEX: 22.42 KG/M2 | WEIGHT: 114.2 LBS | HEART RATE: 76 BPM | DIASTOLIC BLOOD PRESSURE: 74 MMHG

## 2022-07-19 DIAGNOSIS — H35.3220 EXUDATIVE AGE-RELATED MACULAR DEGENERATION OF LEFT EYE, UNSPECIFIED STAGE (HCC): ICD-10-CM

## 2022-07-19 DIAGNOSIS — N18.31 STAGE 3A CHRONIC KIDNEY DISEASE (HCC): ICD-10-CM

## 2022-07-19 DIAGNOSIS — G89.4 CHRONIC PAIN SYNDROME: ICD-10-CM

## 2022-07-19 DIAGNOSIS — G89.29 CHRONIC MIDLINE LOW BACK PAIN WITHOUT SCIATICA: ICD-10-CM

## 2022-07-19 DIAGNOSIS — F33.41 RECURRENT MAJOR DEPRESSIVE DISORDER, IN PARTIAL REMISSION (HCC): ICD-10-CM

## 2022-07-19 DIAGNOSIS — M46.1 SI (SACROILIAC) JOINT INFLAMMATION (HCC): Primary | ICD-10-CM

## 2022-07-19 DIAGNOSIS — M54.50 CHRONIC MIDLINE LOW BACK PAIN WITHOUT SCIATICA: ICD-10-CM

## 2022-07-19 PROCEDURE — 1123F ACP DISCUSS/DSCN MKR DOCD: CPT | Performed by: FAMILY MEDICINE

## 2022-07-19 PROCEDURE — 99213 OFFICE O/P EST LOW 20 MIN: CPT | Performed by: FAMILY MEDICINE

## 2022-07-19 RX ORDER — TRAMADOL HYDROCHLORIDE 50 MG/1
100 TABLET ORAL EVERY 8 HOURS PRN
Qty: 180 TABLET | Refills: 0 | Status: SHIPPED | OUTPATIENT
Start: 2022-07-19 | End: 2022-09-02 | Stop reason: SDUPTHER

## 2022-07-19 RX ORDER — LORAZEPAM 0.5 MG/1
TABLET ORAL
Qty: 120 TABLET | Refills: 0 | Status: SHIPPED | OUTPATIENT
Start: 2022-07-19 | End: 2022-09-30 | Stop reason: SDUPTHER

## 2022-07-19 SDOH — ECONOMIC STABILITY: FOOD INSECURITY: WITHIN THE PAST 12 MONTHS, THE FOOD YOU BOUGHT JUST DIDN'T LAST AND YOU DIDN'T HAVE MONEY TO GET MORE.: NEVER TRUE

## 2022-07-19 SDOH — ECONOMIC STABILITY: FOOD INSECURITY: WITHIN THE PAST 12 MONTHS, YOU WORRIED THAT YOUR FOOD WOULD RUN OUT BEFORE YOU GOT MONEY TO BUY MORE.: NEVER TRUE

## 2022-07-19 ASSESSMENT — ENCOUNTER SYMPTOMS
CHEST TIGHTNESS: 0
CONSTIPATION: 0
SORE THROAT: 0
SHORTNESS OF BREATH: 0
COUGH: 0
WHEEZING: 0
EYE PAIN: 0
ABDOMINAL PAIN: 0
NAUSEA: 0

## 2022-07-19 ASSESSMENT — SOCIAL DETERMINANTS OF HEALTH (SDOH): HOW HARD IS IT FOR YOU TO PAY FOR THE VERY BASICS LIKE FOOD, HOUSING, MEDICAL CARE, AND HEATING?: NOT HARD AT ALL

## 2022-07-19 NOTE — PROGRESS NOTES
Subjective:      Patient ID: Nasir Louie is a 80 y.o. female. Persist  vertigo  yesterday   and  now  better    as  2  hours       Hypothyroid  stable        Back pain  noted     chronic       Crf  noted    Hyperlipidemia  This is a chronic problem. The current episode started more than 1 year ago. The problem is controlled. Exacerbating diseases include chronic renal disease. Pertinent negatives include no chest pain, focal weakness, leg pain or shortness of breath. Current antihyperlipidemic treatment includes statins. The current treatment provides significant improvement of lipids. There are no compliance problems. Past Medical History:   Diagnosis Date    Acute blood loss as cause of postoperative anemia 12/2/2017    Alcohol abuse sober since Calin [place] polyp     Dr. Flor Mejia    Depression     Gastric ulcer 11/2017     wisser     GERD (gastroesophageal reflux disease)     PUD 1998 Dr. Anel Moura    Hyperlipidemia     Hypertension 12/3/2017    Hypertension 12/3/2017    Hypothyroid     Incisional hernia of anterior abdominal wall without obstruction or gangrene     Neuropathy, peripheral     Osteoarthritis     Osteoporosis     Pneumoperitoneum 11/29/2017    Thyroid disease     hypothyroid    Vitamin D deficiency       Review of Systems   Constitutional:  Negative for appetite change, fatigue and fever. HENT:  Negative for congestion, ear pain, postnasal drip and sore throat. Eyes:  Negative for pain and visual disturbance. Respiratory:  Negative for cough, chest tightness, shortness of breath and wheezing. Cardiovascular:  Negative for chest pain, palpitations and leg swelling. Gastrointestinal:  Negative for abdominal pain, constipation and nausea. Genitourinary:  Negative for dysuria and frequency. Musculoskeletal:  Negative for arthralgias, joint swelling, neck pain and neck stiffness. Skin:  Negative for rash.    Neurological:  Negative for dizziness, focal weakness, weakness, numbness and headaches. Hematological:  Negative for adenopathy. Does not bruise/bleed easily. Psychiatric/Behavioral:  Negative for behavioral problems and sleep disturbance. The patient is not nervous/anxious. /74 (Site: Left Upper Arm, Position: Sitting, Cuff Size: Medium Adult)   Pulse 76   Resp 12   Ht 5' (1.524 m)   Wt 114 lb 3.2 oz (51.8 kg)   BMI 22.30 kg/m²    Objective:   Physical Exam  Vitals and nursing note reviewed. Constitutional:       Appearance: She is well-developed. HENT:      Head: Normocephalic and atraumatic. Right Ear: External ear normal.      Left Ear: External ear normal.      Nose: Nose normal.   Eyes:      General: No scleral icterus. Conjunctiva/sclera: Conjunctivae normal.      Pupils: Pupils are equal, round, and reactive to light. Neck:      Thyroid: No thyromegaly. Vascular: No JVD. Cardiovascular:      Rate and Rhythm: Normal rate and regular rhythm. Heart sounds: Normal heart sounds. Pulmonary:      Effort: Pulmonary effort is normal.      Breath sounds: Normal breath sounds. No wheezing or rales. Abdominal:      General: Bowel sounds are normal. There is no distension. Palpations: Abdomen is soft. There is no mass. Tenderness: There is no abdominal tenderness. Musculoskeletal:         General: No tenderness. Normal range of motion. Cervical back: Normal range of motion and neck supple. Lymphadenopathy:      Cervical: No cervical adenopathy. Skin:     General: Skin is warm and dry. Findings: No rash. Neurological:      Mental Status: She is alert and oriented to person, place, and time. Cranial Nerves: No cranial nerve deficit. Deep Tendon Reflexes: Reflexes are normal and symmetric. Assessment:        ICD-10-CM    1. SI (sacroiliac) joint inflammation (HCC)  M46.1 traMADol (ULTRAM) 50 MG tablet      2. Chronic pain syndrome  G89.4 traMADol (ULTRAM) 50 MG tablet      3. Chronic midline low back pain without sciatica  M54.50 traMADol (ULTRAM) 50 MG tablet    G89.29       4. Exudative age-related macular degeneration of left eye, unspecified stage ([facility] [address]  H35.3220       5. Recurrent major depressive disorder, in partial remission ([facility]  F33.41       6. Stage 3a chronic kidney disease (HCC)  N18.31              Plan:      Current Outpatient Medications   Medication Sig Dispense Refill    LORazepam (ATIVAN) 0.5 MG tablet take 1 tablet by mouth every 6 hours if needed for anxiety 120 tablet 0    traMADol (ULTRAM) 50 MG tablet Take 2 tablets by mouth every 8 hours as needed for Pain for up to 30 days. 180 tablet 0    gabapentin (NEURONTIN) 300 MG capsule Take 1 capsule by mouth nightly for 182 days. Intended supply: 90 days 90 capsule 1    PARoxetine (PAXIL) 10 MG tablet Take 1 tablet by mouth daily 90 tablet 1    mirabegron (MYRBETRIQ) 50 MG TB24 take 1 tablet by mouth once daily 90 tablet 1    levothyroxine (SYNTHROID) 50 MCG tablet take 1 tablet by mouth once daily 90 tablet 1    pantoprazole (PROTONIX) 40 MG tablet take 1 tablet by mouth twice daily 30 MINUTES PRIOR TO AM MEAL AND SUPPER 180 tablet 1    ipratropium (ATROVENT) 0.03 % nasal spray 2 sprays by Each Nostril route every 12 hours 30 mL 5    Multiple Vitamins-Minerals (PRESERVISION/LUTEIN) CAPS Take 1 capsule by mouth 2 times daily. No current facility-administered medications for this visit. No orders of the defined types were placed in this encounter.         See in  3 mths       Chronic  pain  John Waterman MD

## 2022-07-21 ENCOUNTER — TELEPHONE (OUTPATIENT)
Dept: FAMILY MEDICINE CLINIC | Age: 87
End: 2022-07-21

## 2022-07-21 NOTE — TELEPHONE ENCOUNTER
Patient called regarding the AVS and reviewing her DX's she saw Chronic Kidney Disease and she was not aware she had this.     Please call her back

## 2022-07-25 NOTE — TELEPHONE ENCOUNTER
For age kidney function is good but has due to age and we do not call aging kidney,  .  Decline of kidney function is normal due to age    Please inform

## 2022-08-18 ENCOUNTER — HOSPITAL ENCOUNTER (OUTPATIENT)
Dept: NURSING | Age: 87
Discharge: HOME | End: 2022-08-18
Payer: MEDICARE

## 2022-08-18 VITALS
OXYGEN SATURATION: 98 % | HEART RATE: 90 BPM | SYSTOLIC BLOOD PRESSURE: 118 MMHG | RESPIRATION RATE: 16 BRPM | DIASTOLIC BLOOD PRESSURE: 61 MMHG | TEMPERATURE: 98 F

## 2022-08-18 DIAGNOSIS — M81.0 AGE-RELATED OSTEOPOROSIS WITHOUT CURRENT PATHOLOGICAL FRACTURE: Primary | ICD-10-CM

## 2022-08-18 PROCEDURE — 96372 THER/PROPH/DIAG INJ SC/IM: CPT

## 2022-08-18 PROCEDURE — 6360000002 HC RX W HCPCS: Performed by: FAMILY MEDICINE

## 2022-08-18 RX ORDER — ALBUTEROL SULFATE 90 UG/1
4 AEROSOL, METERED RESPIRATORY (INHALATION) PRN
Status: CANCELLED | OUTPATIENT
Start: 2023-02-16

## 2022-08-18 RX ORDER — ONDANSETRON 2 MG/ML
8 INJECTION INTRAMUSCULAR; INTRAVENOUS
Status: CANCELLED | OUTPATIENT
Start: 2023-02-16

## 2022-08-18 RX ORDER — DIPHENHYDRAMINE HYDROCHLORIDE 50 MG/ML
50 INJECTION INTRAMUSCULAR; INTRAVENOUS
Status: CANCELLED | OUTPATIENT
Start: 2023-02-16

## 2022-08-18 RX ORDER — ACETAMINOPHEN 325 MG/1
650 TABLET ORAL
Status: CANCELLED | OUTPATIENT
Start: 2023-02-16

## 2022-08-18 RX ORDER — SODIUM CHLORIDE 9 MG/ML
INJECTION, SOLUTION INTRAVENOUS CONTINUOUS
Status: CANCELLED | OUTPATIENT
Start: 2023-02-16

## 2022-08-18 RX ADMIN — DENOSUMAB 60 MG: 60 INJECTION SUBCUTANEOUS at 13:02

## 2022-08-18 ASSESSMENT — PAIN - FUNCTIONAL ASSESSMENT: PAIN_FUNCTIONAL_ASSESSMENT: 0-10

## 2022-08-18 NOTE — DISCHARGE INSTRUCTIONS
ACTIVITY:  Continue usual care with your doctor. Call your doctor immediately if any severe problems or go to the nearest emergency room. I have been treated and hereby acknowledge receiving this instruction sheet. Will need to follow up with dr carbajal to order next dose .  Dose will need to be after February 18, 2023

## 2022-08-18 NOTE — PROGRESS NOTES
1300 pt admitted to opn per [facility]. for a prolia injection. PT RIGHTS AND RESPONSIBILITIES OFFERED TO PT.   3723 SHOT GIVEN. GARRY WELL STABLE.   1310 DISCHARGE INSTRUCTIONS GIVEN TO PATIENT. VERBALIZE UNDERSTANDING. [address]  WITH FAMILY AT BEDSIDE.                 __m__ Safety:       (Environmental)  [place] to environment  Ensure ID band is correct and in place/ allergy band as needed  Assess for fall risk  Initiate fall precautions as applicable (fall band, side rails, etc.)  Call light within reach  Bed in low position/ wheels locked    ___m_ Pain:       Assess pain level and characteristics  Administer analgesics as ordered  Assess effectiveness of pain management and report to MD as needed    ___m_ Knowledge Deficit:  Assess baseline knowledge  Provide teaching at level of understanding  Provide teaching via preferred learning method  Evaluate teaching effectiveness    __m__ Hemodynamic/Respiratory Status:       (Pre and Post Procedure Monitoring)  Assess/Monitor vital signs and LOC  Assess Baseline SpO2 prior to any sedation  Obtain weight/height  Assess vital signs/ LOC until patient meets discharge criteria  Monitor procedure site and notify MD of any issues

## 2022-08-29 DIAGNOSIS — E03.4 HYPOTHYROIDISM DUE TO ACQUIRED ATROPHY OF THYROID: ICD-10-CM

## 2022-08-29 NOTE — TELEPHONE ENCOUNTER
The pharmacy is requesting a refill of the below medication which has been pended for you:     Requested Prescriptions     Pending Prescriptions Disp Refills    levothyroxine (SYNTHROID) 50 MCG tablet [Pharmacy Med Name: LEVOTHYROXINE 50 MCG TABLET] 90 tablet 1     Sig: take 1 tablet by mouth once daily       Last Appointment Date: 7/19/2022  Next Appointment Date: 10/19/2022    Allergies   Allergen Reactions    Amoxicillin-Pot Clavulanate Diarrhea    Macrobid [Nitrofurantoin]     Statins Support Therapy      Unaware of allery    Sulfa Antibiotics Nausea Only

## 2022-08-30 RX ORDER — LEVOTHYROXINE SODIUM 0.05 MG/1
TABLET ORAL
Qty: 90 TABLET | Refills: 1 | Status: SHIPPED | OUTPATIENT
Start: 2022-08-30

## 2022-09-02 DIAGNOSIS — G89.4 CHRONIC PAIN SYNDROME: ICD-10-CM

## 2022-09-02 DIAGNOSIS — G89.29 CHRONIC MIDLINE LOW BACK PAIN WITHOUT SCIATICA: ICD-10-CM

## 2022-09-02 DIAGNOSIS — M54.50 CHRONIC MIDLINE LOW BACK PAIN WITHOUT SCIATICA: ICD-10-CM

## 2022-09-02 DIAGNOSIS — M46.1 SI (SACROILIAC) JOINT INFLAMMATION (HCC): ICD-10-CM

## 2022-09-02 RX ORDER — TRAMADOL HYDROCHLORIDE 50 MG/1
100 TABLET ORAL EVERY 8 HOURS PRN
Qty: 180 TABLET | Refills: 0 | Status: SHIPPED | OUTPATIENT
Start: 2022-09-02 | End: 2022-10-19 | Stop reason: SDUPTHER

## 2022-09-02 NOTE — TELEPHONE ENCOUNTER
Date of last visit:  7/19/2022  Date of next visit:  10/19/2022    Requested Prescriptions     Pending Prescriptions Disp Refills    traMADol (ULTRAM) 50 MG tablet 180 tablet 0     Sig: Take 2 tablets by mouth every 8 hours as needed for Pain for up to 30 days.

## 2022-09-09 ENCOUNTER — TRANSCRIBE ORDERS (OUTPATIENT)
Dept: ADMINISTRATIVE | Age: 87
End: 2022-09-09

## 2022-09-09 DIAGNOSIS — R13.10 PROBLEMS WITH SWALLOWING AND MASTICATION: Primary | ICD-10-CM

## 2022-09-15 ENCOUNTER — HOSPITAL ENCOUNTER (OUTPATIENT)
Dept: GENERAL RADIOLOGY | Age: 87
Discharge: HOME | End: 2022-09-15
Payer: MEDICARE

## 2022-09-15 DIAGNOSIS — R13.10 DYSPHAGIA, UNSPECIFIED TYPE: ICD-10-CM

## 2022-09-15 PROCEDURE — 92611 MOTION FLUOROSCOPY/SWALLOW: CPT

## 2022-09-15 PROCEDURE — 74230 X-RAY XM SWLNG FUNCJ C+: CPT

## 2022-09-15 PROCEDURE — 2500000003 HC RX 250 WO HCPCS: Performed by: NURSE PRACTITIONER

## 2022-09-15 RX ADMIN — BARIUM SULFATE 10 ML: 400 PASTE ORAL at 10:27

## 2022-09-15 RX ADMIN — BARIUM SULFATE 20 ML: 0.81 POWDER, FOR SUSPENSION ORAL at 10:27

## 2022-09-15 NOTE — DISCHARGE SUMMARY
[address] [facility] RADIOLOGY  Modified Barium Swallow    SLP Individual Minutes  Time In: 5419  Time Out: 4336  Minutes: 20  Timed Code Treatment Minutes: 0 Minutes       Date: 9/15/2022  Patient Name: Gena Llamas      CSN: [id number]   : 1931  (80 y.o.)  Gender: female   Referring Physician:  KARTHIKEYAN Benton-CNP  Diagnosis: Dysphagia R13.10  History of Present Illness/Injury: Patient reports she has been having a hard time with consumption of meats. States meats often get stuck in the throat. Denies any coughing/throat clearing during meals. Patient is currently consuming a regular diet with thin liquids. Denies any pneumonia or food allergies. Per chart review, patient does have GERD but is unsure if she takes any medicine for it. Patient has a past medical history of Acute blood loss as cause of postoperative anemia, Alcohol abuse, Anxiety, Colon polyp, Depression, Gastric ulcer, GERD (gastroesophageal reflux disease), Hyperlipidemia, Hypertension, Hypertension, Hypothyroid, Incisional hernia of anterior abdominal wall without obstruction or gangrene, Neuropathy, peripheral, Osteoarthritis, Osteoporosis, Pneumoperitoneum, Thyroid disease, and Vitamin D deficiency. Current Diet: Regular diet with thin liquids    Pain: No pain reported. SUBJECTIVE:  Patient arrived to fluoroscopy suite in wheelchair but was able to ambulate to radiology chair without assistance. Pleasant and cooperative throughout. Daughter, Raoul Choi present for post procedural education, results and recommendations. OBJECTIVE:    Respiratory Status:  Room Air    Behavioral Observation:  Alert    PATIENT WAS EVALUATED USING:  Barium: Thin Liquids, Puree, Coarse Solids, and Mixed Consistency    ORAL PHASE MEENAKSHI SCORE: (Dysphagia outcome and severity scale)  7 = Normal in all situations    PHARYNGEAL PHASE MEENAKSHI SCORE: (Dysphagia outcome and severity scale)  6 = WFL/Modified Independent - May have mild pharyngeal delay or residue but clears spontaneously - No aspiration or laryngeal penetration    EVIDENCE FOR LARYNGEAL PENETRATION AND/OR ASPIRATION:  No evidence of aspiration  Laryngeal penetration evident with consecutive drinks of thin liquids, cleared spontaneously with completion of final swallow    PENETRATION-ASPIRATION SCALE (PAS): Thin Liquids: 2 = Material enters the airway, remains above vocal folds, and is ejected from the airway  Puree:  1 = Material does not enter the airway  Mixed Consistencies: 1 = Material does not enter the airway  Hard Solid: 1 = Material does not enter the airway    ESOPHAGEAL PHASE:   No significant findings    ATTEMPTED TECHNIQUES:  Small Bolus Size Effective    Straw Not Attempted    Cup Effective    Large Drinks Ineffective *transient/shallow penetration during the swallow   Consecutive Drinks Ineffective *transient/shallow penetration during the swallow   Mini Chen Not Attempted    Head Turn Not Attempted    Spoon Presentations Not Attempted    Volitional Cough Not Attempted    Spontaneous Cough Not Attempted           DIAGNOSTIC IMPRESSIONS:  Patient presents with a modified independent pharyngeal dysphagia as evidenced by the above skilled level findings. Oral phase highly unremarkable and functional for bolus manipulation and AP transit. Following AP transit, patient demonstrated very reduced base of tongue retraction which resulted in moderate stasis with solids along the base if the tongue spilling into the valleculae following completion of swallow. With additional swallows and a liquid wash, patient was able to clear this residue. Hyolaryngeal elevation/anterior excursion, epiglottic inversion and thyrohyoid approximation were functional for the swallow. Mildly reduced pharyngeal constriction evident. Consistent, transient penetration observed with consecutive and large drinks of thin liquids via cup.  No laryngeal penetration with singular drinks of thin liquids via cup. No aspiration. Diet Recommendations:  Regular diet with thin liquids  Strategies:  Full Upright Position, Small Bite/Sip, Multiple Swallow, and Alternate Solids and Liquids   Rehabilitation Potential: good    EDUCATION:  Learner: Patient, Daughter  Education:  Reviewed results and recommendations of this evaluation, Reviewed diet and strategies, Reviewed signs, symptoms and risks of aspiration, Reviewed ST goals and Plan of Care, Reviewed recommendations for follow-up, Education Related to Potential Risks and Complications Due to Impairment/Illness/Injury, Education Related to Prevention of Recurrence of Impairment/Illness/Injury, Education Related to [employer] and Wellness, and Home Safety Education  Evaluation of Education: Verbalizes understanding and Demonstrates with assistance    PLAN:  No further speech therapy services indicated.     PATIENT GOAL:    Reduce difficulties with consumption of meat        Ramírez Huddleston M.S. Kyler Thompson

## 2022-09-19 ENCOUNTER — HOSPITAL ENCOUNTER (OUTPATIENT)
Dept: GENERAL RADIOLOGY | Age: 87
Discharge: HOME | End: 2022-09-19
Payer: MEDICARE

## 2022-09-19 DIAGNOSIS — R13.10 PROBLEMS WITH SWALLOWING AND MASTICATION: ICD-10-CM

## 2022-09-19 PROCEDURE — A4641 RADIOPHARM DX AGENT NOC: HCPCS | Performed by: NURSE PRACTITIONER

## 2022-09-19 PROCEDURE — 6370000000 HC RX 637 (ALT 250 FOR IP): Performed by: NURSE PRACTITIONER

## 2022-09-19 PROCEDURE — 6360000004 HC RX CONTRAST MEDICATION: Performed by: NURSE PRACTITIONER

## 2022-09-19 PROCEDURE — 2500000003 HC RX 250 WO HCPCS: Performed by: NURSE PRACTITIONER

## 2022-09-19 PROCEDURE — 74220 X-RAY XM ESOPHAGUS 1CNTRST: CPT

## 2022-09-19 RX ADMIN — ANTACID/ANTIFLATULENT 1 EACH: 380; 550; 10; 10 GRANULE, EFFERVESCENT ORAL at 09:52

## 2022-09-19 RX ADMIN — BARIUM SULFATE 140 ML: 980 POWDER, FOR SUSPENSION ORAL at 09:52

## 2022-09-19 RX ADMIN — BARIUM SULFATE 1 TABLET: 700 TABLET ORAL at 09:51

## 2022-09-19 RX ADMIN — BARIUM SULFATE 50 ML: 0.6 SUSPENSION ORAL at 09:51

## 2022-09-28 DIAGNOSIS — N31.9 NEUROGENIC BLADDER: ICD-10-CM

## 2022-09-30 DIAGNOSIS — F41.9 ANXIETY: ICD-10-CM

## 2022-09-30 RX ORDER — LORAZEPAM 0.5 MG/1
TABLET ORAL
Qty: 120 TABLET | Refills: 0 | Status: SHIPPED | OUTPATIENT
Start: 2022-09-30 | End: 2022-11-21

## 2022-09-30 NOTE — TELEPHONE ENCOUNTER
Date of last visit:  7/19/2022  Date of next visit:  10/19/2022    Requested Prescriptions     Pending Prescriptions Disp Refills    LORazepam (ATIVAN) 0.5 MG tablet 120 tablet 0     Sig: take 1 tablet by mouth every 6 hours if needed for anxiety

## 2022-10-10 NOTE — TELEPHONE ENCOUNTER
Date of last visit:  7/19/2022  Date of next visit:  10/19/2022    Requested Prescriptions     Pending Prescriptions Disp Refills    pantoprazole (PROTONIX) 40 MG tablet [Pharmacy Med Name: PANTOPRAZOLE SOD DR 40 MG TAB] 180 tablet 1     Sig: take 1 tablet by mouth twice a day 30 MINUTES PRIOR TO BREAKFAST AND DINNER

## 2022-10-12 RX ORDER — PANTOPRAZOLE SODIUM 40 MG/1
TABLET, DELAYED RELEASE ORAL
Qty: 180 TABLET | Refills: 1 | Status: SHIPPED | OUTPATIENT
Start: 2022-10-12

## 2022-10-19 ENCOUNTER — OFFICE VISIT (OUTPATIENT)
Dept: FAMILY MEDICINE CLINIC | Age: 87
End: 2022-10-19

## 2022-10-19 VITALS
HEART RATE: 72 BPM | WEIGHT: 110.25 LBS | RESPIRATION RATE: 16 BRPM | DIASTOLIC BLOOD PRESSURE: 66 MMHG | SYSTOLIC BLOOD PRESSURE: 102 MMHG | HEIGHT: 60 IN | BODY MASS INDEX: 21.65 KG/M2

## 2022-10-19 DIAGNOSIS — M81.0 AGE-RELATED OSTEOPOROSIS WITHOUT CURRENT PATHOLOGICAL FRACTURE: ICD-10-CM

## 2022-10-19 DIAGNOSIS — E78.00 PURE HYPERCHOLESTEROLEMIA: ICD-10-CM

## 2022-10-19 DIAGNOSIS — N18.31 STAGE 3A CHRONIC KIDNEY DISEASE (HCC): ICD-10-CM

## 2022-10-19 DIAGNOSIS — G89.29 CHRONIC MIDLINE LOW BACK PAIN WITHOUT SCIATICA: ICD-10-CM

## 2022-10-19 DIAGNOSIS — N31.9 NEUROGENIC BLADDER: ICD-10-CM

## 2022-10-19 DIAGNOSIS — M46.1 SI (SACROILIAC) JOINT INFLAMMATION (HCC): ICD-10-CM

## 2022-10-19 DIAGNOSIS — K21.9 GASTROESOPHAGEAL REFLUX DISEASE WITHOUT ESOPHAGITIS: Primary | ICD-10-CM

## 2022-10-19 DIAGNOSIS — G60.9 IDIOPATHIC PERIPHERAL NEUROPATHY: ICD-10-CM

## 2022-10-19 DIAGNOSIS — Z23 NEED FOR INFLUENZA VACCINATION: ICD-10-CM

## 2022-10-19 DIAGNOSIS — G89.4 CHRONIC PAIN SYNDROME: ICD-10-CM

## 2022-10-19 DIAGNOSIS — F33.41 RECURRENT MAJOR DEPRESSIVE DISORDER, IN PARTIAL REMISSION (HCC): ICD-10-CM

## 2022-10-19 DIAGNOSIS — M54.50 CHRONIC MIDLINE LOW BACK PAIN WITHOUT SCIATICA: ICD-10-CM

## 2022-10-19 PROCEDURE — 1123F ACP DISCUSS/DSCN MKR DOCD: CPT | Performed by: FAMILY MEDICINE

## 2022-10-19 PROCEDURE — 99213 OFFICE O/P EST LOW 20 MIN: CPT | Performed by: FAMILY MEDICINE

## 2022-10-19 PROCEDURE — G8427 DOCREV CUR MEDS BY ELIG CLIN: HCPCS | Performed by: FAMILY MEDICINE

## 2022-10-19 PROCEDURE — 90688 IIV4 VACCINE SPLT 0.5 ML IM: CPT | Performed by: FAMILY MEDICINE

## 2022-10-19 PROCEDURE — 1036F TOBACCO NON-USER: CPT | Performed by: FAMILY MEDICINE

## 2022-10-19 PROCEDURE — G8420 CALC BMI NORM PARAMETERS: HCPCS | Performed by: FAMILY MEDICINE

## 2022-10-19 PROCEDURE — G0008 ADMIN INFLUENZA VIRUS VAC: HCPCS | Performed by: FAMILY MEDICINE

## 2022-10-19 PROCEDURE — 1090F PRES/ABSN URINE INCON ASSESS: CPT | Performed by: FAMILY MEDICINE

## 2022-10-19 RX ORDER — TRAMADOL HYDROCHLORIDE 50 MG/1
100 TABLET ORAL EVERY 8 HOURS PRN
Qty: 180 TABLET | Refills: 0 | Status: SHIPPED | OUTPATIENT
Start: 2022-10-19 | End: 2022-11-18

## 2022-10-19 ASSESSMENT — ENCOUNTER SYMPTOMS
BACK PAIN: 1
SORE THROAT: 1
NAUSEA: 0
COUGH: 0
CHEST TIGHTNESS: 0
WHEEZING: 0
EYE PAIN: 0
SHORTNESS OF BREATH: 0
CONSTIPATION: 0
ABDOMINAL PAIN: 0

## 2022-10-19 NOTE — PROGRESS NOTES
Subjective:      Patient ID: Davey Henry is a 80 y.o. female. Chronic  pain  back and  si  joint  pain  noted   and  use  of  neurontin  noted   and  trramadol    qid          Pharyngitis  This is a new problem. The current episode started in the past 7 days. The problem occurs intermittently. The problem has been gradually improving. Associated symptoms include a sore throat (left  throat  area). Pertinent negatives include no abdominal pain, arthralgias, chest pain, congestion, coughing, fatigue, fever, headaches, joint swelling, nausea, neck pain, numbness, rash or weakness. She has tried nothing for the symptoms. Back Pain  This is a chronic problem. The current episode started more than 1 year ago. The problem occurs daily. The problem has been resolved since onset. The pain is present in the lumbar spine. The quality of the pain is described as aching and burning. The pain is The same all the time. The symptoms are aggravated by bending. Pertinent negatives include no abdominal pain, chest pain, dysuria, fever, headaches, numbness, weakness or weight loss. The treatment provided mild relief. Gastroesophageal Reflux  She complains of a sore throat (left  throat  area). She reports no abdominal pain, no chest pain, no coughing, no nausea or no wheezing. This is a chronic problem. The current episode started more than 1 year ago. The problem occurs rarely. The problem has been resolved. The symptoms are aggravated by certain foods. Pertinent negatives include no fatigue or weight loss. She has tried a PPI for the symptoms. The treatment provided significant relief.    Past Medical History:   Diagnosis Date    Acute blood loss as cause of postoperative anemia 12/2/2017    Alcohol abuse sober since Salome Howard polyp     Dr. Hanna Pizarro    Depression     Gastric ulcer 11/2017     nathanael     GERD (gastroesophageal reflux disease)     PUD 1998 Dr. Karla Boland    Hyperlipidemia Hypertension 12/3/2017    Hypertension 12/3/2017    Hypothyroid     Incisional hernia of anterior abdominal wall without obstruction or gangrene     Neuropathy, peripheral     Osteoarthritis     Osteoporosis     Pneumoperitoneum 11/29/2017    Thyroid disease     hypothyroid    Vitamin D deficiency       Review of Systems   Constitutional:  Negative for appetite change, fatigue, fever and weight loss. HENT:  Positive for sore throat (left  throat  area). Negative for congestion, ear pain and postnasal drip. Eyes:  Negative for pain and visual disturbance. Respiratory:  Negative for cough, chest tightness, shortness of breath and wheezing. Cardiovascular:  Negative for chest pain, palpitations and leg swelling. Gastrointestinal:  Negative for abdominal pain, constipation and nausea. Genitourinary:  Negative for dysuria and frequency. Musculoskeletal:  Positive for back pain. Negative for arthralgias, joint swelling, neck pain and neck stiffness. Skin:  Negative for rash. Neurological:  Negative for dizziness, weakness, numbness and headaches. Hematological:  Negative for adenopathy. Does not bruise/bleed easily. Psychiatric/Behavioral:  Negative for behavioral problems and sleep disturbance. The patient is not nervous/anxious.          Past Medical History:   Diagnosis Date    Acute blood loss as cause of postoperative anemia 12/2/2017    Alcohol abuse sober since Tejinder Moody polyp     Dr. Annabel Meza    Depression     Gastric ulcer 11/2017     nathanael     GERD (gastroesophageal reflux disease)     PUD 1998 Dr. Abdalla Rojelio    Hyperlipidemia     Hypertension 12/3/2017    Hypertension 12/3/2017    Hypothyroid     Incisional hernia of anterior abdominal wall without obstruction or gangrene     Neuropathy, peripheral     Osteoarthritis     Osteoporosis     Pneumoperitoneum 11/29/2017    Thyroid disease     hypothyroid    Vitamin D deficiency         Physical Exam  Vitals and nursing note reviewed. Constitutional:       Appearance: She is well-developed. HENT:      Head: Normocephalic and atraumatic. Right Ear: External ear normal.      Left Ear: External ear normal.      Nose: Congestion present. Mouth/Throat:      Pharynx: Posterior oropharyngeal erythema (mild  redness) present. Eyes:      General: No scleral icterus. Conjunctiva/sclera: Conjunctivae normal.      Pupils: Pupils are equal, round, and reactive to light. Neck:      Thyroid: No thyromegaly. Vascular: No JVD. Cardiovascular:      Rate and Rhythm: Normal rate and regular rhythm. Heart sounds: Normal heart sounds. Pulmonary:      Effort: Pulmonary effort is normal.      Breath sounds: Normal breath sounds. No wheezing or rales. Abdominal:      General: Bowel sounds are normal. There is no distension. Palpations: Abdomen is soft. There is no mass. Tenderness: There is no abdominal tenderness. Musculoskeletal:         General: No tenderness. Normal range of motion. Cervical back: Normal range of motion and neck supple. Comments:    Low  back pain  noted    Lymphadenopathy:      Cervical: No cervical adenopathy. Skin:     General: Skin is warm and dry. Findings: No rash. Neurological:      Mental Status: She is alert and oriented to person, place, and time. Cranial Nerves: No cranial nerve deficit. Deep Tendon Reflexes: Reflexes are normal and symmetric. /66 (Site: Right Upper Arm, Position: Sitting, Cuff Size: Medium Adult)   Pulse 72   Resp 16   Ht 5' (1.524 m)   Wt 110 lb 4 oz (50 kg)   BMI 21.53 kg/m²    Assessment:        ICD-10-CM    1. Gastroesophageal reflux disease without esophagitis  K21.9       2. Chronic pain syndrome  G89.4       3. SI (sacroiliac) joint inflammation ([facility])  M46.1       4. Chronic midline low back pain without sciatica  M54.50     G89.29       5. Stage 3a chronic kidney disease ([facility])  N18.31       6.  Recurrent major depressive disorder, in partial remission ([facility] [address] [address]  F33.41       7. Pure hypercholesterolemia  E78.00       8. Age-related osteoporosis without current pathological fracture  M81.0       9. Neurogenic bladder  N31.9              Plan:      Current Outpatient Medications   Medication Sig Dispense Refill    traMADol (ULTRAM) 50 MG tablet Take 2 tablets by mouth every 8 hours as needed for Pain for up to 30 days. 180 tablet 0    pantoprazole (PROTONIX) 40 MG tablet take 1 tablet by mouth twice a day 30 MINUTES PRIOR TO BREAKFAST AND DINNER 180 tablet 1    LORazepam (ATIVAN) 0.5 MG tablet take 1 tablet by mouth every 6 hours if needed for anxiety 120 tablet 0    mirabegron (MYRBETRIQ) 50 MG TB24 take 1 tablet by mouth once daily 90 tablet 1    levothyroxine (SYNTHROID) 50 MCG tablet take 1 tablet by mouth once daily 90 tablet 1    gabapentin (NEURONTIN) 300 MG capsule Take 1 capsule by mouth nightly for 182 days. Intended supply: 90 days 90 capsule 1    PARoxetine (PAXIL) 10 MG tablet Take 1 tablet by mouth daily 90 tablet 1    ipratropium (ATROVENT) 0.03 % nasal spray 2 sprays by Each Nostril route every 12 hours 30 mL 5    Multiple Vitamins-Minerals (PRESERVISION/LUTEIN) CAPS Take 1 capsule by mouth 2 times daily. No current facility-administered medications for this visit. No orders of the defined types were placed in this encounter.          See in  3 mths   Juancarlos Renae MD

## 2022-10-19 NOTE — TELEPHONE ENCOUNTER
Date of last visit:  7/19/2022  Date of next visit:  10/19/2022    Requested Prescriptions     Pending Prescriptions Disp Refills    gabapentin (NEURONTIN) 300 MG capsule [Pharmacy Med Name: GABAPENTIN 300 MG CAPSULE] 90 capsule 1     Sig: take 1 capsule by mouth NIGHTLY

## 2022-10-19 NOTE — PROGRESS NOTES
Immunizations Administered       Name Date Dose Route    Influenza, AFLURIA (age 1 yrs+), FLUZONE, (age 10 mo+), MDV, 0.5mL 10/19/2022 0.5 mL Intramuscular    Site: Deltoid- Left    Lot: [id number]    NDC: 98783-641-98

## 2022-10-20 RX ORDER — GABAPENTIN 300 MG/1
CAPSULE ORAL
Qty: 90 CAPSULE | Refills: 1 | Status: SHIPPED | OUTPATIENT
Start: 2022-10-20 | End: 2023-04-18

## 2022-11-28 NOTE — TELEPHONE ENCOUNTER
Date of last visit:  1/11/2022  Date of next visit:  4/11/2022    Requested Prescriptions     Pending Prescriptions Disp Refills    gabapentin (NEURONTIN) 300 MG capsule 90 capsule 0     Sig: Take 1 capsule by mouth nightly for 90 days.  Intended supply: 90 days    pantoprazole (PROTONIX) 40 MG tablet 180 tablet 1     Sig: take 1 tablet by mouth twice daily 30 MINUTES PRIOR TO AM MEAL AND SUPPER NEW PATIENT    HISTORY OF PRESENT ILLNESS   10 y.o. Male with a history of right elbow pain who is referred today by Dr. Mirela Tabor..    He states he took a direct blow to the back of the right elbow after hitting stairs.  He developed some swelling in the back of the right elbow.  He is had pain over the past few weeks.  Some pain with baseball when he was throwing.  All the pain is located on the back of the elbow with no pain on the inside of the elbow.    Is affecting ADLs.  Pain is 5/10 at it's worst.        PAST MEDICAL HISTORY    Past Medical History:   Diagnosis Date    Failure to thrive in childhood     GERD (gastroesophageal reflux disease)     Laryngomalacia     Thrush        PAST SURGICAL HISTORY     Past Surgical History:   Procedure Laterality Date    ADENOIDECTOMY  4/4/2014    EXAMINATION UNDER ANESTHESIA Right 5/24/2019    Procedure: Exam under anesthesia;  Surgeon: Will Bassett MD;  Location: [address];  Service: ENT;  Laterality: Right;    MYRINGOTOMY WITH INSERTION OF VENTILATION TUBE Left 5/24/2019    Procedure: MYRINGOTOMY, WITH TYMPANOSTOMY TUBE INSERTION;  Surgeon: Will Bassett MD;  Location: [address];  Service: ENT;  Laterality: Left;    MYRINGOTOMY WITH INSERTION OF VENTILATION TUBE Bilateral 8/20/2021    Procedure: MYRINGOTOMY, WITH TYMPANOSTOMY TUBE INSERTION  Surgeon: Will Bassett MD;  Location: [address];  Service: ENT;  Laterality: Bilateral;    PET REMOVAL W/ PAPER PATCH MYRINGOPLASTY Bilateral 04/07/2017    Dr. Bassett    SUPRAGLOTTOPLASTY W/ MLB      TONSILLECTOMY      TYMPANOSTOMY TUBE PLACEMENT  4/4/2014    TYMPANOSTOMY TUBE PLACEMENT Bilateral 10/17/2017    Dr. Bassett       FAMILY HISTORY    Family History   Problem Relation Age of Onset    Allergies Mother     Allergies Father     Heart disease Father         unexplained cardiac arrest 2/2014    Cancer Maternal Grandmother     Congenital heart disease Neg Hx     Early death Neg Hx      Hearing loss Neg Hx     Seizures Neg Hx     Melanoma Neg Hx        SOCIAL HISTORY    Social History     Socioeconomic History    Marital status: Single   Tobacco Use    Smoking status: Never    Smokeless tobacco: Never   Substance and Sexual Activity    Alcohol use: Never   Social History Narrative    At home with parents and younger brother           MEDICATIONS    No current outpatient medications on file.    ALLERGIES     Review of patient's allergies indicates:  No Known Allergies      REVIEW OF SYSTEMS   Constitution: Negative. Negative for chills, fever and night sweats.   HENT: Negative for congestion and headaches.    Eyes: Negative for blurred vision, left vision loss and right vision loss.   Cardiovascular: Negative for chest pain and syncope.   Respiratory: Negative for cough and shortness of breath.    Endocrine: Negative for polydipsia, polyphagia and polyuria.   Hematologic/Lymphatic: Negative for bleeding problem. Does not bruise/bleed easily.   Skin: Negative for dry skin, itching and rash.   Musculoskeletal: Negative for falls. Positive for right elbow pain  Gastrointestinal: Negative for abdominal pain and bowel incontinence.   Genitourinary: Negative for bladder incontinence and nocturia.   Neurological: Negative for disturbances in coordination, loss of balance and seizures.   Psychiatric/Behavioral: Negative for depression. The patient does not have insomnia.    Allergic/Immunologic: Negative for hives and persistent infections.     PHYSICAL EXAMINATION    Vitals: BP (!) 82/54   Pulse 80   Wt 32.8 kg (72 lb 4.8 oz)   BMI 16.97 kg/m²     General: The patient appears active and healthy with no apparent physical problems.  No disturbance of mood or affect is demonstrated. Alert and Oriented.    HEENT: Eyes normal, pupils equally round, nose normal.    Resp: Equal and symmetrical chest rises. No wheezing    CV: Regular rate    Neck: Supple; nonpainful range of motion.    Extremities: no  cyanosis, clubbing, edema, or diffuse swelling.  Palpable pulses, good capillary refill of the digits.  No coolness, discoloration, edema or obvious varicosities.    Skin: no lesions noted.    Lymphatic: no detected adenopathy in the upper or lower extremities.    Neurologic: normal mental status, normal reflexes, normal gait and balance.  Patient is alert and oriented to person, place and time.  No flaccidity or spasticity is noted.  No motor or sensory deficits are noted.  Light touch is intact    Orthopaedic:     Elbow Exam-RIGHT    Inspection: Normal skin color and appearance, no ecchymosis, no swelling, no scars.  There is a normal carrying angle.      ROM: 0° - 135+° with 80° supination and 80° pronation.       Palpation: No tenderness at the medial epicondyle, radial head, or lateral epicondyle.  Tender over the posterior olecranon soft tissue swelling    The wrist flexor-pronator muscle group is nontender.    The wrist extensor mobile wad is nontender.    Strength: Flexor and extensor motor strength is 5/5.      Stability: Varus and valgus stress reveals no instability. No Guarding    Neuro Reflexes are 2/2 biceps, brachioradialis and triceps. Sensation intact    Test: - Milking maneuver - VEO - Thinker's - Tinel's Sign - Ulnar nerve subluxation - Hook Test - Pain with resisted wrist ext - Pain with long finger ext      IMAGING    XR ELBOW COMPLETE 3 VIEW RIGHT  Narrative: EXAMINATION:  XR ELBOW COMPLETE 3 VIEW RIGHT    CLINICAL HISTORY:  . Pain in right elbow    TECHNIQUE:  AP, lateral, and oblique views of the right elbow were performed.    COMPARISON:  None    FINDINGS:  Skeletally immature patient.    No definite evidence of acute fracture or dislocation.  Joint spaces appear grossly maintained.  No large elbow joint effusion.  No physeal irregularity or asymmetry suggested.  No evidence of radiopaque foreign body.  Impression: No convincing evidence of acute fracture or dislocation.    Electronically  signed by: Bud Rivers  Date:    11/24/2022  Time:    10:26      IMPRESSION       ICD-10-CM ICD-9-CM   1. Subperiosteal hematoma  T14.8XXA 267       MEDICATIONS PRESCRIBED      None    RECOMMENDATIONS     Over-the-counter Tylenol and Motrin  Activity modification  Protection as needed  Return to clinic 2 weeks      All questions were answered, pt will contact us for questions or concerns in the interim.

## 2022-12-02 DIAGNOSIS — F41.9 ANXIETY: ICD-10-CM

## 2022-12-02 NOTE — TELEPHONE ENCOUNTER
Date of last visit:  10/19/2022  Date of next visit:  1/24/2023    Requested Prescriptions     Pending Prescriptions Disp Refills    LORazepam (ATIVAN) 0.5 MG tablet 120 tablet 0     Sig: take 1 tablet by mouth every 6 hours if needed for anxiety

## 2022-12-02 NOTE — TELEPHONE ENCOUNTER
Mart Thompson called requesting a refill of their:    LORazepam (ATIVAN) 0.5 MG tablet take 1 tablet by mouth every 6 hours if needed for anxiety    Send to [facility] on [address]

## 2022-12-03 RX ORDER — LORAZEPAM 0.5 MG/1
TABLET ORAL
Qty: 120 TABLET | Refills: 0 | Status: SHIPPED | OUTPATIENT
Start: 2022-12-03 | End: 2023-01-23

## 2022-12-07 ENCOUNTER — ANESTHESIA EVENT (OUTPATIENT)
Dept: ENDOSCOPY | Age: 87
End: 2022-12-07
Payer: MEDICARE

## 2022-12-07 ENCOUNTER — HOSPITAL ENCOUNTER (OUTPATIENT)
Age: 87
Setting detail: OUTPATIENT SURGERY
Discharge: HOME | End: 2022-12-07
Attending: INTERNAL MEDICINE | Admitting: INTERNAL MEDICINE
Payer: MEDICARE

## 2022-12-07 ENCOUNTER — ANESTHESIA (OUTPATIENT)
Dept: ENDOSCOPY | Age: 87
End: 2022-12-07
Payer: MEDICARE

## 2022-12-07 VITALS
HEART RATE: 72 BPM | OXYGEN SATURATION: 96 % | SYSTOLIC BLOOD PRESSURE: 137 MMHG | TEMPERATURE: 96 F | HEIGHT: 60 IN | WEIGHT: 109.4 LBS | BODY MASS INDEX: 21.48 KG/M2 | RESPIRATION RATE: 16 BRPM | DIASTOLIC BLOOD PRESSURE: 73 MMHG

## 2022-12-07 PROCEDURE — 2580000003 HC RX 258: Performed by: NURSE ANESTHETIST, CERTIFIED REGISTERED

## 2022-12-07 PROCEDURE — 2580000003 HC RX 258: Performed by: INTERNAL MEDICINE

## 2022-12-07 PROCEDURE — 3609017700 HC EGD DILATION GASTRIC/DUODENAL STRICTURE: Performed by: INTERNAL MEDICINE

## 2022-12-07 PROCEDURE — 7100000011 HC PHASE II RECOVERY - ADDTL 15 MIN: Performed by: INTERNAL MEDICINE

## 2022-12-07 PROCEDURE — 3700000001 HC ADD 15 MINUTES (ANESTHESIA): Performed by: INTERNAL MEDICINE

## 2022-12-07 PROCEDURE — 2500000003 HC RX 250 WO HCPCS: Performed by: NURSE ANESTHETIST, CERTIFIED REGISTERED

## 2022-12-07 PROCEDURE — 7100000010 HC PHASE II RECOVERY - FIRST 15 MIN: Performed by: INTERNAL MEDICINE

## 2022-12-07 PROCEDURE — 6360000002 HC RX W HCPCS: Performed by: NURSE ANESTHETIST, CERTIFIED REGISTERED

## 2022-12-07 PROCEDURE — 2720000010 HC SURG SUPPLY STERILE: Performed by: INTERNAL MEDICINE

## 2022-12-07 PROCEDURE — 3700000000 HC ANESTHESIA ATTENDED CARE: Performed by: INTERNAL MEDICINE

## 2022-12-07 RX ORDER — SODIUM CHLORIDE 9 MG/ML
INJECTION, SOLUTION INTRAVENOUS CONTINUOUS PRN
Status: DISCONTINUED | OUTPATIENT
Start: 2022-12-07 | End: 2022-12-07 | Stop reason: SDUPTHER

## 2022-12-07 RX ORDER — PROPOFOL 10 MG/ML
INJECTION, EMULSION INTRAVENOUS PRN
Status: DISCONTINUED | OUTPATIENT
Start: 2022-12-07 | End: 2022-12-07 | Stop reason: SDUPTHER

## 2022-12-07 RX ORDER — LIDOCAINE HYDROCHLORIDE 20 MG/ML
INJECTION, SOLUTION INFILTRATION; PERINEURAL PRN
Status: DISCONTINUED | OUTPATIENT
Start: 2022-12-07 | End: 2022-12-07 | Stop reason: SDUPTHER

## 2022-12-07 RX ORDER — SODIUM CHLORIDE 0.9 % (FLUSH) 0.9 %
5-40 SYRINGE (ML) INJECTION EVERY 12 HOURS SCHEDULED
Status: DISCONTINUED | OUTPATIENT
Start: 2022-12-07 | End: 2022-12-07 | Stop reason: HOSPADM

## 2022-12-07 RX ORDER — SODIUM CHLORIDE 0.9 % (FLUSH) 0.9 %
5-40 SYRINGE (ML) INJECTION PRN
Status: DISCONTINUED | OUTPATIENT
Start: 2022-12-07 | End: 2022-12-07 | Stop reason: HOSPADM

## 2022-12-07 RX ORDER — SODIUM CHLORIDE 9 MG/ML
25 INJECTION, SOLUTION INTRAVENOUS PRN
Status: DISCONTINUED | OUTPATIENT
Start: 2022-12-07 | End: 2022-12-07 | Stop reason: HOSPADM

## 2022-12-07 RX ORDER — TRAMADOL HYDROCHLORIDE 50 MG/1
50 TABLET ORAL EVERY 6 HOURS PRN
COMMUNITY

## 2022-12-07 RX ADMIN — PROPOFOL 50 MG: 10 INJECTION, EMULSION INTRAVENOUS at 13:04

## 2022-12-07 RX ADMIN — SODIUM CHLORIDE 25 ML: 9 INJECTION, SOLUTION INTRAVENOUS at 12:07

## 2022-12-07 RX ADMIN — SODIUM CHLORIDE: 9 INJECTION, SOLUTION INTRAVENOUS at 13:03

## 2022-12-07 RX ADMIN — PROPOFOL 30 MG: 10 INJECTION, EMULSION INTRAVENOUS at 13:07

## 2022-12-07 RX ADMIN — LIDOCAINE HYDROCHLORIDE 140 MG: 20 INJECTION, SOLUTION INFILTRATION; PERINEURAL at 13:04

## 2022-12-07 ASSESSMENT — PAIN - FUNCTIONAL ASSESSMENT: PAIN_FUNCTIONAL_ASSESSMENT: NONE - DENIES PAIN

## 2022-12-07 NOTE — ANESTHESIA POSTPROCEDURE EVALUATION
Department of Anesthesiology  Postprocedure Note    Patient: Brigitte Morales  MRN: [id number]  YOB: 1931  Date of evaluation: 12/7/2022      Procedure Summary     Date: 12/07/22 Room / Location: [address] [place] [address] / [address] [place] [address]    Anesthesia Start: 8009 Anesthesia Stop: 3388    Procedure: EGD DILATATION (Left: Esophagus) Diagnosis:       Esophageal dysphagia      (Esophageal dysphagia [R13.19])    Surgeons: Desiree Jacobson MD Responsible Provider: Raj Knight DO    Anesthesia Type: MAC ASA Status: 2          Anesthesia Type: MAC    Love Phase I: Love Score: 10    Love Phase II:        Anesthesia Post Evaluation    Patient location during evaluation: bedside  Patient participation: complete - patient participated  Level of consciousness: awake  Pain score: 0  Airway patency: patent  Nausea & Vomiting: no nausea and no vomiting  Complications: no  Cardiovascular status: hemodynamically stable  Respiratory status: acceptable  Hydration status: stable

## 2022-12-07 NOTE — PROGRESS NOTES
EGD completed, Pictures taken. Dilated with 54 FR dilator. End in tact when removed. Pt tolerated well.      Scope  Used

## 2022-12-07 NOTE — PROGRESS NOTES
ENDOSCOPY POSTPROCEDURE REPORT     PT NAME: Chip Eaton [facility]  MEDICAL RECORD NUMBER: [id number]  YOB: 1931    PROCEDURE PERFORMED BY : Karl Bradford MD    PROCEDURE TYPE:   EGD __x____   COLONOSCOPY _______   ERCP ________  MEDICATIONS USED :  VERSED _____   FENTANYL_____   PROPOFOL ___x___  Patient tolerated procedure well. No apparent complications. Estimated blood loss:  Nil  Specimens removed:  Yes_____  No___x___  Disposition of Specimens:  To Lab      BRIEF  FINDINGS:  Mild-Mod HH  2. Tortuous distal esophagus o/w normal. No mass or obvious stricture or lesion  3.o/w normal  4.  54F Am dil with mild resistance    PRELIMINARY PLAN:  Follow for clinical response  2. If symptoms persist discuss Botox injections at [facility]      For complete findings and plan, see dictated report.      Karl Bradford MD, MD  12/7/2022  1:27 PM

## 2022-12-07 NOTE — PROGRESS NOTES
Recovery mode. Patient denies discomfort. Passing gas, taking fluids.  discussed findings with patient and daughters. Discharge instructions provided and understanding verbalized.

## 2022-12-08 NOTE — PROCEDURES
[address] [place] [address]                                 PROCEDURE NOTE    PATIENT NAME: Tomy Berkowitz                    :        1931  MED REC NO:   [id number]                           ROOM:  ACCOUNT NO:   [de-identified]                           ADMIT DATE: 2022  PROVIDER:     Monroe Conrad. TYRESE Brown Jump:  2022    PROCEDURE:  Upper endoscopy with esophageal dilatation. ESTIMATED BLOOD LOSS: nil    INDICATIONS:  The patient is a 59-year-old white female who was seen in  the office due to dysphagia. As a portion of her evaluation and  management, upper endoscopy with esophageal dilatation was planned. The  procedure was discussed with her. Following discussion, she expressed  understanding and did wish to proceed. OPERATIVE PROCEDURE:  The patient arrived to [address] [place] [address]  Endoscopy Department as an outpatient. She was placed on the  appropriate monitoring devices. She was placed in the left lateral  decubitus position. Sedation provided by the nurse anesthetist using  propofol. Then, the Olympus video upper endoscope passed gently across  the cricopharyngeal musculature into the esophagus under direct  endoscopic visualization. The upper and middle portions of the  esophagus were normal.  The lower esophagus was normal except there were  some tortuosity and tertiary type esophageal contractions noted. The  mucosa was all normal.  The squamocolumnar junction was discrete and  intact located at the level of the EG junction. Multiple passes were  made, again unremarkable. The scope was advanced into the stomach and  then through the stomach across pylorus into the descending duodenum,  withdrawn back, visualized portion normal, brought back, duodenal bulb  normal, brought back across pylorus and the antrum. Antrum was normal.   Scope was retroflexed.   Cardia, fundus, EG junction examined. Initially, the stomach did not distend well. Later the stomach did  distend nicely and the cardia, fundus, EG junction examined. There was  a mild-to-moderate hiatal hernia, otherwise the retroflexed view was  normal.  The EG junction was normal in retroflexion. Scope was  straightened. The gastric body visualized in forward view, this was  normal.  Subsequently, scope was advanced to the prepyloric antrum. A  guidewire was advanced to the biopsy channel of the scope. Scope was  exchanged and removed over the guidewire. Then, a 54-Filipino American  dilator was advanced over the guidewire in standard fashion without  difficulty. There was mild resistance with dilator passage. Subsequently, the dilator guidewire complex was removed in toto. The  patient tolerated the procedure well. No apparent complications. Photographic documentation was obtained and will be printed. IMPRESSION:  1. Endoscopically normal-appearing esophagus. There were some tertiary  type esophageal contractions distally. The mucosa was all normal.  2.  Mild-to-moderate sized hiatal hernia. 3.  Otherwise normal exam.  4.  Esophageal dilatation performed using a 54-Filipino American dilator  in standard fashion without difficulty due to the patient's history of  dysphagia. ASSESSMENT:  As described above, upper endoscopy was rather  unremarkable. No significant abnormalities or pathology was present. Esophageal dilatation was done. Hopefully, this helps alleviate the  patient's dysphagia. PLAN:  1. Resume medications including acid suppression. 2.  The patient continues to have symptoms, consider Botox injections  around the EG junction as there may be an underlying motility disorder. Further plans based on clinical course. Carly Funes M.D.    D: 12/07/2022 13:34:44       T: 12/07/2022 13:37:49     [email or web address]  Job#: [id number]     Doc#: [id number]    CC:  Kenyatta Hunt M.D.

## 2022-12-10 DIAGNOSIS — G89.4 CHRONIC PAIN SYNDROME: Primary | ICD-10-CM

## 2022-12-10 DIAGNOSIS — M54.50 CHRONIC MIDLINE LOW BACK PAIN WITHOUT SCIATICA: ICD-10-CM

## 2022-12-10 DIAGNOSIS — G89.29 CHRONIC MIDLINE LOW BACK PAIN WITHOUT SCIATICA: ICD-10-CM

## 2022-12-12 ENCOUNTER — OFFICE VISIT (OUTPATIENT)
Dept: FAMILY MEDICINE CLINIC | Age: 87
End: 2022-12-12

## 2022-12-12 ENCOUNTER — NURSE ONLY (OUTPATIENT)
Dept: LAB | Age: 87
End: 2022-12-12

## 2022-12-12 VITALS
WEIGHT: 110.8 LBS | RESPIRATION RATE: 12 BRPM | DIASTOLIC BLOOD PRESSURE: 98 MMHG | BODY MASS INDEX: 21.75 KG/M2 | HEART RATE: 80 BPM | HEIGHT: 60 IN | SYSTOLIC BLOOD PRESSURE: 158 MMHG | TEMPERATURE: 97 F

## 2022-12-12 DIAGNOSIS — R19.7 DIARRHEA, UNSPECIFIED TYPE: ICD-10-CM

## 2022-12-12 DIAGNOSIS — R35.0 FREQUENCY OF URINATION: ICD-10-CM

## 2022-12-12 DIAGNOSIS — K21.9 GASTROESOPHAGEAL REFLUX DISEASE WITHOUT ESOPHAGITIS: ICD-10-CM

## 2022-12-12 DIAGNOSIS — R53.83 OTHER FATIGUE: ICD-10-CM

## 2022-12-12 DIAGNOSIS — R68.89 BLOOD PRESSURE ALTERATION: ICD-10-CM

## 2022-12-12 DIAGNOSIS — F41.9 ANXIETY: ICD-10-CM

## 2022-12-12 DIAGNOSIS — R53.83 OTHER FATIGUE: Primary | ICD-10-CM

## 2022-12-12 LAB
ALBUMIN SERPL-MCNC: 4 G/DL (ref 3.5–5.1)
ALP BLD-CCNC: 98 U/L (ref 38–126)
ALT SERPL-CCNC: 12 U/L (ref 11–66)
ANION GAP SERPL CALCULATED.3IONS-SCNC: 14 MEQ/L (ref 8–16)
AST SERPL-CCNC: 27 U/L (ref 5–40)
BACTERIA: ABNORMAL /HPF
BASOPHILS # BLD: 0.4 %
BASOPHILS ABSOLUTE: 0 THOU/MM3 (ref 0–0.1)
BILIRUB SERPL-MCNC: 0.2 MG/DL (ref 0.3–1.2)
BILIRUBIN URINE: NEGATIVE
BLOOD, URINE: NEGATIVE
BUN BLDV-MCNC: 12 MG/DL (ref 7–22)
CALCIUM SERPL-MCNC: 10.4 MG/DL (ref 8.5–10.5)
CASTS UA: ABNORMAL /LPF
CHARACTER, URINE: CLEAR
CHLORIDE BLD-SCNC: 101 MEQ/L (ref 98–111)
CO2: 26 MEQ/L (ref 23–33)
COLOR: YELLOW
CREAT SERPL-MCNC: 1 MG/DL (ref 0.4–1.2)
CRYSTALS, UA: ABNORMAL
EOSINOPHIL # BLD: 0.1 %
EOSINOPHILS ABSOLUTE: 0 THOU/MM3 (ref 0–0.4)
EPITHELIAL CELLS, UA: ABNORMAL /HPF
ERYTHROCYTE [DISTWIDTH] IN BLOOD BY AUTOMATED COUNT: 13.8 % (ref 11.5–14.5)
ERYTHROCYTE [DISTWIDTH] IN BLOOD BY AUTOMATED COUNT: 47.5 FL (ref 35–45)
GFR SERPL CREATININE-BSD FRML MDRD: 53 ML/MIN/1.73M2
GLUCOSE BLD-MCNC: 123 MG/DL (ref 70–108)
GLUCOSE URINE: NEGATIVE MG/DL
HCT VFR BLD CALC: 36 % (ref 37–47)
HEMOGLOBIN: 11.6 GM/DL (ref 12–16)
IMMATURE GRANS (ABS): 0.02 THOU/MM3 (ref 0–0.07)
IMMATURE GRANULOCYTES: 0.3 %
KETONES, URINE: NEGATIVE
LEUKOCYTE ESTERASE, URINE: ABNORMAL
LYMPHOCYTES # BLD: 19 %
LYMPHOCYTES ABSOLUTE: 1.3 THOU/MM3 (ref 1–4.8)
MCH RBC QN AUTO: 30.4 PG (ref 26–33)
MCHC RBC AUTO-ENTMCNC: 32.2 GM/DL (ref 32.2–35.5)
MCV RBC AUTO: 94.2 FL (ref 81–99)
MONOCYTES # BLD: 4.6 %
MONOCYTES ABSOLUTE: 0.3 THOU/MM3 (ref 0.4–1.3)
NITRITE, URINE: NEGATIVE
NUCLEATED RED BLOOD CELLS: 0 /100 WBC
PH UA: 7.5 (ref 5–9)
PLATELET # BLD: 339 THOU/MM3 (ref 130–400)
PMV BLD AUTO: 10.6 FL (ref 9.4–12.4)
POTASSIUM SERPL-SCNC: 3.9 MEQ/L (ref 3.5–5.2)
PROTEIN UA: NEGATIVE
RBC # BLD: 3.82 MILL/MM3 (ref 4.2–5.4)
RBC URINE: ABNORMAL /HPF
SEG NEUTROPHILS: 75.6 %
SEGMENTED NEUTROPHILS ABSOLUTE COUNT: 5.2 THOU/MM3 (ref 1.8–7.7)
SODIUM BLD-SCNC: 141 MEQ/L (ref 135–145)
SPECIFIC GRAVITY, URINE: 1.02 (ref 1–1.03)
TOTAL PROTEIN: 7.9 G/DL (ref 6.1–8)
TROPONIN T: < 0.01 NG/ML
UROBILINOGEN, URINE: 0.2 EU/DL (ref 0–1)
WBC # BLD: 6.9 THOU/MM3 (ref 4.8–10.8)
WBC UA: ABNORMAL /HPF

## 2022-12-12 PROCEDURE — 1090F PRES/ABSN URINE INCON ASSESS: CPT | Performed by: FAMILY MEDICINE

## 2022-12-12 PROCEDURE — 1123F ACP DISCUSS/DSCN MKR DOCD: CPT | Performed by: FAMILY MEDICINE

## 2022-12-12 PROCEDURE — 99215 OFFICE O/P EST HI 40 MIN: CPT | Performed by: FAMILY MEDICINE

## 2022-12-12 PROCEDURE — G8427 DOCREV CUR MEDS BY ELIG CLIN: HCPCS | Performed by: FAMILY MEDICINE

## 2022-12-12 PROCEDURE — 1036F TOBACCO NON-USER: CPT | Performed by: FAMILY MEDICINE

## 2022-12-12 PROCEDURE — G8420 CALC BMI NORM PARAMETERS: HCPCS | Performed by: FAMILY MEDICINE

## 2022-12-12 PROCEDURE — 93000 ELECTROCARDIOGRAM COMPLETE: CPT | Performed by: FAMILY MEDICINE

## 2022-12-12 RX ORDER — CIPROFLOXACIN 250 MG/1
250 TABLET, FILM COATED ORAL 2 TIMES DAILY
Qty: 14 TABLET | Refills: 0 | Status: SHIPPED | OUTPATIENT
Start: 2022-12-12 | End: 2022-12-19

## 2022-12-12 ASSESSMENT — ENCOUNTER SYMPTOMS
EYES NEGATIVE: 1
BLOOD IN STOOL: 0
SHORTNESS OF BREATH: 0
CHEST TIGHTNESS: 0
ANAL BLEEDING: 0
VOMITING: 0
NAUSEA: 0
CONSTIPATION: 0
DIARRHEA: 1
COUGH: 0
ABDOMINAL PAIN: 0

## 2022-12-12 NOTE — PROGRESS NOTES
Date: 12/12/2022  Here with her daughter Harvey Henderson is a 80 y.o. female who presents today for:  Chief Complaint   Patient presents with    Diarrhea    Urinary Frequency since Saturday AM     Fatigue    Chills    Shaking she feels shaky but it is not visible   She had her throat stretched last week on Wednesday        HPI:     Diarrhea   This is a new problem. The current episode started in the past 7 days. The problem has been unchanged. The patient states that diarrhea does not awaken her from sleep. Associated symptoms include chills. Pertinent negatives include no abdominal pain, coughing, fever, headaches or vomiting. Nothing aggravates the symptoms. There are no known risk factors. She has tried nothing for the symptoms. Urinary Frequency   This is a new problem. The current episode started in the past 7 days. The problem occurs intermittently. The pain is mild. There has been no fever. Associated symptoms include chills, frequency and urgency. Pertinent negatives include no discharge, flank pain, hematuria, nausea or vomiting. She has tried nothing for the symptoms. Fatigue  This is a new problem. The current episode started in the past 7 days. The problem occurs constantly. The problem has been unchanged. Associated symptoms include chills and fatigue (she feels weak. ). Pertinent negatives include no abdominal pain, anorexia, chest pain, coughing, diaphoresis, fever, headaches, nausea, numbness, rash, vomiting or weakness. Nothing aggravates the symptoms. She has tried nothing for the symptoms. has a current medication list which includes the following prescription(s): tramadol, lorazepam, gabapentin, pantoprazole, mirabegron, levothyroxine, paroxetine, ipratropium, and preservision/lutein.     Allergies   Allergen Reactions    Amoxicillin-Pot Clavulanate Diarrhea    Macrobid [Nitrofurantoin]     Statins Support Therapy      Unaware of allery    Sulfa Antibiotics Nausea Only       Social History     Tobacco Use    Smoking status: Former     Types: Cigarettes     Quit date: 1963     Years since quittin.9    Smokeless tobacco: Never   Vaping Use    Vaping Use: Never used   Substance Use Topics    Alcohol use: No     Comment: former alcoholic sober since 60    Drug use: No       Past Medical History:   Diagnosis Date    Acute blood loss as cause of postoperative anemia 2017    Alcohol abuse sober since [facility] polyp     Dr. Danii Heredia    Depression     Gastric ulcer 2017     [facility]     GERD (gastroesophageal reflux disease)     PUD  Dr. Jami Vivas    Hyperlipidemia     Hypertension 12/3/2017    Hypertension 12/3/2017    Hypothyroid     Incisional hernia of anterior abdominal wall without obstruction or gangrene     Neuropathy, peripheral     Osteoarthritis     Osteoporosis     Pneumoperitoneum 2017    Thyroid disease     hypothyroid    Vitamin D deficiency        Past Surgical History:   Procedure Laterality Date    CHOLECYSTECTOMY      COLONOSCOPY        [facility]    ENDOSCOPY, COLON, DIAGNOSTIC      [facility]    EYE SURGERY      HEMORRHOID SURGERY      HERNIA REPAIR      left    JOINT REPLACEMENT      bilat knees. Dr. Randal Martinez N/A 2017    EXPLORATORY LAPAROTOMY, PROBABLE ULCER REPAIR performed by Kirit Patel DO at [place] [id number]  2017    ulcer  repair   [facility]     TONSILLECTOMY      UPPER GASTROINTESTINAL ENDOSCOPY        [facility]    UPPER GASTROINTESTINAL ENDOSCOPY Left 2022    EGD DILATATION performed by Roxanna Matos MD at [address] [facility]       Family History   Problem Relation Age of Onset    Bleeding Prob Mother     Stroke Mother     Heart Attack Father     Heart Disease Father     Breast Cancer Daughter 62    Cancer Son         head/neck     Subjective:     Review of Systems   Constitutional:  Positive for chills and fatigue (she feels weak. ).  Negative for activity change, appetite change, diaphoresis and fever. HENT: Negative. Eyes: Negative. Respiratory:  Negative for cough, chest tightness and shortness of breath. Cardiovascular:  Negative for chest pain, palpitations and leg swelling. Gastrointestinal:  Positive for diarrhea (loose BM). Negative for abdominal pain, anal bleeding, anorexia, blood in stool, constipation, nausea and vomiting. Genitourinary:  Positive for frequency and urgency. Negative for dysuria, flank pain and hematuria. Musculoskeletal: Negative. Skin: Negative. Negative for rash. Neurological:  Negative for dizziness, syncope, weakness, light-headedness, numbness and headaches. Psychiatric/Behavioral: Negative.       :   BP (!) 158/98 (Site: Right Upper Arm, Position: Sitting, Cuff Size: Medium Adult)   Pulse 80   Temp 97 °F (36.1 °C)   Resp 12   Ht 5' (1.524 m)   Wt 110 lb 12.8 oz (50.3 kg)   BMI 21.64 kg/m²   Wt Readings from Last 3 Encounters:   12/12/22 110 lb 12.8 oz (50.3 kg)   12/07/22 109 lb 6.4 oz (49.6 kg)   10/19/22 110 lb 4 oz (50 kg)     Physical Exam  Vitals and nursing note reviewed. Constitutional:       General: She is not in acute distress. Appearance: She is well-developed. She is not diaphoretic. HENT:      Head: Normocephalic and atraumatic. Eyes:      General: No scleral icterus. Right eye: No discharge. Left eye: No discharge. Conjunctiva/sclera: Conjunctivae normal.      Pupils: Pupils are equal, round, and reactive to light. Neck:      Thyroid: No thyromegaly. Vascular: No JVD. Cardiovascular:      Rate and Rhythm: Normal rate and regular rhythm. Heart sounds: Normal heart sounds. No murmur heard. Pulmonary:      Effort: No respiratory distress. Breath sounds: Normal breath sounds. No wheezing, rhonchi or rales. Abdominal:      General: Bowel sounds are normal. There is no distension. Palpations: Abdomen is soft. There is no mass. Tenderness: There is no abdominal tenderness. There is no guarding or rebound. Musculoskeletal:         General: Normal range of motion. Cervical back: Normal range of motion and neck supple. Lymphadenopathy:      Cervical: No cervical adenopathy. Skin:     General: Skin is warm and dry. Findings: No rash. Neurological:      Mental Status: She is alert and oriented to person, place, and time. Psychiatric:         Behavior: Behavior normal.     :       Diagnosis Orders   1. Other fatigue  CBC with Auto Differential    Comprehensive Metabolic Panel    Troponin    EKG 12 lead      2. Blood pressure alteration  Troponin    EKG 12 lead      3. Diarrhea, unspecified type  Gastrointestinal Panel, Molecular      4. Frequency of urination  ciprofloxacin (CIPRO) 250 MG tablet      5. Gastroesophageal reflux disease without esophagitis  She is on Protonix      6. Anxiety  She is on Ativan and Paxil          :      Requested Prescriptions      No prescriptions requested or ordered in this encounter     Current Outpatient Medications   Medication Sig Dispense Refill    traMADol (ULTRAM) 50 MG tablet Take 50 mg by mouth every 6 hours as needed for Pain. LORazepam (ATIVAN) 0.5 MG tablet take 1 tablet by mouth every 6 hours if needed for anxiety 120 tablet 0    gabapentin (NEURONTIN) 300 MG capsule take 1 capsule by mouth NIGHTLY 90 capsule 1    pantoprazole (PROTONIX) 40 MG tablet take 1 tablet by mouth twice a day 30 MINUTES PRIOR TO BREAKFAST AND DINNER 180 tablet 1    mirabegron (MYRBETRIQ) 50 MG TB24 take 1 tablet by mouth once daily 90 tablet 1    levothyroxine (SYNTHROID) 50 MCG tablet take 1 tablet by mouth once daily 90 tablet 1    PARoxetine (PAXIL) 10 MG tablet Take 1 tablet by mouth daily 90 tablet 1    ipratropium (ATROVENT) 0.03 % nasal spray 2 sprays by Each Nostril route every 12 hours 30 mL 5    Multiple Vitamins-Minerals (PRESERVISION/LUTEIN) CAPS Take 1 capsule by mouth 2 times daily. No current facility-administered medications for this visit. Orders Placed This Encounter   Procedures    Gastrointestinal Panel, Molecular     Standing Status:   Future     Standing Expiration Date:   12/12/2023    CBC with Auto Differential     Standing Status:   Future     Standing Expiration Date:   12/12/2023    Comprehensive Metabolic Panel     Standing Status:   Future     Standing Expiration Date:   12/12/2023    Urinalysis with Reflex to Culture     Standing Status:   Future     Standing Expiration Date:   12/12/2023     Order Specific Question:   SPECIFY(EX-CATH,MIDSTREAM,CYSTO,ETC)? Answer:   midstream    Troponin     Standing Status:   Future     Standing Expiration Date:   12/12/2023       Continue current medications. Return in about 2 days (around 12/14/2022). Discussed use, benefit, and side effects of prescribed medications. All patient questions answered. Pt voiced understanding. Instructed to continue current medications,diet and exercise. Patient agreed with treatment plan. Allergies, Problem List, Medications, Medical History, Family History, Surgical History and Tobacco History reviewed by provider.

## 2022-12-12 NOTE — TELEPHONE ENCOUNTER
Date of last visit:  10/19/2022  Date of next visit:  1/24/2023    Requested Prescriptions     Pending Prescriptions Disp Refills    traMADol (ULTRAM) 50 MG tablet [Pharmacy Med Name: TRAMADOL HCL 50 MG TABLET] 180 tablet      Sig: take 2 tablet by mouth every 8 hours if needed for up to 30 DAYS

## 2022-12-13 ENCOUNTER — TELEPHONE (OUTPATIENT)
Dept: FAMILY MEDICINE CLINIC | Age: 87
End: 2022-12-13

## 2022-12-13 DIAGNOSIS — R53.83 OTHER FATIGUE: ICD-10-CM

## 2022-12-13 DIAGNOSIS — R68.89 BLOOD PRESSURE ALTERATION: ICD-10-CM

## 2022-12-13 RX ORDER — TRAMADOL HYDROCHLORIDE 50 MG/1
100 TABLET ORAL 3 TIMES DAILY
Qty: 180 TABLET | Refills: 0 | Status: SHIPPED | OUTPATIENT
Start: 2022-12-13 | End: 2023-01-12

## 2022-12-13 NOTE — TELEPHONE ENCOUNTER
----- Message from Tierra Lara MD sent at 12/13/2022  7:08 AM EST -----  Please let her know that her blood work and urine was basically unremarkable except mildly increased template sugar which was not fasting. Awaiting for the stool studies. May stop antibiotics at the urine did not show a urine infection.

## 2022-12-14 ENCOUNTER — TELEPHONE (OUTPATIENT)
Dept: FAMILY MEDICINE CLINIC | Age: 87
End: 2022-12-14

## 2022-12-14 NOTE — H&P
BRIEF H&P//fENDOSCOPY PREPROCEDURE REPORT      Patient: Davey Henry                  : 1931                    Acct#: [de-identified]     Date: 2022  Indications/Brief History: Dysphagia  Anticipated Procedure: EGD with dil esophagus     ASA class:  2  Airway Adequate? Yes__x___   No_______     Preprocedure Exam:   Neuro: Alert, oriented. Heart:  Regular rate and rhythm   Lungs: Clear to ausculation bilaterally, no evidence respiratory distress  Abdomen:  soft.   NT     PLAN:  EGD__x___   COLONOSCOPY ______     ERCP________     Missy Garvey MD MD  2022, 1:26 PM

## 2022-12-14 NOTE — TELEPHONE ENCOUNTER
Nicola Swanson, daughter called said that her mom is trying to get a stool sample and she is \"working herself into a panic attack about it\". Smooth Smith is wondering if it is absolutely necessary to get the stool sample?     Smooth Smith can be reached at [phone] Dalotn Haney is on [facility]

## 2023-01-24 ENCOUNTER — OFFICE VISIT (OUTPATIENT)
Dept: FAMILY MEDICINE CLINIC | Age: 88
End: 2023-01-24

## 2023-01-24 VITALS
BODY MASS INDEX: 21.32 KG/M2 | WEIGHT: 108.6 LBS | RESPIRATION RATE: 14 BRPM | DIASTOLIC BLOOD PRESSURE: 64 MMHG | HEART RATE: 62 BPM | HEIGHT: 60 IN | SYSTOLIC BLOOD PRESSURE: 134 MMHG

## 2023-01-24 DIAGNOSIS — F33.41 RECURRENT MAJOR DEPRESSIVE DISORDER, IN PARTIAL REMISSION (HCC): ICD-10-CM

## 2023-01-24 DIAGNOSIS — H35.3220 EXUDATIVE AGE-RELATED MACULAR DEGENERATION OF LEFT EYE, UNSPECIFIED STAGE (HCC): ICD-10-CM

## 2023-01-24 DIAGNOSIS — G89.4 CHRONIC PAIN SYNDROME: ICD-10-CM

## 2023-01-24 DIAGNOSIS — N18.31 STAGE 3A CHRONIC KIDNEY DISEASE (HCC): ICD-10-CM

## 2023-01-24 DIAGNOSIS — M46.1 SI (SACROILIAC) JOINT INFLAMMATION (HCC): ICD-10-CM

## 2023-01-24 DIAGNOSIS — K21.9 GASTROESOPHAGEAL REFLUX DISEASE WITHOUT ESOPHAGITIS: Primary | ICD-10-CM

## 2023-01-24 DIAGNOSIS — M54.50 CHRONIC MIDLINE LOW BACK PAIN WITHOUT SCIATICA: ICD-10-CM

## 2023-01-24 DIAGNOSIS — F41.9 ANXIETY: ICD-10-CM

## 2023-01-24 DIAGNOSIS — E78.00 PURE HYPERCHOLESTEROLEMIA: ICD-10-CM

## 2023-01-24 DIAGNOSIS — G89.29 CHRONIC MIDLINE LOW BACK PAIN WITHOUT SCIATICA: ICD-10-CM

## 2023-01-24 DIAGNOSIS — N31.9 NEUROGENIC BLADDER: ICD-10-CM

## 2023-01-24 PROCEDURE — G8427 DOCREV CUR MEDS BY ELIG CLIN: HCPCS | Performed by: FAMILY MEDICINE

## 2023-01-24 PROCEDURE — 1090F PRES/ABSN URINE INCON ASSESS: CPT | Performed by: FAMILY MEDICINE

## 2023-01-24 PROCEDURE — G8420 CALC BMI NORM PARAMETERS: HCPCS | Performed by: FAMILY MEDICINE

## 2023-01-24 PROCEDURE — 1123F ACP DISCUSS/DSCN MKR DOCD: CPT | Performed by: FAMILY MEDICINE

## 2023-01-24 PROCEDURE — 1036F TOBACCO NON-USER: CPT | Performed by: FAMILY MEDICINE

## 2023-01-24 PROCEDURE — 99213 OFFICE O/P EST LOW 20 MIN: CPT | Performed by: FAMILY MEDICINE

## 2023-01-24 RX ORDER — LORAZEPAM 0.5 MG/1
TABLET ORAL
Qty: 120 TABLET | Refills: 0 | Status: SHIPPED | OUTPATIENT
Start: 2023-01-24 | End: 2023-03-17

## 2023-01-24 RX ORDER — TRAMADOL HYDROCHLORIDE 50 MG/1
100 TABLET ORAL EVERY 8 HOURS PRN
Qty: 180 TABLET | Refills: 0 | Status: SHIPPED | OUTPATIENT
Start: 2023-01-24 | End: 2023-02-23

## 2023-01-24 ASSESSMENT — PATIENT HEALTH QUESTIONNAIRE - PHQ9
9. THOUGHTS THAT YOU WOULD BE BETTER OFF DEAD, OR OF HURTING YOURSELF: 0
SUM OF ALL RESPONSES TO PHQ9 QUESTIONS 1 & 2: 0
10. IF YOU CHECKED OFF ANY PROBLEMS, HOW DIFFICULT HAVE THESE PROBLEMS MADE IT FOR YOU TO DO YOUR WORK, TAKE CARE OF THINGS AT HOME, OR GET ALONG WITH OTHER PEOPLE: 0
8. MOVING OR SPEAKING SO SLOWLY THAT OTHER PEOPLE COULD HAVE NOTICED. OR THE OPPOSITE, BEING SO FIGETY OR RESTLESS THAT YOU HAVE BEEN MOVING AROUND A LOT MORE THAN USUAL: 0
5. POOR APPETITE OR OVEREATING: 0
6. FEELING BAD ABOUT YOURSELF - OR THAT YOU ARE A FAILURE OR HAVE LET YOURSELF OR YOUR FAMILY DOWN: 0
2. FEELING DOWN, DEPRESSED OR HOPELESS: 0
3. TROUBLE FALLING OR STAYING ASLEEP: 1
SUM OF ALL RESPONSES TO PHQ QUESTIONS 1-9: 1
SUM OF ALL RESPONSES TO PHQ QUESTIONS 1-9: 1
7. TROUBLE CONCENTRATING ON THINGS, SUCH AS READING THE NEWSPAPER OR WATCHING TELEVISION: 0
1. LITTLE INTEREST OR PLEASURE IN DOING THINGS: 0
SUM OF ALL RESPONSES TO PHQ QUESTIONS 1-9: 1
SUM OF ALL RESPONSES TO PHQ QUESTIONS 1-9: 1
4. FEELING TIRED OR HAVING LITTLE ENERGY: 0

## 2023-01-24 ASSESSMENT — ENCOUNTER SYMPTOMS
ABDOMINAL PAIN: 0
COUGH: 0
NAUSEA: 0
SORE THROAT: 0
CONSTIPATION: 0
EYE PAIN: 0
WHEEZING: 0
ORTHOPNEA: 0
CHEST TIGHTNESS: 0
SHORTNESS OF BREATH: 0

## 2023-01-24 NOTE — PROGRESS NOTES
Subjective:      Patient ID: Brooklyn Watkins is a 80 y.o. female. Lipids     up    but  no  statin  with [pain     Back  stable          Depression  and anxiety       Hernia  noted    incisional   no pain     Anxiety  Patient reports no chest pain, dizziness, nausea, nervous/anxious behavior, palpitations or shortness of breath. Hypertension  This is a chronic problem. The current episode started more than 1 year ago. The problem has been resolved since onset. The problem is controlled. Associated symptoms include anxiety. Pertinent negatives include no chest pain, headaches, neck pain, orthopnea, palpitations, peripheral edema or shortness of breath. The current treatment provides significant improvement. There are no compliance problems. Past Medical History:   Diagnosis Date    Acute blood loss as cause of postoperative anemia 12/2/2017    [facility]     Dr[facility] Telma Mack    Depression     Gastric ulcer 11/2017     [facility]     GERD (gastroesophageal reflux disease)     PUD 1998 Dr. Mcclain Began    Hyperlipidemia     Hypertension 12/3/2017    Hypertension 12/3/2017    Hypothyroid     Incisional hernia of anterior abdominal wall without obstruction or gangrene     Neuropathy, peripheral     Osteoarthritis     Osteoporosis     Pneumoperitoneum 11/29/2017    Thyroid disease     hypothyroid    Vitamin D deficiency             Review of Systems   Constitutional:  Negative for appetite change, fatigue and fever. HENT:  Negative for congestion, ear pain, postnasal drip and sore throat. Eyes:  Negative for pain and visual disturbance. Respiratory:  Negative for cough, chest tightness, shortness of breath and wheezing. Cardiovascular:  Negative for chest pain, palpitations, orthopnea and leg swelling. Gastrointestinal:  Negative for abdominal pain, constipation and nausea. Genitourinary:  Negative for dysuria and frequency.    Musculoskeletal: Negative for arthralgias, joint swelling, neck pain and neck stiffness. Skin:  Negative for rash. Neurological:  Negative for dizziness, weakness, numbness and headaches. Hematological:  Negative for adenopathy. Does not bruise/bleed easily. Psychiatric/Behavioral:  Negative for behavioral problems and sleep disturbance. The patient is not nervous/anxious. /64 (Site: Right Upper Arm, Position: Sitting, Cuff Size: Medium Adult)   Pulse 62   Resp 14   Ht 5' (1.524 m)   Wt 108 lb 9.6 oz (49.3 kg)   BMI 21.21 kg/m²    Objective:   Physical Exam  Vitals and nursing note reviewed. Constitutional:       Appearance: She is well-developed. HENT:      Head: Normocephalic and atraumatic. Right Ear: External ear normal.      Left Ear: External ear normal.      Nose: Nose normal.   Eyes:      General: No scleral icterus. Conjunctiva/sclera: Conjunctivae normal.      Pupils: Pupils are equal, round, and reactive to light. Neck:      Thyroid: No thyromegaly. Vascular: No JVD. Cardiovascular:      Rate and Rhythm: Normal rate and regular rhythm. Heart sounds: Normal heart sounds. Pulmonary:      Effort: Pulmonary effort is normal.      Breath sounds: Normal breath sounds. No wheezing or rales. Abdominal:      General: Bowel sounds are normal. There is no distension. Palpations: Abdomen is soft. There is no mass. Tenderness: There is no abdominal tenderness. Hernia: A hernia (incisional  hernia) is present. Musculoskeletal:         General: No tenderness. Normal range of motion. Cervical back: Normal range of motion and neck supple. Lymphadenopathy:      Cervical: No cervical adenopathy. Skin:     General: Skin is warm and dry. Findings: No rash. Neurological:      Mental Status: She is alert and oriented to person, place, and time. Cranial Nerves: No cranial nerve deficit. Deep Tendon Reflexes: Reflexes are normal and symmetric. Assessment:        ICD-10-CM    1. Gastroesophageal reflux disease without esophagitis  K21.9       2. Chronic pain syndrome  G89.4 traMADol (ULTRAM) 50 MG tablet      3. SI (sacroiliac) joint inflammation (HCC)  M46.1 traMADol (ULTRAM) 50 MG tablet      4. Chronic midline low back pain without sciatica  M54.50 traMADol (ULTRAM) 50 MG tablet    G89.29       5. Anxiety  F41.9 LORazepam (ATIVAN) 0.5 MG tablet      6. Exudative age-related macular degeneration of left eye, unspecified stage ([facility] [address]  H35.3220       7. Recurrent major depressive disorder, in partial remission ([facility] [address]  F33.41       8. Stage 3a chronic kidney disease (HCC)  N18.31       9. Neurogenic bladder  N31.9       10. Pure hypercholesterolemia  E78.00              Plan:      Current Outpatient Medications   Medication Sig Dispense Refill    traMADol (ULTRAM) 50 MG tablet Take 2 tablets by mouth every 8 hours as needed for Pain for up to 30 days. 180 tablet 0    LORazepam (ATIVAN) 0.5 MG tablet take 1 tablet by mouth every 6 hours if needed for anxiety 120 tablet 0    gabapentin (NEURONTIN) 300 MG capsule take 1 capsule by mouth NIGHTLY 90 capsule 1    pantoprazole (PROTONIX) 40 MG tablet take 1 tablet by mouth twice a day 30 MINUTES PRIOR TO BREAKFAST AND DINNER 180 tablet 1    mirabegron (MYRBETRIQ) 50 MG TB24 take 1 tablet by mouth once daily 90 tablet 1    levothyroxine (SYNTHROID) 50 MCG tablet take 1 tablet by mouth once daily 90 tablet 1    PARoxetine (PAXIL) 10 MG tablet Take 1 tablet by mouth daily 90 tablet 1    ipratropium (ATROVENT) 0.03 % nasal spray 2 sprays by Each Nostril route every 12 hours 30 mL 5    Multiple Vitamins-Minerals (PRESERVISION/LUTEIN) CAPS Take 1 capsule by mouth 2 times daily. No current facility-administered medications for this visit. No orders of the defined types were placed in this encounter.          See in  3 mths      Stop  fasting and   glucose  and  hgba1c  Rambo Wyatt MD

## 2023-02-26 DIAGNOSIS — F41.9 ANXIETY: ICD-10-CM

## 2023-02-26 DIAGNOSIS — E03.4 HYPOTHYROIDISM DUE TO ACQUIRED ATROPHY OF THYROID: ICD-10-CM

## 2023-02-26 DIAGNOSIS — F33.41 RECURRENT MAJOR DEPRESSIVE DISORDER, IN PARTIAL REMISSION (HCC): ICD-10-CM

## 2023-02-27 NOTE — TELEPHONE ENCOUNTER
Date of last visit:  1/24/2023  Date of next visit:  4/24/2023    Requested Prescriptions     Pending Prescriptions Disp Refills    PARoxetine (PAXIL) 10 MG tablet [Pharmacy Med Name: PAROXETINE HCL 10 MG TABLET] 90 tablet 1     Sig: take 1 tablet by mouth once daily    levothyroxine (SYNTHROID) 50 MCG tablet [Pharmacy Med Name: LEVOTHYROXINE 50 MCG TABLET] 90 tablet 1     Sig: take 1 tablet by mouth once daily

## 2023-02-28 RX ORDER — LEVOTHYROXINE SODIUM 0.05 MG/1
TABLET ORAL
Qty: 90 TABLET | Refills: 1 | Status: SHIPPED | OUTPATIENT
Start: 2023-02-28

## 2023-02-28 RX ORDER — PAROXETINE 10 MG/1
TABLET, FILM COATED ORAL
Qty: 90 TABLET | Refills: 1 | Status: SHIPPED | OUTPATIENT
Start: 2023-02-28

## 2023-03-17 DIAGNOSIS — G89.29 CHRONIC MIDLINE LOW BACK PAIN WITHOUT SCIATICA: ICD-10-CM

## 2023-03-17 DIAGNOSIS — M54.50 CHRONIC MIDLINE LOW BACK PAIN WITHOUT SCIATICA: ICD-10-CM

## 2023-03-17 DIAGNOSIS — G89.4 CHRONIC PAIN SYNDROME: ICD-10-CM

## 2023-03-17 DIAGNOSIS — M46.1 SI (SACROILIAC) JOINT INFLAMMATION (HCC): ICD-10-CM

## 2023-03-17 RX ORDER — TRAMADOL HYDROCHLORIDE 50 MG/1
TABLET ORAL
Qty: 180 TABLET | OUTPATIENT
Start: 2023-03-17

## 2023-03-17 NOTE — TELEPHONE ENCOUNTER
Date of last visit:  1/24/2023  Date of next visit:  4/24/2023    Requested Prescriptions     Pending Prescriptions Disp Refills    traMADol (ULTRAM) 50 MG tablet 180 tablet 0     Sig: Take 2 tablets by mouth every 8 hours as needed for Pain for up to 30 days.

## 2023-03-18 RX ORDER — TRAMADOL HYDROCHLORIDE 50 MG/1
100 TABLET ORAL EVERY 8 HOURS PRN
Qty: 180 TABLET | Refills: 0 | Status: SHIPPED | OUTPATIENT
Start: 2023-03-18 | End: 2023-04-17

## 2023-03-27 DIAGNOSIS — N31.9 NEUROGENIC BLADDER: ICD-10-CM

## 2023-03-27 NOTE — TELEPHONE ENCOUNTER
Date of last visit:  1/24/2023  Date of next visit:  4/24/2023    Requested Prescriptions     Pending Prescriptions Disp Refills    mirabegron (MYRBETRIQ) 50 MG TB24 90 tablet 1     Sig: take 1 tablet by mouth once daily

## 2023-03-27 NOTE — TELEPHONE ENCOUNTER
Pt called to re an refill on the following    mirabegron (MYRBETRIQ) 50 MG TB24    Please send to [facility]

## 2023-04-05 DIAGNOSIS — F41.9 ANXIETY: ICD-10-CM

## 2023-04-05 NOTE — TELEPHONE ENCOUNTER
Date of last visit:  1/24/2023  Date of next visit:  4/24/2023    Requested Prescriptions     Pending Prescriptions Disp Refills    LORazepam (ATIVAN) 0.5 MG tablet 120 tablet 0     Sig: take 1 tablet by mouth every 6 hours if needed for anxiety

## 2023-04-06 RX ORDER — LORAZEPAM 0.5 MG/1
TABLET ORAL
Qty: 120 TABLET | Refills: 0 | Status: SHIPPED | OUTPATIENT
Start: 2023-04-06 | End: 2023-05-27

## 2023-04-18 ENCOUNTER — HOSPITAL ENCOUNTER (OUTPATIENT)
Age: 88
Discharge: HOME | End: 2023-04-18
Payer: MEDICARE

## 2023-04-18 ENCOUNTER — HOSPITAL ENCOUNTER (OUTPATIENT)
Dept: GENERAL RADIOLOGY | Age: 88
Discharge: HOME | End: 2023-04-18
Payer: MEDICARE

## 2023-04-18 DIAGNOSIS — K59.00 CONSTIPATION, UNSPECIFIED CONSTIPATION TYPE: ICD-10-CM

## 2023-04-18 PROCEDURE — 74018 RADEX ABDOMEN 1 VIEW: CPT

## 2023-04-21 NOTE — TELEPHONE ENCOUNTER
Date of last visit:  1/24/2023  Date of next visit:  4/24/2023    Requested Prescriptions     Pending Prescriptions Disp Refills    pantoprazole (PROTONIX) 40 MG tablet [Pharmacy Med Name: PANTOPRAZOLE SOD DR 40 MG TAB] 180 tablet 1     Sig: take 1 tablet by mouth twice a day 30 MINUTES PRIOR TO BREAKFAST AND DINNER

## 2023-04-22 RX ORDER — PANTOPRAZOLE SODIUM 40 MG/1
TABLET, DELAYED RELEASE ORAL
Qty: 180 TABLET | Refills: 1 | Status: SHIPPED | OUTPATIENT
Start: 2023-04-22

## 2023-04-24 ENCOUNTER — OFFICE VISIT (OUTPATIENT)
Dept: FAMILY MEDICINE CLINIC | Age: 88
End: 2023-04-24

## 2023-04-24 VITALS
HEIGHT: 60 IN | WEIGHT: 111 LBS | DIASTOLIC BLOOD PRESSURE: 72 MMHG | SYSTOLIC BLOOD PRESSURE: 128 MMHG | RESPIRATION RATE: 12 BRPM | HEART RATE: 78 BPM | BODY MASS INDEX: 21.79 KG/M2

## 2023-04-24 DIAGNOSIS — K21.9 GASTROESOPHAGEAL REFLUX DISEASE WITHOUT ESOPHAGITIS: ICD-10-CM

## 2023-04-24 DIAGNOSIS — E78.00 PURE HYPERCHOLESTEROLEMIA: ICD-10-CM

## 2023-04-24 DIAGNOSIS — M46.1 SI (SACROILIAC) JOINT INFLAMMATION (HCC): ICD-10-CM

## 2023-04-24 DIAGNOSIS — N18.31 STAGE 3A CHRONIC KIDNEY DISEASE (HCC): ICD-10-CM

## 2023-04-24 DIAGNOSIS — H35.3220 EXUDATIVE AGE-RELATED MACULAR DEGENERATION OF LEFT EYE, UNSPECIFIED STAGE (HCC): ICD-10-CM

## 2023-04-24 DIAGNOSIS — F33.41 RECURRENT MAJOR DEPRESSIVE DISORDER, IN PARTIAL REMISSION (HCC): ICD-10-CM

## 2023-04-24 DIAGNOSIS — M81.0 AGE-RELATED OSTEOPOROSIS WITHOUT CURRENT PATHOLOGICAL FRACTURE: ICD-10-CM

## 2023-04-24 DIAGNOSIS — N31.9 NEUROGENIC BLADDER: ICD-10-CM

## 2023-04-24 DIAGNOSIS — F41.9 ANXIETY: ICD-10-CM

## 2023-04-24 DIAGNOSIS — M54.50 CHRONIC MIDLINE LOW BACK PAIN WITHOUT SCIATICA: ICD-10-CM

## 2023-04-24 DIAGNOSIS — G60.9 IDIOPATHIC PERIPHERAL NEUROPATHY: ICD-10-CM

## 2023-04-24 DIAGNOSIS — Z00.00 MEDICARE ANNUAL WELLNESS VISIT, SUBSEQUENT: Primary | ICD-10-CM

## 2023-04-24 DIAGNOSIS — G89.29 CHRONIC MIDLINE LOW BACK PAIN WITHOUT SCIATICA: ICD-10-CM

## 2023-04-24 DIAGNOSIS — M19.042 PRIMARY OSTEOARTHRITIS OF BOTH HANDS: ICD-10-CM

## 2023-04-24 DIAGNOSIS — E03.4 HYPOTHYROIDISM DUE TO ACQUIRED ATROPHY OF THYROID: ICD-10-CM

## 2023-04-24 DIAGNOSIS — M19.041 PRIMARY OSTEOARTHRITIS OF BOTH HANDS: ICD-10-CM

## 2023-04-24 PROCEDURE — 99213 OFFICE O/P EST LOW 20 MIN: CPT | Performed by: FAMILY MEDICINE

## 2023-04-24 PROCEDURE — G0439 PPPS, SUBSEQ VISIT: HCPCS | Performed by: FAMILY MEDICINE

## 2023-04-24 PROCEDURE — 1123F ACP DISCUSS/DSCN MKR DOCD: CPT | Performed by: FAMILY MEDICINE

## 2023-04-24 RX ORDER — PAROXETINE 10 MG/1
10 TABLET, FILM COATED ORAL DAILY
Qty: 90 TABLET | Refills: 1 | Status: SHIPPED | OUTPATIENT
Start: 2023-04-24

## 2023-04-24 SDOH — ECONOMIC STABILITY: FOOD INSECURITY: WITHIN THE PAST 12 MONTHS, THE FOOD YOU BOUGHT JUST DIDN'T LAST AND YOU DIDN'T HAVE MONEY TO GET MORE.: NEVER TRUE

## 2023-04-24 SDOH — ECONOMIC STABILITY: FOOD INSECURITY: WITHIN THE PAST 12 MONTHS, YOU WORRIED THAT YOUR FOOD WOULD RUN OUT BEFORE YOU GOT MONEY TO BUY MORE.: NEVER TRUE

## 2023-04-24 SDOH — ECONOMIC STABILITY: HOUSING INSECURITY
IN THE LAST 12 MONTHS, WAS THERE A TIME WHEN YOU DID NOT HAVE A STEADY PLACE TO SLEEP OR SLEPT IN A SHELTER (INCLUDING NOW)?: NO

## 2023-04-24 SDOH — ECONOMIC STABILITY: INCOME INSECURITY: HOW HARD IS IT FOR YOU TO PAY FOR THE VERY BASICS LIKE FOOD, HOUSING, MEDICAL CARE, AND HEATING?: NOT HARD AT ALL

## 2023-04-24 ASSESSMENT — PATIENT HEALTH QUESTIONNAIRE - PHQ9
5. POOR APPETITE OR OVEREATING: 0
10. IF YOU CHECKED OFF ANY PROBLEMS, HOW DIFFICULT HAVE THESE PROBLEMS MADE IT FOR YOU TO DO YOUR WORK, TAKE CARE OF THINGS AT HOME, OR GET ALONG WITH OTHER PEOPLE: 0
SUM OF ALL RESPONSES TO PHQ9 QUESTIONS 1 & 2: 0
SUM OF ALL RESPONSES TO PHQ QUESTIONS 1-9: 0
SUM OF ALL RESPONSES TO PHQ QUESTIONS 1-9: 0
4. FEELING TIRED OR HAVING LITTLE ENERGY: 0
6. FEELING BAD ABOUT YOURSELF - OR THAT YOU ARE A FAILURE OR HAVE LET YOURSELF OR YOUR FAMILY DOWN: 0
2. FEELING DOWN, DEPRESSED OR HOPELESS: 0
SUM OF ALL RESPONSES TO PHQ QUESTIONS 1-9: 0
SUM OF ALL RESPONSES TO PHQ QUESTIONS 1-9: 0
9. THOUGHTS THAT YOU WOULD BE BETTER OFF DEAD, OR OF HURTING YOURSELF: 0
3. TROUBLE FALLING OR STAYING ASLEEP: 0
8. MOVING OR SPEAKING SO SLOWLY THAT OTHER PEOPLE COULD HAVE NOTICED. OR THE OPPOSITE, BEING SO FIGETY OR RESTLESS THAT YOU HAVE BEEN MOVING AROUND A LOT MORE THAN USUAL: 0
1. LITTLE INTEREST OR PLEASURE IN DOING THINGS: 0
7. TROUBLE CONCENTRATING ON THINGS, SUCH AS READING THE NEWSPAPER OR WATCHING TELEVISION: 0

## 2023-04-24 ASSESSMENT — ENCOUNTER SYMPTOMS
CHEST TIGHTNESS: 0
SHORTNESS OF BREATH: 0
CONSTIPATION: 0
COUGH: 0
BACK PAIN: 1
WHEEZING: 0
SORE THROAT: 0
ABDOMINAL PAIN: 0
EYE PAIN: 0
NAUSEA: 0

## 2023-04-24 ASSESSMENT — LIFESTYLE VARIABLES
HOW OFTEN DO YOU HAVE A DRINK CONTAINING ALCOHOL: NEVER
HOW MANY STANDARD DRINKS CONTAINING ALCOHOL DO YOU HAVE ON A TYPICAL DAY: PATIENT DOES NOT DRINK

## 2023-04-24 NOTE — PATIENT INSTRUCTIONS
[id number]               YQEKFBL Version: 13.6  © 5147-3303 [employer], [employer]. Care instructions adapted under license by [facility] ([facility]). If you have questions about a medical condition or this instruction, always ask your healthcare professional. [address] any warranty or liability for your use of this information. Advance Directives: Care Instructions  Overview  An advance directive is a legal way to state your wishes at the end of your life. It tells your family and your doctor what to do if you can't say what you want. There are two main types of advance directives. You can change them any time your wishes change. Living will. This form tells your family and your doctor your wishes about life support and other treatment. The form is also called a declaration. Medical power of . This form lets you name a person to make treatment decisions for you when you can't speak for yourself. This person is called a health care agent (health care proxy, health care surrogate). The form is also called a durable power of  for health care. If you do not have an advance directive, decisions about your medical care may be made by a family member, or by a doctor or a  who doesn't know you. It may help to think of an advance directive as a gift to the people who care for you. If you have one, they won't have to make tough decisions by themselves. For more information, including forms for your state, see the [address] [facility] website (www.caringinfo.org/planning/advance-directives/). Follow-up care is a key part of your treatment and safety. Be sure to make and go to all appointments, and call your doctor if you are having problems. It's also a good idea to know your test results and keep a list of the medicines you take. What should you include in an advance directive? Many states have a unique advance directive form.  (It may ask you to address specific issues.) Or you

## 2023-04-24 NOTE — PROGRESS NOTES
file   Social Connections: Not on file   Intimate Partner Violence: Not on file   Housing Stability: Unknown    Unable to Pay for Housing in the Last Year: Not on file    Number of Places Lived in the Last Year: Not on file    Unstable Housing in the Last Year: No     Family History   Problem Relation Age of Onset    Bleeding Prob Mother     Stroke Mother     Heart Attack Father     Heart Disease Father     Breast Cancer Daughter 62    Cancer Son         head/neck           Review of Systems   Constitutional:  Negative for appetite change, fatigue and fever. HENT:  Negative for congestion, ear pain, postnasal drip and sore throat. Eyes:  Negative for pain and visual disturbance. Respiratory:  Negative for cough, chest tightness, shortness of breath and wheezing. Cardiovascular:  Negative for chest pain, palpitations and leg swelling. Gastrointestinal:  Negative for abdominal pain, constipation and nausea. Genitourinary:  Negative for dysuria and frequency. Musculoskeletal:  Positive for back pain. Negative for arthralgias, joint swelling, neck pain and neck stiffness. Noted    Skin:  Negative for rash. Neurological:  Negative for dizziness, weakness, numbness and headaches. Hematological:  Negative for adenopathy. Does not bruise/bleed easily. Psychiatric/Behavioral:  Negative for behavioral problems and sleep disturbance. The patient is not nervous/anxious. /72 (Site: Right Upper Arm, Position: Sitting, Cuff Size: Medium Adult)   Pulse 78   Resp 12   Ht 5' (1.524 m)   Wt 111 lb (50.3 kg)   BMI 21.68 kg/m²    Objective   Physical Exam  Vitals and nursing note reviewed. Constitutional:       Appearance: She is well-developed. HENT:      Head: Normocephalic and atraumatic. Right Ear: External ear normal.      Left Ear: External ear normal.      Nose: Nose normal.   Eyes:      General: No scleral icterus.      Conjunctiva/sclera: Conjunctivae normal.      Pupils:

## 2023-05-01 ENCOUNTER — TELEPHONE (OUTPATIENT)
Dept: FAMILY MEDICINE CLINIC | Age: 88
End: 2023-05-01

## 2023-05-01 RX ORDER — CEFUROXIME AXETIL 250 MG/1
250 TABLET ORAL 2 TIMES DAILY
Qty: 20 TABLET | Refills: 0 | Status: SHIPPED | OUTPATIENT
Start: 2023-05-01 | End: 2023-05-11

## 2023-05-01 NOTE — TELEPHONE ENCOUNTER
Date of last visit:  4/24/2023  Date of next visit:  7/27/2023    Requested Prescriptions     Signed Prescriptions Disp Refills    cefUROXime (CEFTIN) 250 MG tablet 20 tablet 0     Sig: Take 1 tablet by mouth 2 times daily for 10 days     Authorizing Provider: Ashwin Watson     Ordering User: Branden Merino

## 2023-05-03 DIAGNOSIS — G89.29 CHRONIC MIDLINE LOW BACK PAIN WITHOUT SCIATICA: ICD-10-CM

## 2023-05-03 DIAGNOSIS — M46.1 SI (SACROILIAC) JOINT INFLAMMATION (HCC): ICD-10-CM

## 2023-05-03 DIAGNOSIS — M54.50 CHRONIC MIDLINE LOW BACK PAIN WITHOUT SCIATICA: ICD-10-CM

## 2023-05-03 DIAGNOSIS — G89.4 CHRONIC PAIN SYNDROME: ICD-10-CM

## 2023-05-03 NOTE — TELEPHONE ENCOUNTER
Date of last visit:  4/24/2023  Date of next visit:  7/27/2023    Requested Prescriptions     Pending Prescriptions Disp Refills    traMADol (ULTRAM) 50 MG tablet 180 tablet 0     Sig: Take 2 tablets by mouth every 8 hours as needed for Pain for up to 30 days.

## 2023-05-04 RX ORDER — TRAMADOL HYDROCHLORIDE 50 MG/1
100 TABLET ORAL EVERY 8 HOURS PRN
Qty: 180 TABLET | Refills: 0 | Status: SHIPPED | OUTPATIENT
Start: 2023-05-04 | End: 2023-06-03

## 2023-05-09 ENCOUNTER — TELEPHONE (OUTPATIENT)
Dept: FAMILY MEDICINE CLINIC | Age: 88
End: 2023-05-09

## 2023-05-09 RX ORDER — CIPROFLOXACIN 500 MG/1
500 TABLET, FILM COATED ORAL 2 TIMES DAILY
Qty: 20 TABLET | Refills: 0 | Status: SHIPPED | OUTPATIENT
Start: 2023-05-09 | End: 2023-05-19

## 2023-05-09 NOTE — TELEPHONE ENCOUNTER
Pt called to req an different atb she stated the atb she is on it not helping she feel's worse and still having the urgency cramping frequency back pain and burning pt is requesting we call her something else in    Please send to [facility] [address] [facility] [address] please call pt once addressed [phone]

## 2023-05-09 NOTE — TELEPHONE ENCOUNTER
Date of last visit:  4/24/2023  Date of next visit:  7/27/2023    Requested Prescriptions     Signed Prescriptions Disp Refills    ciprofloxacin (CIPRO) 500 MG tablet 20 tablet 0     Sig: Take 1 tablet by mouth 2 times daily for 10 days     Authorizing Provider: Claude Rua     Ordering User: Timothy Metzger informed by Phone.

## 2023-05-23 ENCOUNTER — TELEPHONE (OUTPATIENT)
Dept: FAMILY MEDICINE CLINIC | Age: 88
End: 2023-05-23

## 2023-05-23 NOTE — TELEPHONE ENCOUNTER
Per verbal order from Dr Barbara Jones: needs an appt in office. Emmy Zavala informed by Phone.  Appt scheduled for tomorrow 5- at 2:10pm.

## 2023-05-23 NOTE — TELEPHONE ENCOUNTER
Patient called stating that she is still experiencing urinary frequency, feels like she goes about every hour. No discomfort but is feeling weak. Finished the Cipro yesterday. Also had Ceftin which did not help as well.     Please call her back

## 2023-05-24 ENCOUNTER — OFFICE VISIT (OUTPATIENT)
Dept: FAMILY MEDICINE CLINIC | Age: 88
End: 2023-05-24

## 2023-05-24 VITALS
SYSTOLIC BLOOD PRESSURE: 128 MMHG | HEART RATE: 72 BPM | DIASTOLIC BLOOD PRESSURE: 70 MMHG | HEIGHT: 60 IN | WEIGHT: 110 LBS | RESPIRATION RATE: 16 BRPM | BODY MASS INDEX: 21.6 KG/M2

## 2023-05-24 DIAGNOSIS — F41.9 ANXIETY: ICD-10-CM

## 2023-05-24 DIAGNOSIS — G60.9 IDIOPATHIC PERIPHERAL NEUROPATHY: ICD-10-CM

## 2023-05-24 DIAGNOSIS — E78.00 PURE HYPERCHOLESTEROLEMIA: ICD-10-CM

## 2023-05-24 DIAGNOSIS — N18.31 CHRONIC RENAL FAILURE, STAGE 3A (HCC): ICD-10-CM

## 2023-05-24 DIAGNOSIS — N31.9 NEUROGENIC BLADDER: Primary | ICD-10-CM

## 2023-05-24 DIAGNOSIS — R35.0 FREQUENCY OF URINATION: ICD-10-CM

## 2023-05-24 DIAGNOSIS — K21.9 GASTROESOPHAGEAL REFLUX DISEASE WITHOUT ESOPHAGITIS: ICD-10-CM

## 2023-05-24 DIAGNOSIS — E03.4 HYPOTHYROIDISM DUE TO ACQUIRED ATROPHY OF THYROID: ICD-10-CM

## 2023-05-24 LAB
BACTERIA URINE, POC: ABNORMAL
BILIRUBIN URINE: 0 MG/DL
BLOOD, URINE: NEGATIVE
CASTS URINE, POC: ABNORMAL
CLARITY: ABNORMAL
COLOR: YELLOW
CRYSTALS URINE, POC: ABNORMAL
EPI CELLS URINE, POC: ABNORMAL
GLUCOSE URINE: NEGATIVE
KETONES, URINE: NEGATIVE
LEUKOCYTE EST, POC: NEGATIVE
NITRITE, URINE: NEGATIVE
PH UA: 9 (ref 4.5–8)
PROTEIN UA: POSITIVE
RBC URINE, POC: ABNORMAL
SPECIFIC GRAVITY UA: 1.01 (ref 1–1.03)
UROBILINOGEN, URINE: NORMAL
WBC URINE, POC: ABNORMAL
YEAST URINE, POC: ABNORMAL

## 2023-05-24 PROCEDURE — G8427 DOCREV CUR MEDS BY ELIG CLIN: HCPCS | Performed by: FAMILY MEDICINE

## 2023-05-24 PROCEDURE — 81000 URINALYSIS NONAUTO W/SCOPE: CPT | Performed by: FAMILY MEDICINE

## 2023-05-24 PROCEDURE — G8420 CALC BMI NORM PARAMETERS: HCPCS | Performed by: FAMILY MEDICINE

## 2023-05-24 PROCEDURE — 1090F PRES/ABSN URINE INCON ASSESS: CPT | Performed by: FAMILY MEDICINE

## 2023-05-24 PROCEDURE — 1123F ACP DISCUSS/DSCN MKR DOCD: CPT | Performed by: FAMILY MEDICINE

## 2023-05-24 PROCEDURE — 99213 OFFICE O/P EST LOW 20 MIN: CPT | Performed by: FAMILY MEDICINE

## 2023-05-24 PROCEDURE — 1036F TOBACCO NON-USER: CPT | Performed by: FAMILY MEDICINE

## 2023-05-24 RX ORDER — GABAPENTIN 300 MG/1
300 CAPSULE ORAL NIGHTLY
Qty: 90 CAPSULE | Refills: 1 | Status: SHIPPED | OUTPATIENT
Start: 2023-05-24 | End: 2023-11-20

## 2023-05-24 RX ORDER — LORAZEPAM 0.5 MG/1
TABLET ORAL
Qty: 120 TABLET | Refills: 0 | Status: SHIPPED | OUTPATIENT
Start: 2023-05-24 | End: 2023-07-14

## 2023-05-24 ASSESSMENT — ENCOUNTER SYMPTOMS
WHEEZING: 0
ABDOMINAL PAIN: 0
SHORTNESS OF BREATH: 0
EYE PAIN: 0
NAUSEA: 0
CHEST TIGHTNESS: 0
COUGH: 0
CONSTIPATION: 0
SORE THROAT: 0

## 2023-05-24 NOTE — PROGRESS NOTES
Subjective:      Patient ID: Sasha Holguin is a 80 y.o. female. One a t  night  only       Goes  during  day  every  hour       Back pain  stable  \\      Anxiety   ativan   pill   bid  to  tid    sone  7  up  mostly  caffeine     Urinary Frequency   This is a new problem. Episode onset: 4 weeks. The problem has been unchanged. The quality of the pain is described as aching. The pain is at a severity of 2/10. There has been no fever. She is Not sexually active. There is No history of pyelonephritis. Associated symptoms include frequency. Pertinent negatives include no discharge, flank pain, hematuria, nausea, sweats or urgency. Associated symptoms comments:    Some  urgency  with  frequency and  feels  weak  and no  temp       Body  aches       Drinks  water   but   coffee  and  tea   and  tj. Fatigue  This is a new problem. The current episode started more than 1 year ago. The problem occurs daily. The problem has been resolved. Associated symptoms include fatigue. Pertinent negatives include no abdominal pain, arthralgias, chest pain, congestion, coughing, fever, headaches, joint swelling, nausea, neck pain, numbness, rash, sore throat or weakness. Nothing aggravates the symptoms. Past Medical History:   Diagnosis Date    Acute blood loss as cause of postoperative anemia 12/2/2017    Alcohol abuse sober since Irinelodia Gums polyp     Dr. Katharina Rojas    Depression     Gastric ulcer 11/2017     nathanael     GERD (gastroesophageal reflux disease)     PUD 1998 Dr. Joanette Ganser    Hyperlipidemia     Hypertension 12/3/2017    Hypertension 12/3/2017    Hypothyroid     Incisional hernia of anterior abdominal wall without obstruction or gangrene     Neuropathy, peripheral     Osteoarthritis     Osteoporosis     Pneumoperitoneum 11/29/2017    Thyroid disease     hypothyroid    Vitamin D deficiency       Review of Systems   Constitutional:  Positive for fatigue. Negative for appetite change and fever.

## 2023-05-31 ENCOUNTER — NURSE ONLY (OUTPATIENT)
Dept: LAB | Age: 88
End: 2023-05-31

## 2023-05-31 DIAGNOSIS — E03.4 HYPOTHYROIDISM DUE TO ACQUIRED ATROPHY OF THYROID: ICD-10-CM

## 2023-05-31 DIAGNOSIS — E78.00 PURE HYPERCHOLESTEROLEMIA: ICD-10-CM

## 2023-05-31 LAB
CHOLEST SERPL-MCNC: 238 MG/DL (ref 100–199)
HDLC SERPL-MCNC: 88 MG/DL
LDLC SERPL CALC-MCNC: 132 MG/DL
T4 FREE SERPL-MCNC: 1.52 NG/DL (ref 0.93–1.76)
TRIGL SERPL-MCNC: 88 MG/DL (ref 0–199)
TSH SERPL DL<=0.005 MIU/L-ACNC: 2.22 UIU/ML (ref 0.4–4.2)

## 2023-06-01 ENCOUNTER — TELEPHONE (OUTPATIENT)
Dept: FAMILY MEDICINE CLINIC | Age: 88
End: 2023-06-01

## 2023-06-01 DIAGNOSIS — N18.31 STAGE 3A CHRONIC KIDNEY DISEASE (HCC): Primary | ICD-10-CM

## 2023-06-01 DIAGNOSIS — D64.9 ANEMIA, UNSPECIFIED TYPE: ICD-10-CM

## 2023-06-01 NOTE — TELEPHONE ENCOUNTER
Katerina Castro informed by Phone. She was questioning her Iron- she thought she was getting labs in regards to that? Please Advise?

## 2023-06-01 NOTE — TELEPHONE ENCOUNTER
----- Message from Enmanuel Castaneda MD sent at 6/1/2023  1:00 AM EDT -----  Cholesterol is now about 30 points better and at 238 and thyroid dose is correct dose as levels are good so continue on the same thyroid dose    Please call

## 2023-06-08 ENCOUNTER — NURSE ONLY (OUTPATIENT)
Dept: LAB | Age: 88
End: 2023-06-08

## 2023-06-08 DIAGNOSIS — D64.9 ANEMIA, UNSPECIFIED TYPE: ICD-10-CM

## 2023-06-08 DIAGNOSIS — N18.31 STAGE 3A CHRONIC KIDNEY DISEASE (HCC): ICD-10-CM

## 2023-06-08 LAB
ANION GAP SERPL CALC-SCNC: 13 MEQ/L (ref 8–16)
BASOPHILS ABSOLUTE: 0.1 THOU/MM3 (ref 0–0.1)
BASOPHILS NFR BLD AUTO: 1.2 %
BUN SERPL-MCNC: 19 MG/DL (ref 7–22)
CALCIUM SERPL-MCNC: 10.1 MG/DL (ref 8.5–10.5)
CHLORIDE SERPL-SCNC: 104 MEQ/L (ref 98–111)
CO2 SERPL-SCNC: 27 MEQ/L (ref 23–33)
CREAT SERPL-MCNC: 1.2 MG/DL (ref 0.4–1.2)
DEPRECATED RDW RBC AUTO: 48.6 FL (ref 35–45)
EOSINOPHIL NFR BLD AUTO: 2.8 %
EOSINOPHILS ABSOLUTE: 0.1 THOU/MM3 (ref 0–0.4)
ERYTHROCYTE [DISTWIDTH] IN BLOOD BY AUTOMATED COUNT: 13.7 % (ref 11.5–14.5)
FERRITIN SERPL IA-MCNC: 136 NG/ML (ref 10–291)
GFR SERPL CREATININE-BSD FRML MDRD: 43 ML/MIN/1.73M2
GLUCOSE SERPL-MCNC: 90 MG/DL (ref 70–108)
HCT VFR BLD AUTO: 33.6 % (ref 37–47)
HGB BLD-MCNC: 10.6 GM/DL (ref 12–16)
IMM GRANULOCYTES # BLD AUTO: 0.01 THOU/MM3 (ref 0–0.07)
IMM GRANULOCYTES NFR BLD AUTO: 0.2 %
LYMPHOCYTES ABSOLUTE: 1.1 THOU/MM3 (ref 1–4.8)
LYMPHOCYTES NFR BLD AUTO: 24.8 %
MCH RBC QN AUTO: 30.6 PG (ref 26–33)
MCHC RBC AUTO-ENTMCNC: 31.5 GM/DL (ref 32.2–35.5)
MCV RBC AUTO: 97.1 FL (ref 81–99)
MONOCYTES ABSOLUTE: 0.3 THOU/MM3 (ref 0.4–1.3)
MONOCYTES NFR BLD AUTO: 7 %
NEUTROPHILS NFR BLD AUTO: 64 %
NRBC BLD AUTO-RTO: 0 /100 WBC
PLATELET # BLD AUTO: 207 THOU/MM3 (ref 130–400)
PMV BLD AUTO: 10.6 FL (ref 9.4–12.4)
POTASSIUM SERPL-SCNC: 4.6 MEQ/L (ref 3.5–5.2)
RBC # BLD AUTO: 3.46 MILL/MM3 (ref 4.2–5.4)
SEGMENTED NEUTROPHILS ABSOLUTE COUNT: 2.8 THOU/MM3 (ref 1.8–7.7)
SODIUM SERPL-SCNC: 144 MEQ/L (ref 135–145)
WBC # BLD AUTO: 4.3 THOU/MM3 (ref 4.8–10.8)

## 2023-06-19 ENCOUNTER — HOSPITAL ENCOUNTER (EMERGENCY)
Age: 88
Discharge: HOME | End: 2023-06-19
Payer: MEDICARE

## 2023-06-19 VITALS
TEMPERATURE: 97.5 F | OXYGEN SATURATION: 96 % | RESPIRATION RATE: 16 BRPM | HEART RATE: 89 BPM | SYSTOLIC BLOOD PRESSURE: 170 MMHG | DIASTOLIC BLOOD PRESSURE: 85 MMHG

## 2023-06-19 DIAGNOSIS — S51.812A SKIN TEAR OF LEFT FOREARM WITHOUT COMPLICATION, INITIAL ENCOUNTER: Primary | ICD-10-CM

## 2023-06-19 PROCEDURE — 99203 OFFICE O/P NEW LOW 30 MIN: CPT | Performed by: NURSE PRACTITIONER

## 2023-06-19 PROCEDURE — 99213 OFFICE O/P EST LOW 20 MIN: CPT

## 2023-06-19 ASSESSMENT — ENCOUNTER SYMPTOMS
DIARRHEA: 0
CONSTIPATION: 0
NAUSEA: 0
BLOOD IN STOOL: 0
WHEEZING: 0
EYE PAIN: 0
RHINORRHEA: 0
VOMITING: 0
SHORTNESS OF BREATH: 0
COUGH: 0
ABDOMINAL PAIN: 0

## 2023-06-19 NOTE — ED PROVIDER NOTES
[id number]       Chief Complaint   Patient presents with    Other     Skin tear        Nurses Notes reviewed and I agree except as noted in the HPI. HISTORY OF PRESENT ILLNESS   Rosa Crandall is a 80 y.o. female who presents to urgent care with complaint of skin tear on the left forearm that occurred 2 days ago. The wound was cleansed and patient has been putting triple antibiotic ointment on it. She denies other injuries including hitting her head. She denies other symptoms including chest pain, shortness of breath, fever or drainage from the wound. REVIEW OF SYSTEMS     Review of Systems   Constitutional:  Negative for appetite change, chills, fatigue, fever and unexpected weight change. HENT:  Negative for ear pain and rhinorrhea. Eyes:  Negative for pain and visual disturbance. Respiratory:  Negative for cough, shortness of breath and wheezing. Cardiovascular:  Negative for chest pain, palpitations and leg swelling. Gastrointestinal:  Negative for abdominal pain, blood in stool, constipation, diarrhea, nausea and vomiting. Genitourinary:  Negative for dysuria, frequency and hematuria. Musculoskeletal:  Negative for arthralgias, joint swelling and neck stiffness. Skin:  Positive for wound. Negative for rash. Neurological:  Negative for dizziness, syncope, weakness, light-headedness and headaches. Hematological:  Does not bruise/bleed easily.      PAST MEDICAL HISTORY         Diagnosis Date    Acute blood loss as cause of postoperative anemia 12/2/2017    Alcohol abuse sober since Lia Velázquez    Depression     Gastric ulcer 11/2017     nathanael     GERD (gastroesophageal reflux disease)     PUD 1998 Dr. Chanel Lowery    Hyperlipidemia     Hypertension 12/3/2017    Hypertension 12/3/2017    Hypothyroid     Incisional hernia of anterior abdominal wall without obstruction or gangrene

## 2023-06-19 NOTE — ED NOTES
To [facility] with complaints of skin tear to left forearm. States she hit it on a door jam on Saturday evening.  No bleeding     Sanjuanita Wheeler RN  06/19/23 [id number]

## 2023-06-20 DIAGNOSIS — M54.50 CHRONIC MIDLINE LOW BACK PAIN WITHOUT SCIATICA: ICD-10-CM

## 2023-06-20 DIAGNOSIS — M46.1 SI (SACROILIAC) JOINT INFLAMMATION (HCC): ICD-10-CM

## 2023-06-20 DIAGNOSIS — G89.4 CHRONIC PAIN SYNDROME: ICD-10-CM

## 2023-06-20 DIAGNOSIS — G89.29 CHRONIC MIDLINE LOW BACK PAIN WITHOUT SCIATICA: ICD-10-CM

## 2023-06-20 NOTE — TELEPHONE ENCOUNTER
Date of last visit:  5/24/2023  Date of next visit:  7/27/2023    Requested Prescriptions     Pending Prescriptions Disp Refills    traMADol (ULTRAM) 50 MG tablet 180 tablet 0     Sig: Take 2 tablets by mouth every 8 hours as needed for Pain for up to 30 days.

## 2023-06-21 RX ORDER — TRAMADOL HYDROCHLORIDE 50 MG/1
100 TABLET ORAL EVERY 8 HOURS PRN
Qty: 180 TABLET | Refills: 0 | Status: SHIPPED | OUTPATIENT
Start: 2023-06-21 | End: 2023-07-21

## 2023-07-13 DIAGNOSIS — E03.4 HYPOTHYROIDISM DUE TO ACQUIRED ATROPHY OF THYROID: ICD-10-CM

## 2023-07-13 RX ORDER — LEVOTHYROXINE SODIUM 0.05 MG/1
TABLET ORAL
Qty: 30 TABLET | Refills: 0 | Status: SHIPPED | OUTPATIENT
Start: 2023-07-13 | End: 2023-09-12

## 2023-07-13 NOTE — TELEPHONE ENCOUNTER
The pharmacy is requesting a refill of the below medication which has been pended for you:     Requested Prescriptions     Pending Prescriptions Disp Refills    levothyroxine (SYNTHROID) 50 MCG tablet [Pharmacy Med Name: LEVOTHYROXINE 50 MCG TABLET] 90 tablet 0     Sig: take 1 tablet by mouth once daily       Last Appointment Date: 5/24/2023  Next Appointment Date: 7/27/2023    Allergies   Allergen Reactions    Amoxicillin-Pot Clavulanate Diarrhea    Macrobid [Nitrofurantoin]     Statins Support Therapy      Unaware of allery    Sulfa Antibiotics Nausea Only

## 2023-07-17 DIAGNOSIS — F41.9 ANXIETY: ICD-10-CM

## 2023-07-17 NOTE — TELEPHONE ENCOUNTER
Date of last visit:  5/24/2023  Date of next visit:  7/27/2023    Requested Prescriptions     Pending Prescriptions Disp Refills    LORazepam (ATIVAN) 0.5 MG tablet 120 tablet 0     Sig: take 1 tablet by mouth every 6 hours if needed for anxiety

## 2023-07-18 RX ORDER — LORAZEPAM 0.5 MG/1
TABLET ORAL
Qty: 120 TABLET | Refills: 0 | Status: SHIPPED | OUTPATIENT
Start: 2023-07-18 | End: 2023-09-06

## 2023-08-09 DIAGNOSIS — G89.4 CHRONIC PAIN SYNDROME: ICD-10-CM

## 2023-08-09 DIAGNOSIS — G89.29 CHRONIC MIDLINE LOW BACK PAIN WITHOUT SCIATICA: ICD-10-CM

## 2023-08-09 DIAGNOSIS — M46.1 SI (SACROILIAC) JOINT INFLAMMATION (HCC): ICD-10-CM

## 2023-08-09 DIAGNOSIS — M54.50 CHRONIC MIDLINE LOW BACK PAIN WITHOUT SCIATICA: ICD-10-CM

## 2023-08-09 NOTE — TELEPHONE ENCOUNTER
Pt called to req an refill on the following    traMADol (ULTRAM) 50 MG tablet  Take 2 tablets by mouth every 8 hours as needed for Pain    Please send to [address]

## 2023-08-10 RX ORDER — TRAMADOL HYDROCHLORIDE 50 MG/1
100 TABLET ORAL EVERY 8 HOURS PRN
Qty: 180 TABLET | Refills: 0 | Status: SHIPPED | OUTPATIENT
Start: 2023-08-10 | End: 2023-09-09

## 2023-09-12 DIAGNOSIS — F41.9 ANXIETY: ICD-10-CM

## 2023-09-12 DIAGNOSIS — E03.4 HYPOTHYROIDISM DUE TO ACQUIRED ATROPHY OF THYROID: ICD-10-CM

## 2023-09-12 RX ORDER — LEVOTHYROXINE SODIUM 0.05 MG/1
TABLET ORAL
Qty: 90 TABLET | Refills: 0 | Status: SHIPPED | OUTPATIENT
Start: 2023-09-12

## 2023-09-12 NOTE — TELEPHONE ENCOUNTER
Date of last visit:  Visit date not found  Date of next visit:  9/14/2023    Requested Prescriptions     Pending Prescriptions Disp Refills    levothyroxine (SYNTHROID) 50 MCG tablet [Pharmacy Med Name: LEVOTHYROXINE 50 MCG TABLET] 30 tablet 0     Sig: take 1 tablet by mouth once daily

## 2023-09-12 NOTE — TELEPHONE ENCOUNTER
Date of last visit:  5/24/2023  Date of next visit:  9/14/2023    Requested Prescriptions     Pending Prescriptions Disp Refills    LORazepam (ATIVAN) 0.5 MG tablet [Pharmacy Med Name: LORAZEPAM 0.5 MG TABLET] 120 tablet      Sig: take 1 tablet by mouth every 6 hours if needed for anxiety

## 2023-09-13 RX ORDER — LORAZEPAM 0.5 MG/1
TABLET ORAL
Qty: 120 TABLET | Refills: 1 | Status: SHIPPED | OUTPATIENT
Start: 2023-09-13 | End: 2023-11-01

## 2023-09-14 ENCOUNTER — OFFICE VISIT (OUTPATIENT)
Dept: FAMILY MEDICINE CLINIC | Age: 88
End: 2023-09-14

## 2023-09-14 VITALS
HEIGHT: 60 IN | WEIGHT: 109.5 LBS | DIASTOLIC BLOOD PRESSURE: 68 MMHG | BODY MASS INDEX: 21.5 KG/M2 | RESPIRATION RATE: 16 BRPM | SYSTOLIC BLOOD PRESSURE: 114 MMHG | HEART RATE: 76 BPM

## 2023-09-14 DIAGNOSIS — F33.41 RECURRENT MAJOR DEPRESSIVE DISORDER, IN PARTIAL REMISSION (HCC): ICD-10-CM

## 2023-09-14 DIAGNOSIS — E78.00 PURE HYPERCHOLESTEROLEMIA: ICD-10-CM

## 2023-09-14 DIAGNOSIS — K21.9 GASTROESOPHAGEAL REFLUX DISEASE WITHOUT ESOPHAGITIS: ICD-10-CM

## 2023-09-14 DIAGNOSIS — E03.4 HYPOTHYROIDISM DUE TO ACQUIRED ATROPHY OF THYROID: ICD-10-CM

## 2023-09-14 DIAGNOSIS — Z23 NEED FOR INFLUENZA VACCINATION: ICD-10-CM

## 2023-09-14 DIAGNOSIS — M54.50 CHRONIC MIDLINE LOW BACK PAIN WITHOUT SCIATICA: Primary | ICD-10-CM

## 2023-09-14 DIAGNOSIS — N18.31 STAGE 3A CHRONIC KIDNEY DISEASE (HCC): ICD-10-CM

## 2023-09-14 DIAGNOSIS — G89.29 CHRONIC MIDLINE LOW BACK PAIN WITHOUT SCIATICA: Primary | ICD-10-CM

## 2023-09-14 ASSESSMENT — ENCOUNTER SYMPTOMS
ABDOMINAL PAIN: 0
WHEEZING: 0
CHEST TIGHTNESS: 0
SHORTNESS OF BREATH: 0
EYE PAIN: 0
NAUSEA: 0
COUGH: 0
SORE THROAT: 0
CONSTIPATION: 0

## 2023-09-14 NOTE — PROGRESS NOTES
Subjective:      Patient ID: Keo Harper is a 80 y.o. female. Anxiety   note       Spell  with  balance   2  weeks    ago  with  issues         Chronic  back pain  stable  and  hip  pain    tramadol   4 a  day   and  stable       Crf  stable       Bladder  pill     Gastroesophageal Reflux  She reports no abdominal pain, no chest pain, no coughing, no early satiety, no nausea, no sore throat or no wheezing. This is a chronic problem. The problem occurs rarely. The problem has been resolved. The symptoms are aggravated by certain foods. Pertinent negatives include no fatigue. She has tried a PPI for the symptoms. The treatment provided significant relief. Past Medical History:   Diagnosis Date    Acute blood loss as cause of postoperative anemia 12/2/2017    Alcohol abuse sober since Shahla Multani polyp     Dr. Vianney Ruvalcaba    Depression     Gastric ulcer 11/2017     wisser     GERD (gastroesophageal reflux disease)     PUD 1998 Dr. Alexia Garcias    Hyperlipidemia     Hypertension 12/3/2017    Hypertension 12/3/2017    Hypothyroid     Incisional hernia of anterior abdominal wall without obstruction or gangrene     Neuropathy, peripheral     Osteoarthritis     Osteoporosis     Pneumoperitoneum 11/29/2017    Thyroid disease     hypothyroid    Vitamin D deficiency       Review of Systems   Constitutional:  Negative for appetite change, fatigue and fever. HENT:  Negative for congestion, ear pain, postnasal drip and sore throat. Eyes:  Negative for pain and visual disturbance. Respiratory:  Negative for cough, chest tightness, shortness of breath and wheezing. Cardiovascular:  Negative for chest pain, palpitations and leg swelling. Gastrointestinal:  Negative for abdominal pain, constipation and nausea. Genitourinary:  Negative for dysuria and frequency. Musculoskeletal:  Negative for arthralgias, joint swelling, neck pain and neck stiffness. Skin:  Negative for rash.    Neurological:

## 2023-09-14 NOTE — PROGRESS NOTES
Immunizations Administered       Name Date Dose Route    Influenza, AFLURIA (age 1 yrs+), FLUZONE, (age 10 mo+), MDV, 0.5mL 9/14/2023 0.5 mL Intramuscular    Site: Deltoid- Left    Lot: [id number]    NDC: 29204-163-58

## 2023-09-27 DIAGNOSIS — G89.4 CHRONIC PAIN SYNDROME: ICD-10-CM

## 2023-09-27 DIAGNOSIS — M46.1 SI (SACROILIAC) JOINT INFLAMMATION (HCC): ICD-10-CM

## 2023-09-27 DIAGNOSIS — G89.29 CHRONIC MIDLINE LOW BACK PAIN WITHOUT SCIATICA: ICD-10-CM

## 2023-09-27 DIAGNOSIS — M54.50 CHRONIC MIDLINE LOW BACK PAIN WITHOUT SCIATICA: ICD-10-CM

## 2023-09-27 NOTE — TELEPHONE ENCOUNTER
Date of last visit:  9/14/2023  Date of next visit:  12/18/2023    Requested Prescriptions     Pending Prescriptions Disp Refills    traMADol (ULTRAM) 50 MG tablet 180 tablet 0     Sig: Take 2 tablets by mouth every 8 hours as needed for Pain for up to 30 days.

## 2023-09-28 ENCOUNTER — TELEPHONE (OUTPATIENT)
Dept: FAMILY MEDICINE CLINIC | Age: 88
End: 2023-09-28

## 2023-09-28 DIAGNOSIS — R53.81 PHYSICAL DECONDITIONING: ICD-10-CM

## 2023-09-28 DIAGNOSIS — M54.50 CHRONIC MIDLINE LOW BACK PAIN WITHOUT SCIATICA: Primary | ICD-10-CM

## 2023-09-28 DIAGNOSIS — R26.81 GAIT INSTABILITY: ICD-10-CM

## 2023-09-28 DIAGNOSIS — G89.29 CHRONIC MIDLINE LOW BACK PAIN WITHOUT SCIATICA: Primary | ICD-10-CM

## 2023-09-28 RX ORDER — TRAMADOL HYDROCHLORIDE 50 MG/1
100 TABLET ORAL EVERY 8 HOURS PRN
Qty: 180 TABLET | Refills: 0 | Status: SHIPPED | OUTPATIENT
Start: 2023-09-28 | End: 2023-10-28

## 2023-09-28 NOTE — TELEPHONE ENCOUNTER
Keisha George form [facility] called stating they did a clinic for Balance and Gentry Spivey would benefit from some therapy. Req an RX to Evaluate and Treat for Fall Risk.     Fax [phone]

## 2023-09-29 NOTE — TELEPHONE ENCOUNTER
Risk  of  fall and has walker      Weakness and arrange  for therapy       Order  for therapy  on printer

## 2023-10-23 NOTE — TELEPHONE ENCOUNTER
The pharmacy is requesting a refill of the below medication which has been pended for you:     Requested Prescriptions     Pending Prescriptions Disp Refills    pantoprazole (PROTONIX) 40 MG tablet [Pharmacy Med Name: PANTOPRAZOLE SOD DR 40 MG TAB] 180 tablet 1     Sig: take 1 tablet by mouth twice a day 30 MINUTES PRIOR TO BREAKFAST AND DINNER       Last Appointment Date: 9/14/2023  Next Appointment Date: 12/18/2023    Allergies   Allergen Reactions    Amoxicillin-Pot Clavulanate Diarrhea    Macrobid [Nitrofurantoin]     Statins Support Therapy      Unaware of allery    Sulfa Antibiotics Nausea Only

## 2023-10-24 RX ORDER — PANTOPRAZOLE SODIUM 40 MG/1
TABLET, DELAYED RELEASE ORAL
Qty: 180 TABLET | Refills: 1 | Status: SHIPPED | OUTPATIENT
Start: 2023-10-24

## 2023-10-27 ENCOUNTER — TELEPHONE (OUTPATIENT)
Dept: FAMILY MEDICINE CLINIC | Age: 88
End: 2023-10-27

## 2023-10-27 DIAGNOSIS — M81.0 AGE-RELATED OSTEOPOROSIS WITHOUT CURRENT PATHOLOGICAL FRACTURE: Primary | ICD-10-CM

## 2023-10-30 NOTE — TELEPHONE ENCOUNTER
Date of last visit:  9/14/2023  Date of next visit:  12/18/2023    Requested Prescriptions     Signed Prescriptions Disp Refills    denosumab (PROLIA) 60 MG/ML SOSY SC injection 180 mL 1     Sig: Inject 1 mL into the skin once for 1 dose     Authorizing Provider: Herlinda Umaña     Ordering User: Yudy MEYER       Prolia: [facility]  Lab: [facility]    Daughter Heri Patch) informed via Phone. She will take patient to get her labs drawn. Will also call [facility] and schedule her Dexa Scan.

## 2023-11-01 ENCOUNTER — NURSE ONLY (OUTPATIENT)
Dept: LAB | Age: 88
End: 2023-11-01

## 2023-11-01 DIAGNOSIS — M81.0 AGE-RELATED OSTEOPOROSIS WITHOUT CURRENT PATHOLOGICAL FRACTURE: ICD-10-CM

## 2023-11-01 LAB
ALBUMIN SERPL BCG-MCNC: 4.2 G/DL (ref 3.5–5.1)
ALP SERPL-CCNC: 82 U/L (ref 38–126)
ALT SERPL W/O P-5'-P-CCNC: 10 U/L (ref 11–66)
ANION GAP SERPL CALC-SCNC: 11 MEQ/L (ref 8–16)
AST SERPL-CCNC: 27 U/L (ref 5–40)
BILIRUB SERPL-MCNC: 0.2 MG/DL (ref 0.3–1.2)
BUN SERPL-MCNC: 20 MG/DL (ref 7–22)
CALCIUM SERPL-MCNC: 10.5 MG/DL (ref 8.5–10.5)
CHLORIDE SERPL-SCNC: 101 MEQ/L (ref 98–111)
CO2 SERPL-SCNC: 29 MEQ/L (ref 23–33)
CREAT SERPL-MCNC: 1.1 MG/DL (ref 0.4–1.2)
GFR SERPL CREATININE-BSD FRML MDRD: 47 ML/MIN/1.73M2
GLUCOSE SERPL-MCNC: 104 MG/DL (ref 70–108)
POTASSIUM SERPL-SCNC: 4.3 MEQ/L (ref 3.5–5.2)
PROT SERPL-MCNC: 7.1 G/DL (ref 6.1–8)
SODIUM SERPL-SCNC: 141 MEQ/L (ref 135–145)

## 2023-11-02 ENCOUNTER — TELEPHONE (OUTPATIENT)
Dept: FAMILY MEDICINE CLINIC | Age: 88
End: 2023-11-02

## 2023-11-02 NOTE — TELEPHONE ENCOUNTER
Prolia Order, Dexa Scan, Insurance Cards and Labs faxed to [facility] (Fax #: [phone]). They will forward the records to Pharmacy to check PA, and if not required they will forward patient information to Central Scheduling and they will contact the patient with an appt day and time. Kerry Rolon informed by Phone.

## 2023-11-02 NOTE — TELEPHONE ENCOUNTER
----- Message from Kaela Vang MD sent at 11/2/2023  5:01 AM EDT -----  Because labs are good kidney function is stable

## 2023-11-08 ENCOUNTER — OFFICE VISIT (OUTPATIENT)
Dept: FAMILY MEDICINE CLINIC | Age: 88
End: 2023-11-08

## 2023-11-08 VITALS
HEIGHT: 60 IN | HEART RATE: 76 BPM | RESPIRATION RATE: 16 BRPM | WEIGHT: 106.13 LBS | SYSTOLIC BLOOD PRESSURE: 112 MMHG | BODY MASS INDEX: 20.84 KG/M2 | DIASTOLIC BLOOD PRESSURE: 66 MMHG

## 2023-11-08 DIAGNOSIS — G89.4 CHRONIC PAIN SYNDROME: ICD-10-CM

## 2023-11-08 DIAGNOSIS — M46.1 SI (SACROILIAC) JOINT INFLAMMATION (HCC): ICD-10-CM

## 2023-11-08 DIAGNOSIS — M54.50 CHRONIC MIDLINE LOW BACK PAIN WITHOUT SCIATICA: ICD-10-CM

## 2023-11-08 DIAGNOSIS — K21.9 GASTROESOPHAGEAL REFLUX DISEASE WITHOUT ESOPHAGITIS: ICD-10-CM

## 2023-11-08 DIAGNOSIS — G89.29 CHRONIC MIDLINE LOW BACK PAIN WITHOUT SCIATICA: ICD-10-CM

## 2023-11-08 DIAGNOSIS — R10.11 RIGHT UPPER QUADRANT PAIN: Primary | ICD-10-CM

## 2023-11-08 PROCEDURE — 1036F TOBACCO NON-USER: CPT | Performed by: FAMILY MEDICINE

## 2023-11-08 PROCEDURE — G8427 DOCREV CUR MEDS BY ELIG CLIN: HCPCS | Performed by: FAMILY MEDICINE

## 2023-11-08 PROCEDURE — 99213 OFFICE O/P EST LOW 20 MIN: CPT | Performed by: FAMILY MEDICINE

## 2023-11-08 PROCEDURE — G8420 CALC BMI NORM PARAMETERS: HCPCS | Performed by: FAMILY MEDICINE

## 2023-11-08 PROCEDURE — 1123F ACP DISCUSS/DSCN MKR DOCD: CPT | Performed by: FAMILY MEDICINE

## 2023-11-08 PROCEDURE — 1090F PRES/ABSN URINE INCON ASSESS: CPT | Performed by: FAMILY MEDICINE

## 2023-11-08 RX ORDER — TRAMADOL HYDROCHLORIDE 50 MG/1
100 TABLET ORAL EVERY 8 HOURS PRN
Qty: 180 TABLET | Refills: 0 | Status: SHIPPED | OUTPATIENT
Start: 2023-11-08 | End: 2023-12-08

## 2023-11-08 RX ORDER — SUCRALFATE 1 G/1
1 TABLET ORAL 4 TIMES DAILY
Qty: 60 TABLET | Refills: 1 | Status: SHIPPED | OUTPATIENT
Start: 2023-11-08

## 2023-11-08 ASSESSMENT — ENCOUNTER SYMPTOMS
EYE PAIN: 0
ABDOMINAL PAIN: 1
SORE THROAT: 0
WHEEZING: 0
CHEST TIGHTNESS: 0
NAUSEA: 0
CONSTIPATION: 0
COUGH: 0
SHORTNESS OF BREATH: 0

## 2023-11-08 NOTE — PROGRESS NOTES
Subjective:      Patient ID: Danny Hanley is a 80 y.o. female. Spicy pain  noted       Liver  dfunctions  done     Abdominal Pain  This is a new problem. The current episode started 1 to 4 weeks ago (2 weeks). The problem has been gradually worsening. The pain is located in the RUQ. The pain is at a severity of 3/10. The quality of the pain is aching and burning. The abdominal pain radiates to the LUQ. Pertinent negatives include no arthralgias, constipation, dysuria, fever, frequency, headaches or nausea. Nothing aggravates the pain. The pain is relieved by Nothing. The treatment provided mild relief. Past Medical History:   Diagnosis Date    Acute blood loss as cause of postoperative anemia 12/2/2017    Alcohol abuse sober since Cody Tamayo Covert    Depression     Gastric ulcer 11/2017     nathanael     GERD (gastroesophageal reflux disease)     PUD 1998 Dr. Garrett Johnson    Hyperlipidemia     Hypertension 12/3/2017    Hypertension 12/3/2017    Hypothyroid     Incisional hernia of anterior abdominal wall without obstruction or gangrene     Neuropathy, peripheral     Osteoarthritis     Osteoporosis     Pneumoperitoneum 11/29/2017    Thyroid disease     hypothyroid    Vitamin D deficiency       Review of Systems   Constitutional:  Negative for appetite change, fatigue and fever. HENT:  Negative for congestion, ear pain, postnasal drip and sore throat. Eyes:  Negative for pain and visual disturbance. Respiratory:  Negative for cough, chest tightness, shortness of breath and wheezing. Cardiovascular:  Negative for chest pain, palpitations and leg swelling. Gastrointestinal:  Positive for abdominal pain. Negative for constipation and nausea. Eating  now  worse     Genitourinary:  Negative for dysuria and frequency. Musculoskeletal:  Negative for arthralgias, joint swelling, neck pain and neck stiffness. Skin:  Negative for rash.    Neurological:

## 2023-11-14 ENCOUNTER — HOSPITAL ENCOUNTER (OUTPATIENT)
Dept: PHYSICAL THERAPY | Age: 88
Setting detail: THERAPIES SERIES
Discharge: HOME | End: 2023-11-14
Payer: MEDICARE

## 2023-11-14 PROCEDURE — 97110 THERAPEUTIC EXERCISES: CPT

## 2023-11-14 PROCEDURE — 97530 THERAPEUTIC ACTIVITIES: CPT

## 2023-11-14 PROCEDURE — 97165 OT EVAL LOW COMPLEX 30 MIN: CPT

## 2023-11-14 NOTE — PROGRESS NOTES
coordination, and endurance and to decrease constipation and leakage all to decrease pelvic floor dysfunction. Without skilled OT services the pt is at risk for worsening constipation and leakage which could lead to decreased engagement in meaningful daily occupations and quality of life. Body Structures/Functions/Activity Limitations: PFM weakness with decreased localization and endurance, Decreased awareness of normal bladder and bowel habits, control, and diet effects on functioning, and Abdominal and core weakness  Prognosis: Good    GOALS:  Patient Goal: to decrease constipation     Short Term Goals: 6 weeks   This pt will demo the ability to completely and instantly relax the PFM in order to promote bowel contraction and ease of defecation. This pt will be able to describe normal bowel function, diet impact on the bowel, and have a good understanding of bowel anatomy and physiology to promote insight into condition. This pt will demo a good understanding of proper toilet posture to decrease PFM tone and to improve visceral alignment for increased ease of defecation and reduced pelvic organ strain. This pt will demonstrate independence with basic HEP designed to increase flexibility of the pelvic girdle and to strengthen the core for maximal symptom reduction. Pt will be able to identify normal bladder function/voiding patterns, diet impact on the bladder, and urge control strategies to enhance pt insight into potential causative factors and promote increased bladder control. The pt will demonstrate well localized PFM contraction in order to increase the efficacy of strengthening program.   Pt will demonstrate the ability to delay urgency for 5' with good control to enable time to get to the bathroom. 6.  This pt will demo proper use of pelvic brace lifting, push, and pull with pelvic brace in order to promote continence during functional tasks.         Long Term Goals: 12 weeks  This pt

## 2023-11-20 ENCOUNTER — TELEPHONE (OUTPATIENT)
Dept: FAMILY MEDICINE CLINIC | Age: 88
End: 2023-11-20

## 2023-11-20 NOTE — TELEPHONE ENCOUNTER
Pt called stating that pharmacy had her  her RX and there was a RX of sucralfate. She asked if she is suppose to continue this medication.  She thought she was not suppose to.    Mary Nunez may be reached at [phone]

## 2023-11-27 ENCOUNTER — HOSPITAL ENCOUNTER (OUTPATIENT)
Dept: NURSING | Age: 88
Discharge: HOME | End: 2023-11-27
Payer: MEDICARE

## 2023-11-27 VITALS
SYSTOLIC BLOOD PRESSURE: 143 MMHG | HEART RATE: 68 BPM | RESPIRATION RATE: 18 BRPM | OXYGEN SATURATION: 98 % | DIASTOLIC BLOOD PRESSURE: 70 MMHG

## 2023-11-27 DIAGNOSIS — M81.0 AGE-RELATED OSTEOPOROSIS WITHOUT CURRENT PATHOLOGICAL FRACTURE: Primary | ICD-10-CM

## 2023-11-27 DIAGNOSIS — M81.0 SENILE OSTEOPOROSIS: ICD-10-CM

## 2023-11-27 PROCEDURE — 6360000002 HC RX W HCPCS: Performed by: FAMILY MEDICINE

## 2023-11-27 PROCEDURE — 96372 THER/PROPH/DIAG INJ SC/IM: CPT

## 2023-11-27 RX ADMIN — DENOSUMAB 60 MG: 60 INJECTION SUBCUTANEOUS at 16:14

## 2023-11-27 NOTE — PROGRESS NOTES
1550 Patient in wheelchair to OPN for Prolia injection. Patient verbalizes understanding of injection. PT RIGHTS AND RESPONSIBILITIES OFFERED TO PT.     1402 Injection given to patient. Tolerated well. AVS reviewed with patient. Patient left in wheelchair to lobby.      _M___ Safety:       (Environmental)  [place] to environment  Ensure ID band is correct and in place/ allergy band as needed  Assess for fall risk  Initiate fall precautions as applicable (fall band, side rails, etc.)  Call light within reach  Bed in low position/ wheels locked    _M___ Pain:       Assess pain level and characteristics  Administer analgesics as ordered  Assess effectiveness of pain management and report to MD as needed    _M___ Knowledge Deficit:  Assess baseline knowledge  Provide teaching at level of understanding  Provide teaching via preferred learning method  Evaluate teaching effectiveness    _M___ Hemodynamic/Respiratory Status:       (Pre and Post Procedure Monitoring)  Assess/Monitor vital signs and LOC  Assess Baseline SpO2 prior to any sedation  Obtain weight/height  Assess vital signs/ LOC until patient meets discharge criteria  Monitor procedure site and notify MD of any issues

## 2023-11-27 NOTE — DISCHARGE INSTRUCTIONS
Prolia injection discharge instructions:    Side effects: headache, joint pain, rash, swelling    Next Prolia injection on: 5/28/24 @ 2:30pm      Please call [phone] with a any questions or concerns.

## 2023-11-29 DIAGNOSIS — R10.11 RIGHT UPPER QUADRANT PAIN: ICD-10-CM

## 2023-11-29 DIAGNOSIS — K21.9 GASTROESOPHAGEAL REFLUX DISEASE WITHOUT ESOPHAGITIS: ICD-10-CM

## 2023-11-29 RX ORDER — SUCRALFATE 1 G/1
1 TABLET ORAL 4 TIMES DAILY
Qty: 60 TABLET | Refills: 1 | Status: SHIPPED | OUTPATIENT
Start: 2023-11-29

## 2023-11-29 NOTE — TELEPHONE ENCOUNTER
Date of last visit:  11/8/2023  Date of next visit:  12/18/2023    Requested Prescriptions     Pending Prescriptions Disp Refills    sucralfate (CARAFATE) 1 GM tablet [Pharmacy Med Name: SUCRALFATE 1 GM TABLET] 60 tablet 1     Sig: take 1 tablet by mouth four times a day Advancement-Rotation Flap Text: The defect edges were debeveled with a #15 scalpel blade.  Given the location of the defect, shape of the defect and the proximity to free margins an advancement-rotation flap was deemed most appropriate.  Using a sterile surgical marker, an appropriate flap was drawn incorporating the defect and placing the expected incisions within the relaxed skin tension lines where possible. The area thus outlined was incised deep to adipose tissue with a #15 scalpel blade.  The skin margins were undermined to an appropriate distance in all directions utilizing iris scissors.

## 2023-11-29 NOTE — DISCHARGE SUMMARY
[facility]  OUTPATIENT OCCUPATIONAL THERAPY QUICK DISCHARGE NOTE  Specialized Therapy Services    Date: 2023  Patient Name: Leticia Eason        CSN: [id number]   : 1931  (89 y.o.)  Gender: female   Jose Hunt MD,    No admission diagnoses are documented for this encounter.,      Patient is discharged from Occupational Therapy services at this time.  See last note for details related to results of therapy and goal achievement.    Reason for discharge: Patient requested to be discharged.      Claire PARHAM, OTR/L [id number]

## 2023-12-05 NOTE — PROGRESS NOTES
It was a pleasure seeing you in the clinic today.  As discussed, we made the following updates to your plan of care:       An XR order was added today.  I will send you a message about the x-ray result through [employer].     A MRI order was added today.  Radiology will call you to schedule or you can schedule after today's visit. You may also call [facility] Imaging Scheduling directly if you do not hear from them in the next couple of days.    Imaging scheduling  [facility] [phone] Clinics and Surgery Center [facility] ([facility] and [facility]), [facility], including [facility], [facility] [phone] [facility], [facility] Clinics including [facility], [facility], and [facility] [phone] [facility], [facility], [facility], [facility], including [place], [facility], and [facility] [phone] [facility], [facility], [facility] Clinics, including [place], [facility], and [place]     An order for physical therapy was added today. Physical therapy schedulers should call you in the next few days to schedule an appointment. You may also call [phone] to arrange an appointment at any of the [facility] outpatient physical therapy locations.  It will be very important for you to do the physical therapy exercises on a regular basis to decrease your pain and prevent future flares of pain.       Please schedule follow up with me after MRI. (Please schedule the follow up at least 1 day after the MRI to give radiology time to do the report as well.)    Limit lifting to 10 lbs and avoid excessive bending or twisting.     If you have increased weakness or changes with losing control or bladder or bladder, increased severe pain please go to the emergency department.     Thank  you,  Megan Menon NP  Physical Medicine and Rehabilitation, Medical Spine  PM&R clinic Phone: [phone]  PM[facility]R clinic Fax: [phone]     adaptations if needed; standing tolerance    Goals:  Patient goals : \"I want to heal and get back to being active. \" pt states. Short term goals  Time Frame for Short term goals: 1 week  Short term goal 1: Pt will complete lower body ADLs while using any AE needed for pain control to increase her independence with self care. Short term goal 2: Pt will demonstrate functional mobility walking to/from the bathroom and in the hallway around obstacles with SBA to prepare for doing simple cooking tasks or self care at the sink. Short term goal 3: Pt will complete toileting routine including transfers to/from the elevated toilet seat with Supervision to increase her independence with self care. Short term goal 4: Pt will complete simple standing tasks for over 5 minute duration with SBA to increase her endurance for ease of showering. Long term goals  Time Frame for Long term goals : None secondary to short estimated length of stay.           AM-PAC Inpatient Daily Activity Raw Score: 19  AM-PAC Inpatient ADL T-Scale Score : 40.22  ADL Inpatient CMS 0-100% Score: 42.8  ADL Inpatient CMS G-Code Modifier : CK

## 2023-12-08 ENCOUNTER — HOSPITAL ENCOUNTER (OUTPATIENT)
Dept: WOMENS IMAGING | Age: 88
Discharge: HOME | End: 2023-12-08
Payer: MEDICARE

## 2023-12-08 ENCOUNTER — HOSPITAL ENCOUNTER (OUTPATIENT)
Dept: WOMENS IMAGING | Age: 88
Discharge: HOME | End: 2023-12-08
Attending: FAMILY MEDICINE
Payer: MEDICARE

## 2023-12-08 VITALS — HEIGHT: 57 IN | BODY MASS INDEX: 23.04 KG/M2 | WEIGHT: 106.8 LBS

## 2023-12-08 DIAGNOSIS — Z12.31 VISIT FOR SCREENING MAMMOGRAM: ICD-10-CM

## 2023-12-08 DIAGNOSIS — M81.0 AGE-RELATED OSTEOPOROSIS WITHOUT CURRENT PATHOLOGICAL FRACTURE: ICD-10-CM

## 2023-12-08 PROCEDURE — 77063 BREAST TOMOSYNTHESIS BI: CPT

## 2023-12-08 PROCEDURE — 77080 DXA BONE DENSITY AXIAL: CPT

## 2023-12-10 DIAGNOSIS — E03.4 HYPOTHYROIDISM DUE TO ACQUIRED ATROPHY OF THYROID: ICD-10-CM

## 2023-12-11 ENCOUNTER — TELEPHONE (OUTPATIENT)
Dept: FAMILY MEDICINE CLINIC | Age: 88
End: 2023-12-11

## 2023-12-11 RX ORDER — LEVOTHYROXINE SODIUM 0.05 MG/1
TABLET ORAL
Qty: 90 TABLET | Refills: 1 | Status: SHIPPED | OUTPATIENT
Start: 2023-12-11

## 2023-12-11 NOTE — TELEPHONE ENCOUNTER
Date of last visit:  11/8/2023  Date of next visit:  12/11/2023    Requested Prescriptions     Pending Prescriptions Disp Refills    levothyroxine (SYNTHROID) 50 MCG tablet [Pharmacy Med Name: LEVOTHYROXINE 50 MCG TABLET] 90 tablet 1     Sig: take 1 tablet by mouth once daily

## 2023-12-11 NOTE — TELEPHONE ENCOUNTER
----- Message from Shantal Cisneros MD sent at 12/10/2023  9:09 AM EST -----  Bone density scan overall about the same which is good that is not worse and may be slightly improved to stay on Prolia and calcium supplement    Please call

## 2023-12-12 DIAGNOSIS — G60.9 IDIOPATHIC PERIPHERAL NEUROPATHY: ICD-10-CM

## 2023-12-12 NOTE — TELEPHONE ENCOUNTER
Pt called to req an refill on the following    gabapentin (NEURONTIN) 300 MG capsule  Take 1 capsule by mouth nightly     Please send 90 days to [facility] [address]

## 2023-12-12 NOTE — TELEPHONE ENCOUNTER
Date of last visit:  11/8/2023  Date of next visit:  12/18/2023    Requested Prescriptions     Pending Prescriptions Disp Refills    gabapentin (NEURONTIN) 300 MG capsule 90 capsule 1     Sig: Take 1 capsule by mouth nightly for 180 days.

## 2023-12-13 RX ORDER — GABAPENTIN 300 MG/1
300 CAPSULE ORAL NIGHTLY
Qty: 90 CAPSULE | Refills: 1 | Status: SHIPPED | OUTPATIENT
Start: 2023-12-13 | End: 2024-06-10

## 2023-12-15 ENCOUNTER — TELEPHONE (OUTPATIENT)
Dept: FAMILY MEDICINE CLINIC | Age: 88
End: 2023-12-15

## 2023-12-15 NOTE — TELEPHONE ENCOUNTER
Pt called stating she took medication to help with her constipation and now she is having diarrhea pt has been taking imodium with no success pt is asking for an medication    Please call pt once addressed [phone]

## 2023-12-15 NOTE — TELEPHONE ENCOUNTER
Per verbal order from Dr Juana Marquis: Judy Angulo diet, increase fluids, continue to use Imodium    Queenie [place] informed by Phone.

## 2023-12-23 DIAGNOSIS — K21.9 GASTROESOPHAGEAL REFLUX DISEASE WITHOUT ESOPHAGITIS: ICD-10-CM

## 2023-12-23 DIAGNOSIS — R10.11 RIGHT UPPER QUADRANT PAIN: ICD-10-CM

## 2023-12-26 NOTE — TELEPHONE ENCOUNTER
Date of last visit:  12/18/2023  Date of next visit:  12/24/2023    Requested Prescriptions     Pending Prescriptions Disp Refills    sucralfate (CARAFATE) 1 GM tablet [Pharmacy Med Name: SUCRALFATE 1 GM TABLET] 60 tablet 1     Sig: take 1 tablet by mouth four times a day

## 2023-12-27 RX ORDER — SUCRALFATE 1 G/1
1 TABLET ORAL 4 TIMES DAILY
Qty: 120 TABLET | Refills: 1 | Status: SHIPPED | OUTPATIENT
Start: 2023-12-27

## 2024-01-03 DIAGNOSIS — G89.4 CHRONIC PAIN SYNDROME: ICD-10-CM

## 2024-01-03 DIAGNOSIS — G89.29 CHRONIC MIDLINE LOW BACK PAIN WITHOUT SCIATICA: ICD-10-CM

## 2024-01-03 DIAGNOSIS — M46.1 SI (SACROILIAC) JOINT INFLAMMATION (HCC): ICD-10-CM

## 2024-01-03 DIAGNOSIS — M54.50 CHRONIC MIDLINE LOW BACK PAIN WITHOUT SCIATICA: ICD-10-CM

## 2024-01-03 NOTE — TELEPHONE ENCOUNTER
Date of last visit:  12/18/2023  Date of next visit:  3/18/2024    Requested Prescriptions     Pending Prescriptions Disp Refills    traMADol (ULTRAM) 50 MG tablet 180 tablet 0     Sig: Take 2 tablets by mouth every 8 hours as needed for Pain for up to 30 days.

## 2024-01-03 NOTE — TELEPHONE ENCOUNTER
Pt called requesting Rx for Tramadol 50 mg take 2 every 8 hours prn for pain    [facility] [address]    Reminded pt of appt here on 3/18/24

## 2024-01-04 RX ORDER — TRAMADOL HYDROCHLORIDE 50 MG/1
100 TABLET ORAL EVERY 8 HOURS PRN
Qty: 180 TABLET | Refills: 0 | Status: SHIPPED | OUTPATIENT
Start: 2024-01-04 | End: 2024-02-03

## 2024-01-15 ENCOUNTER — HOSPITAL ENCOUNTER (OUTPATIENT)
Dept: PHYSICAL THERAPY | Age: 89
Setting detail: THERAPIES SERIES
Discharge: HOME | End: 2024-01-15

## 2024-02-08 ENCOUNTER — TELEPHONE (OUTPATIENT)
Dept: FAMILY MEDICINE CLINIC | Age: 89
End: 2024-02-08

## 2024-02-08 RX ORDER — CEFUROXIME AXETIL 250 MG/1
250 TABLET ORAL 2 TIMES DAILY
Qty: 20 TABLET | Refills: 0 | Status: SHIPPED | OUTPATIENT
Start: 2024-02-08 | End: 2024-02-18

## 2024-02-08 NOTE — TELEPHONE ENCOUNTER
Date of last visit:  12/18/2023  Date of next visit:  3/18/2024    Requested Prescriptions     Signed Prescriptions Disp Refills    cefUROXime (CEFTIN) 250 MG tablet 20 tablet 0     Sig: Take 1 tablet by mouth 2 times daily for 10 days     Authorizing Provider: TIARRA MARIN     Ordering User: ZABRINA MEYER       Called patient. No answer and could not leave a Voicemail.

## 2024-02-08 NOTE — TELEPHONE ENCOUNTER
Pt called stating the atb what was prescribed did work but now she stated it has came back she is just having frequency     Please send to [facility] [address]    Please call pt once addressed

## 2024-02-17 DIAGNOSIS — F41.9 ANXIETY: ICD-10-CM

## 2024-02-17 DIAGNOSIS — F33.41 RECURRENT MAJOR DEPRESSIVE DISORDER, IN PARTIAL REMISSION (HCC): ICD-10-CM

## 2024-02-19 NOTE — TELEPHONE ENCOUNTER
Date of last visit:  12/18/2023  Date of next visit:  3/18/2024    Requested Prescriptions     Pending Prescriptions Disp Refills    PARoxetine (PAXIL) 10 MG tablet [Pharmacy Med Name: PAROXETINE HCL 10 MG TABLET] 90 tablet 1     Sig: take 1 tablet by mouth once daily

## 2024-02-20 RX ORDER — PAROXETINE 10 MG/1
10 TABLET, FILM COATED ORAL DAILY
Qty: 90 TABLET | Refills: 1 | Status: SHIPPED | OUTPATIENT
Start: 2024-02-20

## 2024-02-28 DIAGNOSIS — G89.29 CHRONIC MIDLINE LOW BACK PAIN WITHOUT SCIATICA: ICD-10-CM

## 2024-02-28 DIAGNOSIS — G89.4 CHRONIC PAIN SYNDROME: ICD-10-CM

## 2024-02-28 DIAGNOSIS — M46.1 SI (SACROILIAC) JOINT INFLAMMATION (HCC): ICD-10-CM

## 2024-02-28 DIAGNOSIS — M54.50 CHRONIC MIDLINE LOW BACK PAIN WITHOUT SCIATICA: ICD-10-CM

## 2024-02-29 RX ORDER — TRAMADOL HYDROCHLORIDE 50 MG/1
100 TABLET ORAL EVERY 8 HOURS PRN
Qty: 180 TABLET | Refills: 0 | Status: SHIPPED | OUTPATIENT
Start: 2024-02-29 | End: 2024-03-30

## 2024-03-18 ENCOUNTER — OFFICE VISIT (OUTPATIENT)
Dept: FAMILY MEDICINE CLINIC | Age: 89
End: 2024-03-18

## 2024-03-18 VITALS
HEIGHT: 57 IN | WEIGHT: 103.5 LBS | DIASTOLIC BLOOD PRESSURE: 68 MMHG | BODY MASS INDEX: 22.33 KG/M2 | SYSTOLIC BLOOD PRESSURE: 118 MMHG | RESPIRATION RATE: 16 BRPM | HEART RATE: 92 BPM

## 2024-03-18 DIAGNOSIS — R35.0 URINARY FREQUENCY: ICD-10-CM

## 2024-03-18 DIAGNOSIS — N18.31 STAGE 3A CHRONIC KIDNEY DISEASE (HCC): ICD-10-CM

## 2024-03-18 DIAGNOSIS — H35.3220 EXUDATIVE AGE-RELATED MACULAR DEGENERATION OF LEFT EYE, UNSPECIFIED STAGE (HCC): ICD-10-CM

## 2024-03-18 DIAGNOSIS — G89.29 CHRONIC MIDLINE LOW BACK PAIN WITHOUT SCIATICA: Primary | ICD-10-CM

## 2024-03-18 DIAGNOSIS — Z91.89 STREPTOCOCCUS PNEUMONIAE VACCINATION INDICATED: ICD-10-CM

## 2024-03-18 DIAGNOSIS — M54.50 CHRONIC MIDLINE LOW BACK PAIN WITHOUT SCIATICA: Primary | ICD-10-CM

## 2024-03-18 DIAGNOSIS — K21.9 GASTROESOPHAGEAL REFLUX DISEASE WITHOUT ESOPHAGITIS: ICD-10-CM

## 2024-03-18 DIAGNOSIS — M81.0 AGE-RELATED OSTEOPOROSIS WITHOUT CURRENT PATHOLOGICAL FRACTURE: ICD-10-CM

## 2024-03-18 DIAGNOSIS — F33.41 RECURRENT MAJOR DEPRESSIVE DISORDER, IN PARTIAL REMISSION (HCC): ICD-10-CM

## 2024-03-18 DIAGNOSIS — L57.0 ACTINIC KERATOSIS: ICD-10-CM

## 2024-03-18 DIAGNOSIS — E78.00 PURE HYPERCHOLESTEROLEMIA: ICD-10-CM

## 2024-03-18 DIAGNOSIS — M46.1 SI (SACROILIAC) JOINT INFLAMMATION (HCC): ICD-10-CM

## 2024-03-18 DIAGNOSIS — E03.4 HYPOTHYROIDISM DUE TO ACQUIRED ATROPHY OF THYROID: ICD-10-CM

## 2024-03-18 DIAGNOSIS — N31.9 NEUROGENIC BLADDER: ICD-10-CM

## 2024-03-18 LAB
BACTERIA URINE, POC: NORMAL
BILIRUBIN URINE: 0 MG/DL
BLOOD, URINE: NEGATIVE
CASTS URINE, POC: NORMAL
CLARITY: CLEAR
COLOR: YELLOW
CRYSTALS URINE, POC: NORMAL
EPI CELLS URINE, POC: NORMAL
GLUCOSE URINE: NEGATIVE
KETONES, URINE: NEGATIVE
LEUKOCYTE EST, POC: NEGATIVE
NITRITE, URINE: NEGATIVE
PH UA: 7 (ref 4.5–8)
PROTEIN UA: NEGATIVE
RBC URINE, POC: NORMAL
SPECIFIC GRAVITY UA: 1 (ref 1–1.03)
UROBILINOGEN, URINE: NORMAL
WBC URINE, POC: NORMAL
YEAST URINE, POC: NORMAL

## 2024-03-18 PROCEDURE — 81000 URINALYSIS NONAUTO W/SCOPE: CPT | Performed by: FAMILY MEDICINE

## 2024-03-18 PROCEDURE — G8427 DOCREV CUR MEDS BY ELIG CLIN: HCPCS | Performed by: FAMILY MEDICINE

## 2024-03-18 PROCEDURE — 90677 PCV20 VACCINE IM: CPT | Performed by: FAMILY MEDICINE

## 2024-03-18 PROCEDURE — 99213 OFFICE O/P EST LOW 20 MIN: CPT | Performed by: FAMILY MEDICINE

## 2024-03-18 PROCEDURE — G0009 ADMIN PNEUMOCOCCAL VACCINE: HCPCS | Performed by: FAMILY MEDICINE

## 2024-03-18 PROCEDURE — 1123F ACP DISCUSS/DSCN MKR DOCD: CPT | Performed by: FAMILY MEDICINE

## 2024-03-18 PROCEDURE — 17000 DESTRUCT PREMALG LESION: CPT | Performed by: FAMILY MEDICINE

## 2024-03-18 ASSESSMENT — ENCOUNTER SYMPTOMS
COUGH: 0
NAUSEA: 0
CONSTIPATION: 0
EYE PAIN: 0
ABDOMINAL PAIN: 0
SORE THROAT: 0
CHEST TIGHTNESS: 0

## 2024-03-18 ASSESSMENT — PATIENT HEALTH QUESTIONNAIRE - PHQ9
6. FEELING BAD ABOUT YOURSELF - OR THAT YOU ARE A FAILURE OR HAVE LET YOURSELF OR YOUR FAMILY DOWN: NOT AT ALL
SUM OF ALL RESPONSES TO PHQ9 QUESTIONS 1 & 2: 0
SUM OF ALL RESPONSES TO PHQ QUESTIONS 1-9: 0
2. FEELING DOWN, DEPRESSED OR HOPELESS: NOT AT ALL
4. FEELING TIRED OR HAVING LITTLE ENERGY: NOT AT ALL
7. TROUBLE CONCENTRATING ON THINGS, SUCH AS READING THE NEWSPAPER OR WATCHING TELEVISION: NOT AT ALL
9. THOUGHTS THAT YOU WOULD BE BETTER OFF DEAD, OR OF HURTING YOURSELF: NOT AT ALL
3. TROUBLE FALLING OR STAYING ASLEEP: NOT AT ALL
8. MOVING OR SPEAKING SO SLOWLY THAT OTHER PEOPLE COULD HAVE NOTICED. OR THE OPPOSITE, BEING SO FIGETY OR RESTLESS THAT YOU HAVE BEEN MOVING AROUND A LOT MORE THAN USUAL: NOT AT ALL
SUM OF ALL RESPONSES TO PHQ QUESTIONS 1-9: 0
5. POOR APPETITE OR OVEREATING: NOT AT ALL
1. LITTLE INTEREST OR PLEASURE IN DOING THINGS: NOT AT ALL
SUM OF ALL RESPONSES TO PHQ QUESTIONS 1-9: 0
10. IF YOU CHECKED OFF ANY PROBLEMS, HOW DIFFICULT HAVE THESE PROBLEMS MADE IT FOR YOU TO DO YOUR WORK, TAKE CARE OF THINGS AT HOME, OR GET ALONG WITH OTHER PEOPLE: NOT DIFFICULT AT ALL
SUM OF ALL RESPONSES TO PHQ QUESTIONS 1-9: 0

## 2024-03-18 NOTE — PROGRESS NOTES
Immunizations Administered       Name Date Dose Route    Pneumococcal, PCV20, PREVNAR 20, (age 6w+), IM, 0.5mL 3/18/2024 0.5 mL Intramuscular    Site: Deltoid- Right    Lot: [id number]    NDC: 4034-1736-60          
and nausea.   Genitourinary:  Positive for frequency. Negative for dysuria and hematuria.   Musculoskeletal:  Positive for back pain. Negative for arthralgias, joint swelling, neck pain and neck stiffness.   Skin:  Negative for rash.   Neurological:  Negative for dizziness, weakness, numbness and headaches.   Hematological:  Negative for adenopathy. Does not bruise/bleed easily.   Psychiatric/Behavioral:  Negative for behavioral problems and sleep disturbance. The patient is not nervous/anxious.      /68 (Site: Right Upper Arm, Position: Sitting, Cuff Size: Medium Adult)   Pulse 92   Resp 16   Ht 1.448 m (4' 9\")   Wt 46.9 kg (103 lb 8 oz)   BMI 22.40 kg/m²    Objective:   Physical Exam  Vitals and nursing note reviewed.   Constitutional:       Appearance: She is well-developed.   HENT:      Head: Normocephalic and atraumatic.      Right Ear: External ear normal.      Left Ear: External ear normal.      Nose: Nose normal.   Eyes:      General: No scleral icterus.     Conjunctiva/sclera: Conjunctivae normal.      Pupils: Pupils are equal, round, and reactive to light.   Neck:      Thyroid: No thyromegaly.      Vascular: No JVD.   Cardiovascular:      Rate and Rhythm: Normal rate and regular rhythm.      Heart sounds: Normal heart sounds.   Pulmonary:      Effort: Pulmonary effort is normal.      Breath sounds: Normal breath sounds. No wheezing or rales.   Abdominal:      General: Bowel sounds are normal. There is no distension.      Palpations: Abdomen is soft. There is no mass.      Tenderness: There is no abdominal tenderness.   Musculoskeletal:         General: No tenderness. Normal range of motion.      Cervical back: Normal range of motion and neck supple.   Lymphadenopathy:      Cervical: No cervical adenopathy.   Skin:     General: Skin is warm and dry.      Findings: No rash.      Comments:    LEFT  LATERAL  LEG LESION    actinic  keratosis    Neurological:      Mental Status: She is alert and

## 2024-04-11 NOTE — TELEPHONE ENCOUNTER
Date of last visit:  3/18/2024  Date of next visit:  6/19/2024    Requested Prescriptions     Pending Prescriptions Disp Refills    pantoprazole (PROTONIX) 40 MG tablet [Pharmacy Med Name: PANTOPRAZOLE SOD DR 40 MG TAB] 180 tablet 1     Sig: take 1 tablet by mouth twice a day 30 MINUTES PRIOR TO BREAKFAST AND DINNER

## 2024-04-12 DIAGNOSIS — F41.9 ANXIETY: ICD-10-CM

## 2024-04-12 RX ORDER — LORAZEPAM 0.5 MG/1
TABLET ORAL
Qty: 120 TABLET | Refills: 1 | Status: SHIPPED | OUTPATIENT
Start: 2024-04-12 | End: 2024-06-13

## 2024-04-12 RX ORDER — PANTOPRAZOLE SODIUM 40 MG/1
TABLET, DELAYED RELEASE ORAL
Qty: 180 TABLET | Refills: 1 | Status: SHIPPED | OUTPATIENT
Start: 2024-04-12

## 2024-04-12 NOTE — TELEPHONE ENCOUNTER
Date of last visit:  3/18/2024  Date of next visit:  6/19/2024    Requested Prescriptions     Pending Prescriptions Disp Refills    LORazepam (ATIVAN) 0.5 MG tablet 120 tablet 1     Sig: One tab po every 6 hours prn anxiety

## 2024-04-12 NOTE — TELEPHONE ENCOUNTER
Leticia called requesting a refill of their:    LORazepam (ATIVAN) 0.5 MG tablet One tab po every 6 hours prn anxiety     Send to [facility]

## 2024-04-19 DIAGNOSIS — G89.29 CHRONIC MIDLINE LOW BACK PAIN WITHOUT SCIATICA: ICD-10-CM

## 2024-04-19 DIAGNOSIS — G89.4 CHRONIC PAIN SYNDROME: ICD-10-CM

## 2024-04-19 DIAGNOSIS — M46.1 SI (SACROILIAC) JOINT INFLAMMATION (HCC): ICD-10-CM

## 2024-04-19 DIAGNOSIS — M54.50 CHRONIC MIDLINE LOW BACK PAIN WITHOUT SCIATICA: ICD-10-CM

## 2024-04-19 NOTE — TELEPHONE ENCOUNTER
The pharmacy is  requesting a refill of the below medication which has been pended for you:     Requested Prescriptions     Pending Prescriptions Disp Refills    traMADol (ULTRAM) 50 MG tablet 180 tablet 0     Sig: Take 2 tablets by mouth every 8 hours as needed for Pain for up to 30 days.       Last Appointment Date: 3/18/2024  Next Appointment Date: 6/19/2024    Allergies   Allergen Reactions    Amoxicillin-Pot Clavulanate Diarrhea    Macrobid [Nitrofurantoin]     Statins Support Therapy      Unaware of allery    Sulfa Antibiotics Nausea Only

## 2024-04-20 RX ORDER — TRAMADOL HYDROCHLORIDE 50 MG/1
100 TABLET ORAL EVERY 8 HOURS PRN
Qty: 180 TABLET | Refills: 0 | Status: SHIPPED | OUTPATIENT
Start: 2024-04-20 | End: 2024-05-20

## 2024-04-22 ENCOUNTER — TELEPHONE (OUTPATIENT)
Dept: FAMILY MEDICINE CLINIC | Age: 89
End: 2024-04-22

## 2024-04-22 NOTE — TELEPHONE ENCOUNTER
----- Message from Jose Hunt MD sent at 4/21/2024  4:09 PM EDT -----  Same thyroid dose is no change.  Kidney and liver function in 6 CBC all normal.  Cholesterol elevated as approximately the same but has trouble with statin drugs so just follow    Please call

## 2024-05-06 ENCOUNTER — TELEPHONE (OUTPATIENT)
Dept: FAMILY MEDICINE CLINIC | Age: 89
End: 2024-05-06

## 2024-05-06 DIAGNOSIS — N31.9 NEUROGENIC BLADDER: Primary | ICD-10-CM

## 2024-05-06 NOTE — TELEPHONE ENCOUNTER
Pt called req to start back Myrbetriq.    Pt states she wants to start back on the RX.    Edel Mancia

## 2024-05-21 ENCOUNTER — HOSPITAL ENCOUNTER (INPATIENT)
Age: 89
LOS: 3 days | End: 2024-05-24
Admitting: SURGERY
Payer: MEDICARE

## 2024-05-21 ENCOUNTER — APPOINTMENT (OUTPATIENT)
Dept: GENERAL RADIOLOGY | Age: 89
End: 2024-05-21
Payer: MEDICARE

## 2024-05-21 ENCOUNTER — APPOINTMENT (OUTPATIENT)
Dept: CT IMAGING | Age: 89
End: 2024-05-21
Payer: MEDICARE

## 2024-05-21 DIAGNOSIS — S32.592A CLOSED FRACTURE OF MULTIPLE PUBIC RAMI, LEFT, INITIAL ENCOUNTER (HCC): ICD-10-CM

## 2024-05-21 DIAGNOSIS — S22.009A CLOSED FRACTURE OF MULTIPLE THORACIC VERTEBRAE, INITIAL ENCOUNTER (HCC): ICD-10-CM

## 2024-05-21 DIAGNOSIS — R29.6 UNWITNESSED FALL: Primary | ICD-10-CM

## 2024-05-21 PROBLEM — S32.82XD MULTIPLE CLOSED FRACTURES OF PELVIS WITHOUT DISRUPTION OF PELVIC RING WITH ROUTINE HEALING: Status: ACTIVE | Noted: 2024-05-21

## 2024-05-21 PROBLEM — S22.000A COMPRESSION FRACTURE OF BODY OF THORACIC VERTEBRA (HCC): Status: ACTIVE | Noted: 2024-05-21

## 2024-05-21 PROBLEM — S32.82XA MULTIPLE FRACTURES OF PELVIS WITHOUT DISRUPTION OF PELVIC RING, INITIAL ENCOUNTER FOR CLOSED FRACTURE (HCC): Status: ACTIVE | Noted: 2024-05-21

## 2024-05-21 PROBLEM — S22.32XD CLOSED FRACTURE OF ONE RIB OF LEFT SIDE WITH ROUTINE HEALING: Status: ACTIVE | Noted: 2024-05-21

## 2024-05-21 PROBLEM — S22.31XD CLOSED FRACTURE OF ONE RIB OF RIGHT SIDE WITH ROUTINE HEALING: Status: ACTIVE | Noted: 2024-05-21

## 2024-05-21 LAB
ALBUMIN SERPL BCG-MCNC: 3.8 G/DL (ref 3.5–5.1)
ALP SERPL-CCNC: 70 U/L (ref 38–126)
ALT SERPL W/O P-5'-P-CCNC: 24 U/L (ref 11–66)
ANION GAP SERPL CALC-SCNC: 15 MEQ/L (ref 8–16)
APTT PPP: 24.7 SECONDS (ref 22–38)
AST SERPL-CCNC: 49 U/L (ref 5–40)
BASOPHILS ABSOLUTE: 0 THOU/MM3 (ref 0–0.1)
BASOPHILS NFR BLD AUTO: 0.1 %
BILIRUB SERPL-MCNC: 0.7 MG/DL (ref 0.3–1.2)
BUN SERPL-MCNC: 41 MG/DL (ref 7–22)
CALCIUM SERPL-MCNC: 9.4 MG/DL (ref 8.5–10.5)
CHLORIDE SERPL-SCNC: 103 MEQ/L (ref 98–111)
CK SERPL-CCNC: 1147 U/L (ref 30–135)
CK SERPL-CCNC: 924 U/L (ref 30–135)
CO2 SERPL-SCNC: 22 MEQ/L (ref 23–33)
CREAT SERPL-MCNC: 1.2 MG/DL (ref 0.4–1.2)
DEPRECATED RDW RBC AUTO: 47.6 FL (ref 35–45)
EKG ATRIAL RATE: 95 BPM
EKG P AXIS: 77 DEGREES
EKG P-R INTERVAL: 152 MS
EKG Q-T INTERVAL: 390 MS
EKG QRS DURATION: 96 MS
EKG QTC CALCULATION (BAZETT): 490 MS
EKG R AXIS: -69 DEGREES
EKG T AXIS: 84 DEGREES
EKG VENTRICULAR RATE: 95 BPM
EOSINOPHIL NFR BLD AUTO: 0 %
EOSINOPHILS ABSOLUTE: 0 THOU/MM3 (ref 0–0.4)
ERYTHROCYTE [DISTWIDTH] IN BLOOD BY AUTOMATED COUNT: 14.2 % (ref 11.5–14.5)
GFR SERPL CREATININE-BSD FRML MDRD: 42 ML/MIN/1.73M2
GLUCOSE SERPL-MCNC: 134 MG/DL (ref 70–108)
HCT VFR BLD AUTO: 28.9 % (ref 37–47)
HGB BLD-MCNC: 9.6 GM/DL (ref 12–16)
IMM GRANULOCYTES # BLD AUTO: 0.07 THOU/MM3 (ref 0–0.07)
IMM GRANULOCYTES NFR BLD AUTO: 0.7 %
INR PPP: 0.98 (ref 0.85–1.13)
LYMPHOCYTES ABSOLUTE: 0.5 THOU/MM3 (ref 1–4.8)
LYMPHOCYTES NFR BLD AUTO: 4.6 %
MCH RBC QN AUTO: 30.7 PG (ref 26–33)
MCHC RBC AUTO-ENTMCNC: 33.2 GM/DL (ref 32.2–35.5)
MCV RBC AUTO: 92.3 FL (ref 81–99)
MONOCYTES ABSOLUTE: 0.5 THOU/MM3 (ref 0.4–1.3)
MONOCYTES NFR BLD AUTO: 5.2 %
NEUTROPHILS ABSOLUTE: 8.9 THOU/MM3 (ref 1.8–7.7)
NEUTROPHILS NFR BLD AUTO: 89.4 %
NRBC BLD AUTO-RTO: 0 /100 WBC
OSMOLALITY SERPL CALC.SUM OF ELEC: 291.5 MOSMOL/KG (ref 275–300)
PLATELET # BLD AUTO: 143 THOU/MM3 (ref 130–400)
PMV BLD AUTO: 10.8 FL (ref 9.4–12.4)
POTASSIUM SERPL-SCNC: 4.5 MEQ/L (ref 3.5–5.2)
PROT SERPL-MCNC: 6.9 G/DL (ref 6.1–8)
RBC # BLD AUTO: 3.13 MILL/MM3 (ref 4.2–5.4)
SODIUM SERPL-SCNC: 140 MEQ/L (ref 135–145)
TROPONIN, HIGH SENSITIVITY: 34 NG/L (ref 0–12)
TROPONIN, HIGH SENSITIVITY: 35 NG/L (ref 0–12)
TROPONIN, HIGH SENSITIVITY: 37 NG/L (ref 0–12)
TROPONIN, HIGH SENSITIVITY: 38 NG/L (ref 0–12)
WBC # BLD AUTO: 10 THOU/MM3 (ref 4.8–10.8)

## 2024-05-21 PROCEDURE — 72125 CT NECK SPINE W/O DYE: CPT

## 2024-05-21 PROCEDURE — 82550 ASSAY OF CK (CPK): CPT

## 2024-05-21 PROCEDURE — 73030 X-RAY EXAM OF SHOULDER: CPT

## 2024-05-21 PROCEDURE — 73130 X-RAY EXAM OF HAND: CPT

## 2024-05-21 PROCEDURE — 1200000003 HC TELEMETRY R&B

## 2024-05-21 PROCEDURE — 84484 ASSAY OF TROPONIN QUANT: CPT

## 2024-05-21 PROCEDURE — 70450 CT HEAD/BRAIN W/O DYE: CPT

## 2024-05-21 PROCEDURE — 85025 COMPLETE CBC W/AUTO DIFF WBC: CPT

## 2024-05-21 PROCEDURE — 71045 X-RAY EXAM CHEST 1 VIEW: CPT

## 2024-05-21 PROCEDURE — 94010 BREATHING CAPACITY TEST: CPT

## 2024-05-21 PROCEDURE — 71260 CT THORAX DX C+: CPT

## 2024-05-21 PROCEDURE — 85730 THROMBOPLASTIN TIME PARTIAL: CPT

## 2024-05-21 PROCEDURE — 80053 COMPREHEN METABOLIC PANEL: CPT

## 2024-05-21 PROCEDURE — 96374 THER/PROPH/DIAG INJ IV PUSH: CPT

## 2024-05-21 PROCEDURE — 36415 COLL VENOUS BLD VENIPUNCTURE: CPT

## 2024-05-21 PROCEDURE — 83874 ASSAY OF MYOGLOBIN: CPT

## 2024-05-21 PROCEDURE — 93005 ELECTROCARDIOGRAM TRACING: CPT

## 2024-05-21 PROCEDURE — 94799 UNLISTED PULMONARY SVC/PX: CPT

## 2024-05-21 PROCEDURE — 85610 PROTHROMBIN TIME: CPT

## 2024-05-21 PROCEDURE — 99222 1ST HOSP IP/OBS MODERATE 55: CPT | Performed by: SURGERY

## 2024-05-21 PROCEDURE — 2580000003 HC RX 258

## 2024-05-21 PROCEDURE — 2580000003 HC RX 258: Performed by: NURSE PRACTITIONER

## 2024-05-21 PROCEDURE — 76376 3D RENDER W/INTRP POSTPROCES: CPT

## 2024-05-21 PROCEDURE — 74177 CT ABD & PELVIS W/CONTRAST: CPT

## 2024-05-21 PROCEDURE — 99285 EMERGENCY DEPT VISIT HI MDM: CPT

## 2024-05-21 PROCEDURE — 73564 X-RAY EXAM KNEE 4 OR MORE: CPT

## 2024-05-21 PROCEDURE — 6820000001 HC L2 TRAUMA SURGERY EVALUATION: Performed by: SURGERY

## 2024-05-21 PROCEDURE — 6360000002 HC RX W HCPCS

## 2024-05-21 PROCEDURE — 73090 X-RAY EXAM OF FOREARM: CPT

## 2024-05-21 PROCEDURE — 6360000004 HC RX CONTRAST MEDICATION

## 2024-05-21 RX ORDER — SODIUM CHLORIDE 0.9 % (FLUSH) 0.9 %
5-40 SYRINGE (ML) INJECTION EVERY 12 HOURS SCHEDULED
Status: DISCONTINUED | OUTPATIENT
Start: 2024-05-21 | End: 2024-05-24 | Stop reason: HOSPADM

## 2024-05-21 RX ORDER — LEVOTHYROXINE SODIUM 0.05 MG/1
50 TABLET ORAL DAILY
Status: DISCONTINUED | OUTPATIENT
Start: 2024-05-22 | End: 2024-05-24 | Stop reason: HOSPADM

## 2024-05-21 RX ORDER — LORAZEPAM 1 MG/1
0.5 TABLET ORAL EVERY 6 HOURS PRN
Status: DISCONTINUED | OUTPATIENT
Start: 2024-05-21 | End: 2024-05-24 | Stop reason: HOSPADM

## 2024-05-21 RX ORDER — POTASSIUM CHLORIDE 7.45 MG/ML
10 INJECTION INTRAVENOUS PRN
Status: DISCONTINUED | OUTPATIENT
Start: 2024-05-21 | End: 2024-05-21

## 2024-05-21 RX ORDER — MAGNESIUM SULFATE IN WATER 40 MG/ML
2000 INJECTION, SOLUTION INTRAVENOUS PRN
Status: DISCONTINUED | OUTPATIENT
Start: 2024-05-21 | End: 2024-05-21

## 2024-05-21 RX ORDER — PANTOPRAZOLE SODIUM 40 MG/1
40 TABLET, DELAYED RELEASE ORAL
Status: DISCONTINUED | OUTPATIENT
Start: 2024-05-22 | End: 2024-05-24 | Stop reason: HOSPADM

## 2024-05-21 RX ORDER — LIDOCAINE 4 G/G
1 PATCH TOPICAL DAILY
Status: DISCONTINUED | OUTPATIENT
Start: 2024-05-21 | End: 2024-05-24 | Stop reason: HOSPADM

## 2024-05-21 RX ORDER — BISACODYL 5 MG/1
5 TABLET, DELAYED RELEASE ORAL DAILY PRN
Status: DISCONTINUED | OUTPATIENT
Start: 2024-05-21 | End: 2024-05-24 | Stop reason: HOSPADM

## 2024-05-21 RX ORDER — HEPARIN SODIUM 5000 [USP'U]/ML
5000 INJECTION, SOLUTION INTRAVENOUS; SUBCUTANEOUS 2 TIMES DAILY
Status: DISCONTINUED | OUTPATIENT
Start: 2024-05-22 | End: 2024-05-24

## 2024-05-21 RX ORDER — POLYETHYLENE GLYCOL 3350 17 G/17G
17 POWDER, FOR SOLUTION ORAL DAILY
Status: DISCONTINUED | OUTPATIENT
Start: 2024-05-22 | End: 2024-05-23

## 2024-05-21 RX ORDER — ACETAMINOPHEN 325 MG/1
650 TABLET ORAL EVERY 6 HOURS
Status: DISCONTINUED | OUTPATIENT
Start: 2024-05-21 | End: 2024-05-24 | Stop reason: HOSPADM

## 2024-05-21 RX ORDER — GABAPENTIN 300 MG/1
300 CAPSULE ORAL NIGHTLY
Status: DISCONTINUED | OUTPATIENT
Start: 2024-05-21 | End: 2024-05-24

## 2024-05-21 RX ORDER — ENOXAPARIN SODIUM 100 MG/ML
40 INJECTION SUBCUTANEOUS DAILY
Status: DISCONTINUED | OUTPATIENT
Start: 2024-05-22 | End: 2024-05-21 | Stop reason: ALTCHOICE

## 2024-05-21 RX ORDER — POTASSIUM CHLORIDE 29.8 MG/ML
20 INJECTION INTRAVENOUS PRN
Status: DISCONTINUED | OUTPATIENT
Start: 2024-05-21 | End: 2024-05-21

## 2024-05-21 RX ORDER — TRAMADOL HYDROCHLORIDE 50 MG/1
100 TABLET ORAL EVERY 8 HOURS PRN
COMMUNITY

## 2024-05-21 RX ORDER — ONDANSETRON 2 MG/ML
4 INJECTION INTRAMUSCULAR; INTRAVENOUS EVERY 6 HOURS PRN
Status: DISCONTINUED | OUTPATIENT
Start: 2024-05-21 | End: 2024-05-24 | Stop reason: HOSPADM

## 2024-05-21 RX ORDER — SODIUM CHLORIDE 9 MG/ML
INJECTION, SOLUTION INTRAVENOUS CONTINUOUS
Status: DISCONTINUED | OUTPATIENT
Start: 2024-05-21 | End: 2024-05-24 | Stop reason: HOSPADM

## 2024-05-21 RX ORDER — PAROXETINE HYDROCHLORIDE 20 MG/1
10 TABLET, FILM COATED ORAL DAILY
Status: DISCONTINUED | OUTPATIENT
Start: 2024-05-22 | End: 2024-05-24 | Stop reason: HOSPADM

## 2024-05-21 RX ORDER — ONDANSETRON 4 MG/1
4 TABLET, ORALLY DISINTEGRATING ORAL EVERY 8 HOURS PRN
Status: DISCONTINUED | OUTPATIENT
Start: 2024-05-21 | End: 2024-05-24 | Stop reason: DRUGHIGH

## 2024-05-21 RX ORDER — 0.9 % SODIUM CHLORIDE 0.9 %
1000 INTRAVENOUS SOLUTION INTRAVENOUS ONCE
Status: COMPLETED | OUTPATIENT
Start: 2024-05-21 | End: 2024-05-21

## 2024-05-21 RX ORDER — TRAMADOL HYDROCHLORIDE 50 MG/1
50 TABLET ORAL EVERY 6 HOURS PRN
Status: DISCONTINUED | OUTPATIENT
Start: 2024-05-21 | End: 2024-05-24 | Stop reason: HOSPADM

## 2024-05-21 RX ORDER — MORPHINE SULFATE 2 MG/ML
2 INJECTION, SOLUTION INTRAMUSCULAR; INTRAVENOUS
Status: DISCONTINUED | OUTPATIENT
Start: 2024-05-21 | End: 2024-05-24 | Stop reason: ALTCHOICE

## 2024-05-21 RX ORDER — SODIUM CHLORIDE 0.9 % (FLUSH) 0.9 %
5-40 SYRINGE (ML) INJECTION PRN
Status: DISCONTINUED | OUTPATIENT
Start: 2024-05-21 | End: 2024-05-24 | Stop reason: HOSPADM

## 2024-05-21 RX ORDER — FENTANYL CITRATE 50 UG/ML
25 INJECTION, SOLUTION INTRAMUSCULAR; INTRAVENOUS ONCE
Status: COMPLETED | OUTPATIENT
Start: 2024-05-21 | End: 2024-05-21

## 2024-05-21 RX ORDER — BISACODYL 10 MG
10 SUPPOSITORY, RECTAL RECTAL DAILY PRN
Status: DISCONTINUED | OUTPATIENT
Start: 2024-05-21 | End: 2024-05-24 | Stop reason: HOSPADM

## 2024-05-21 RX ORDER — IPRATROPIUM BROMIDE 21 UG/1
2 SPRAY, METERED NASAL EVERY 12 HOURS
Status: DISCONTINUED | OUTPATIENT
Start: 2024-05-21 | End: 2024-05-21

## 2024-05-21 RX ORDER — SUCRALFATE 1 G/1
1 TABLET ORAL 4 TIMES DAILY
Status: DISCONTINUED | OUTPATIENT
Start: 2024-05-21 | End: 2024-05-21

## 2024-05-21 RX ORDER — METHOCARBAMOL 500 MG/1
1000 TABLET, FILM COATED ORAL 4 TIMES DAILY
Status: DISCONTINUED | OUTPATIENT
Start: 2024-05-21 | End: 2024-05-24 | Stop reason: HOSPADM

## 2024-05-21 RX ORDER — SODIUM CHLORIDE 9 MG/ML
INJECTION, SOLUTION INTRAVENOUS PRN
Status: DISCONTINUED | OUTPATIENT
Start: 2024-05-21 | End: 2024-05-24 | Stop reason: HOSPADM

## 2024-05-21 RX ADMIN — SODIUM CHLORIDE: 9 INJECTION, SOLUTION INTRAVENOUS at 23:38

## 2024-05-21 RX ADMIN — SODIUM CHLORIDE, PRESERVATIVE FREE 10 ML: 5 INJECTION INTRAVENOUS at 23:43

## 2024-05-21 RX ADMIN — IOPAMIDOL 80 ML: 755 INJECTION, SOLUTION INTRAVENOUS at 15:24

## 2024-05-21 RX ADMIN — FENTANYL CITRATE 25 MCG: 50 INJECTION INTRAMUSCULAR; INTRAVENOUS at 17:10

## 2024-05-21 RX ADMIN — SODIUM CHLORIDE 1000 ML: 9 INJECTION, SOLUTION INTRAVENOUS at 15:15

## 2024-05-21 ASSESSMENT — ENCOUNTER SYMPTOMS
VOMITING: 0
BACK PAIN: 0
CONSTIPATION: 0
RHINORRHEA: 0
NAUSEA: 0
ABDOMINAL PAIN: 0
SHORTNESS OF BREATH: 0
SORE THROAT: 0
TROUBLE SWALLOWING: 0
DIARRHEA: 0

## 2024-05-21 ASSESSMENT — PAIN - FUNCTIONAL ASSESSMENT
PAIN_FUNCTIONAL_ASSESSMENT: NONE - DENIES PAIN

## 2024-05-21 ASSESSMENT — LIFESTYLE VARIABLES: HOW OFTEN DO YOU HAVE A DRINK CONTAINING ALCOHOL: NEVER

## 2024-05-21 NOTE — ED NOTES
Pt resting in cot with family at bedside. Pt denies pain. Pt family educated room is waiting to be cleaned.

## 2024-05-21 NOTE — ED NOTES
Pt resting on cot. Provider at bedside. Call light within reach. Respirations even and unlabored. VSS.

## 2024-05-21 NOTE — H&P
I have independently performed an evaluation on Leticia . I have reviewed the above documentation completed by the NP,Kenna   Italicized font, if present, represents changes to the note made by me.    Time spent with patient 45 minutes.  Time could have been discontiguous.  Time does not include procedures.  Time does include my direct assessment of the patient and coordination of care.  Time represents more than 50% of the time involved with patient care by the surgical/tauma team.        Elderly female who is nursing home bound was found by her daughter.  Patient was worked up in the emergency department and found to have multiple pelvic Fractures.  Rhabdomyolysis.  Elevated troponin and urinary retention.  She was admitted to the trauma service.  Started on fluids will need to medicine consult.  Fluids for her rhabdo.  Orthopedic consult.  Pain control.  You are a tested to ensure no underlying UTI.    Electronically signed by Raya Wilkes MD on 5/22/2024 at 9:53 AM    Trauma H&P     Patient:  Leticia Eason  Admit date: 5/21/2024   YOB: 1931 Date of Evaluation: 5/21/2024  MRN: [id number]  Acct: [id number]    Injury Date:05/21/24  Injury time: 12 hours ago  PCP: Jose Hunt MD   Referring physician: Dr Monroy    Time of Trauma Surgeon Notification:  1618  Time of Trauma JYOTI Arrival: 1628  Time of Trauma Surgeon Arrival:  2035  Services Requested Within 30 Minutes: N/A Time Contacted:    Assessment:    Trauma s/p fall   Multiple pelvic fractures without disruption of pelvic ring   Thoracic compression fractures  Lumbar compression fractures   Confusion   Constipation  Urinary retention  Elevated troponin   Rhabdomyolysis   Hypothyroidism  Anxiety  Chronic back pain    Plan:    Trauma s/p fall    - admit to trauma services on tele     Multiple pelvic fractures without disruption of pelvic ring    - per imaging, comminuted mildly displaced fracture of the left superior and inferior  (NEURONTIN) 300 MG CAPSULE    Take 1 capsule by mouth nightly for 180 days.    LEVOTHYROXINE (SYNTHROID) 50 MCG TABLET    take 1 tablet by mouth once daily    LORAZEPAM (ATIVAN) 0.5 MG TABLET    One tab po every 6 hours prn anxiety    MIRABEGRON (MYRBETRIQ) 25 MG TB24    Take 1 tablet by mouth daily    MULTIPLE VITAMINS-MINERALS (PRESERVISION/LUTEIN) CAPS    Take 1 capsule by mouth 2 times daily.      PANTOPRAZOLE (PROTONIX) 40 MG TABLET    take 1 tablet by mouth twice a day 30 MINUTES PRIOR TO BREAKFAST AND DINNER    PAROXETINE (PAXIL) 10 MG TABLET    take 1 tablet by mouth once daily    TRAMADOL (ULTRAM) 50 MG TABLET    Take 2 tablets by mouth every 8 hours as needed for Pain.       Hospital medications:  Scheduled Meds:  Continuous Infusions:  PRN Meds:  Objective   ED TRIAGE VITALS  BP: (!) 159/96, Temp: 98.4 °F (36.9 °C), Pulse: (!) 105, Respirations: 18, SpO2: 98 %  Edgar Coma Scale  Eye Opening: Spontaneous  Best Verbal Response: Confused  Best Motor Response: Obeys commands  Edgar Coma Scale Score: 14  Results for orders placed or performed during the hospital encounter of 05/21/24   CBC with Auto Differential   Result Value Ref Range    WBC 10.0 4.8 - 10.8 thou/mm3    RBC 3.13 (L) 4.20 - 5.40 mill/mm3    Hemoglobin 9.6 (L) 12.0 - 16.0 gm/dl    Hematocrit 28.9 (L) 37.0 - 47.0 %    MCV 92.3 81.0 - 99.0 fL    MCH 30.7 26.0 - 33.0 pg    MCHC 33.2 32.2 - 35.5 gm/dl    RDW-CV 14.2 11.5 - 14.5 %    RDW-SD 47.6 (H) 35.0 - 45.0 fL    Platelets 143 130 - 400 thou/mm3    MPV 10.8 9.4 - 12.4 fL    Seg Neutrophils 89.4 %    Lymphocytes 4.6 %    Monocytes % 5.2 %    Eosinophils 0.0 %    Basophils 0.1 %    Immature Granulocytes % 0.7 %    Neutrophils Absolute 8.9 (H) 1.8 - 7.7 thou/mm3    Lymphocytes Absolute 0.5 (L) 1.0 - 4.8 thou/mm3    Monocytes Absolute 0.5 0.4 - 1.3 thou/mm3    Eosinophils Absolute 0.0 0.0 - 0.4 thou/mm3    Basophils Absolute 0.0 0.0 - 0.1 thou/mm3    Immature Grans (Abs) 0.07 0.00 - 0.07

## 2024-05-21 NOTE — ED TRIAGE NOTES
Pt presents to the ED via EMS from home after a fall. Pt is alert to person only. When asked how she fell pt states \"I fell\" Daughter at bedside states pt is normally alert and oriented. Daughter states she found the pt on the ground this morning.

## 2024-05-21 NOTE — ED NOTES
ED to inpatient nurses report    Chief Complaint   Patient presents with    Fall      Present to ED from home  LOC: alert to only name  Vital signs   Vitals:    05/21/24 1447 05/21/24 1513 05/21/24 1659 05/21/24 1842   BP: (!) 146/87 (!) 156/94 (!) 170/81 (!) 159/96   Pulse: 95 89 90 (!) 105   Resp: 23 19 20 18   Temp:       TempSrc:       SpO2: 100% 100% 96% 98%      Oxygen Baseline 100    Current needs required ra Bipap/Cpap No  LDAs:   Peripheral IV 05/21/24 Left Antecubital (Active)   Site Assessment Clean, dry & intact 05/21/24 1400   Dressing Status Clean, dry & intact 05/21/24 1400     Mobility: Fully dependent  Pending ED orders: none  Present condition: stable    C-SSRS    Swallow Screening    Preferred Language: English     Electronically signed by YAMILEX CORTES RN on 5/21/2024 at 7:38 PM

## 2024-05-21 NOTE — PROGRESS NOTES
Pharmacy Medication History Note      List of current medications patient is taking is complete.    Source of information: dispense report, patient, patient's daughters     Changes made to medication list:  Medications removed (include reason, ex. therapy complete or physician discontinued):  Ipratropium nasal spray - no longer taking   Sucralfate - no longer taking     Medications added:  Tramadol 50mg tablets - 100mg q8h as needed for pain.    Other notes (ex. Recent course of antibiotics, Coumadin dosing):  Patient did not take any of her medications yet today  Patient normally takes ativan around three times a day   Denies use of other OTC or herbal medications.    Allergies reviewed    Electronically signed by Alcira Harris RPH on 5/21/2024 at 5:35 PM

## 2024-05-21 NOTE — ED PROVIDER NOTES
concern for intracranial pathology at this time.  Symptoms may be due to electric derangement versus infectious process         Case discussed with consulting clinician:  Trauma service         Shared Decision-Making was performed and disposition discussed with the        Patient/Family and questions answered             Summary of Patient Presentation:      [facility]    ED Course as of 05/21/24 1808   Tue May 21, 2024   1355 Patient arrives via EMS s/p fall.  History obtained from patient's daughter.  Patient typically is AOx4 however arrives alert and oriented only to herself.  She is given a GCS of 14 with no obvious hematoma or laceration to the scalp.  Therefore decision was made to not call a level 2 trauma as she did not meet criteria.  She does have obvious traumatic injuries. Not on thinners. Will tran scan patient as well as obtain lab work. [NG]   1417 Hemoglobin 9.6 down from 12.4 one-month ago.  Patient's baseline appears to be 10-11, scanning for possible hemorrhage due to fall.  Patient not on any blood thinners per daughter [NG]   1442 Reevaluated patient.  She unfortunately still not received her CT scans however she is maintaining a GCS of 14 with no further decline [NG]   1506 Concern for rhabdo given elevated CK as well as being found on ground, fluid bolus ordered [NG]   1513 EKG sinus rhythm, left axis deviation noted, significant motion artifact however no specific ST changes noted, initial high-sensitivity troponin elevated 35, will evaluate 2-hour delta [NG]   1600 CMP patient's baseline [NG]   1600 Coags WNL [NG]   1604 L shoulder x-ray:  IMPRESSION:  No acute fracture or dislocation. [NG]   1604 L radius/ulna fracture:  IMPRESSION:  No fracture or dislocation. [NG]   1605 L knee x-ray:  IMPRESSION:  No fracture or dislocation. [NG]   1605 CXR [NG]   1605 CXR:  IMPRESSION:  No acute intrathoracic process [NG]   1605 CTB:  IMPRESSION:   No evidence of an acute process. [NG]   1606 CT  Chest:  IMPRESSION:  1. No acute intrathoracic findings.  2. Small stable right midlung nodule. [NG]   1607 CT abd/pelvis:  IMPRESSION:  1. Comminuted mildly displaced fractures of the left superior and inferior pubic  rami are noted. A nondisplaced fracture of the left iliac bone near the left  sacroiliac joint is also observed. Intramuscular edema is noted in the left  proximal adductor muscle compartment. No discrete fluid collection is  identified. Intramuscular edema is also noted along the left pelvic sidewall  musculature. No free fluid or fluid collections are noted within the pelvis. A  possible nondisplaced left lateral sixth rib fracture is incompletely visualized  on the limited images through the lung bases. Please see the CT chest and the  additional CT examinations performed the same day and dictated separately for  additional assessment.  2. No solid organ injury is identified. The kidneys appear heterogeneous in  attenuation, similar to the prior examination. The urinary bladder is mildly  distended which may indicate urinary retention. No obstructive uropathy is  visualized.  3. Moderate retained fecal material is seen within the rectosigmoid colon  suggesting constipation. Correlate clinically for fecal impaction. No bowel  obstruction is identified. Chronic findings are discussed. [NG]   1607 CT C-spine:  IMPRESSION:  1. Cervical spondylosis at C5-6 and C6-7. Diffuse osteopenia.  2. No acute fracture noted. [NG]   1608 Independent review of CT lumbar spine appears to show compression fractures [NG]   1615 Spoke to trauma service.  They will evaluate patient. [NG]   1655 Patient admitted to trauma service.  Family updated.  All questions answered to their satisfaction [NG]   1704 Was approached by RN requesting pain medication as patient is to have brief changed.  Will provide small dose of fentanyl [NG]   1705 CT T-spine:  IMPRESSION:  1. Diffuse osteopenia and kyphosis.  2. Compression fractures

## 2024-05-22 ENCOUNTER — APPOINTMENT (OUTPATIENT)
Dept: MRI IMAGING | Age: 89
End: 2024-05-22
Payer: MEDICARE

## 2024-05-22 ENCOUNTER — APPOINTMENT (OUTPATIENT)
Dept: GENERAL RADIOLOGY | Age: 89
End: 2024-05-22
Payer: MEDICARE

## 2024-05-22 PROBLEM — E43 SEVERE MALNUTRITION (HCC): Status: ACTIVE | Noted: 2024-05-22

## 2024-05-22 LAB
ANION GAP SERPL CALC-SCNC: 13 MEQ/L (ref 8–16)
BACTERIA URNS QL MICRO: ABNORMAL /HPF
BASOPHILS ABSOLUTE: 0 THOU/MM3 (ref 0–0.1)
BASOPHILS NFR BLD AUTO: 0.1 %
BILIRUB UR QL STRIP.AUTO: NEGATIVE
BUN SERPL-MCNC: 39 MG/DL (ref 7–22)
CALCIUM SERPL-MCNC: 8.3 MG/DL (ref 8.5–10.5)
CASTS #/AREA URNS LPF: ABNORMAL /LPF
CASTS 2: ABNORMAL /LPF
CHARACTER UR: CLEAR
CHLORIDE SERPL-SCNC: 109 MEQ/L (ref 98–111)
CK SERPL-CCNC: 866 U/L (ref 30–135)
CO2 SERPL-SCNC: 21 MEQ/L (ref 23–33)
COLOR: YELLOW
CREAT SERPL-MCNC: 0.9 MG/DL (ref 0.4–1.2)
CRYSTALS URNS MICRO: ABNORMAL
DEPRECATED RDW RBC AUTO: 47.8 FL (ref 35–45)
EKG ATRIAL RATE: 86 BPM
EKG P AXIS: 42 DEGREES
EKG P-R INTERVAL: 128 MS
EKG Q-T INTERVAL: 372 MS
EKG QRS DURATION: 98 MS
EKG QTC CALCULATION (BAZETT): 445 MS
EKG R AXIS: -53 DEGREES
EKG T AXIS: 86 DEGREES
EKG VENTRICULAR RATE: 86 BPM
EOSINOPHIL NFR BLD AUTO: 0 %
EOSINOPHILS ABSOLUTE: 0 THOU/MM3 (ref 0–0.4)
EPITHELIAL CELLS, UA: ABNORMAL /HPF
ERYTHROCYTE [DISTWIDTH] IN BLOOD BY AUTOMATED COUNT: 14.4 % (ref 11.5–14.5)
GFR SERPL CREATININE-BSD FRML MDRD: 60 ML/MIN/1.73M2
GLUCOSE SERPL-MCNC: 99 MG/DL (ref 70–108)
GLUCOSE UR QL STRIP.AUTO: NEGATIVE MG/DL
HCT VFR BLD AUTO: 22.7 % (ref 37–47)
HGB BLD-MCNC: 7.6 GM/DL (ref 12–16)
HGB UR QL STRIP.AUTO: NEGATIVE
IMM GRANULOCYTES # BLD AUTO: 0.04 THOU/MM3 (ref 0–0.07)
IMM GRANULOCYTES NFR BLD AUTO: 0.5 %
KETONES UR QL STRIP.AUTO: 15
LYMPHOCYTES ABSOLUTE: 0.8 THOU/MM3 (ref 1–4.8)
LYMPHOCYTES NFR BLD AUTO: 10.9 %
MCH RBC QN AUTO: 31 PG (ref 26–33)
MCHC RBC AUTO-ENTMCNC: 33.5 GM/DL (ref 32.2–35.5)
MCV RBC AUTO: 92.7 FL (ref 81–99)
MISCELLANEOUS 2: ABNORMAL
MONOCYTES ABSOLUTE: 0.4 THOU/MM3 (ref 0.4–1.3)
MONOCYTES NFR BLD AUTO: 5.9 %
NEUTROPHILS ABSOLUTE: 6.3 THOU/MM3 (ref 1.8–7.7)
NEUTROPHILS NFR BLD AUTO: 82.6 %
NITRITE UR QL STRIP: NEGATIVE
NRBC BLD AUTO-RTO: 0 /100 WBC
PH UR STRIP.AUTO: 7 [PH] (ref 5–9)
PLATELET # BLD AUTO: 111 THOU/MM3 (ref 130–400)
PMV BLD AUTO: 11 FL (ref 9.4–12.4)
POTASSIUM SERPL-SCNC: 3.6 MEQ/L (ref 3.5–5.2)
PROT UR STRIP.AUTO-MCNC: 30 MG/DL
RBC # BLD AUTO: 2.45 MILL/MM3 (ref 4.2–5.4)
RBC URINE: ABNORMAL /HPF
RENAL EPI CELLS #/AREA URNS HPF: ABNORMAL /[HPF]
SODIUM SERPL-SCNC: 143 MEQ/L (ref 135–145)
SP GR UR REFRACT.AUTO: > 1.03 (ref 1–1.03)
TROPONIN, HIGH SENSITIVITY: 37 NG/L (ref 0–12)
TROPONIN, HIGH SENSITIVITY: 40 NG/L (ref 0–12)
UROBILINOGEN, URINE: 0.2 EU/DL (ref 0–1)
WBC # BLD AUTO: 7.6 THOU/MM3 (ref 4.8–10.8)
WBC #/AREA URNS HPF: ABNORMAL /HPF
WBC #/AREA URNS HPF: ABNORMAL /[HPF]
YEAST LIKE FUNGI URNS QL MICRO: ABNORMAL

## 2024-05-22 PROCEDURE — 92523 SPEECH SOUND LANG COMPREHEN: CPT

## 2024-05-22 PROCEDURE — 1200000003 HC TELEMETRY R&B

## 2024-05-22 PROCEDURE — 84484 ASSAY OF TROPONIN QUANT: CPT

## 2024-05-22 PROCEDURE — 81001 URINALYSIS AUTO W/SCOPE: CPT

## 2024-05-22 PROCEDURE — 94799 UNLISTED PULMONARY SVC/PX: CPT

## 2024-05-22 PROCEDURE — 94010 BREATHING CAPACITY TEST: CPT

## 2024-05-22 PROCEDURE — 70551 MRI BRAIN STEM W/O DYE: CPT

## 2024-05-22 PROCEDURE — 97129 THER IVNTJ 1ST 15 MIN: CPT

## 2024-05-22 PROCEDURE — 71045 X-RAY EXAM CHEST 1 VIEW: CPT

## 2024-05-22 PROCEDURE — 80048 BASIC METABOLIC PNL TOTAL CA: CPT

## 2024-05-22 PROCEDURE — 97530 THERAPEUTIC ACTIVITIES: CPT

## 2024-05-22 PROCEDURE — 6360000002 HC RX W HCPCS: Performed by: NURSE PRACTITIONER

## 2024-05-22 PROCEDURE — 87086 URINE CULTURE/COLONY COUNT: CPT

## 2024-05-22 PROCEDURE — 72146 MRI CHEST SPINE W/O DYE: CPT

## 2024-05-22 PROCEDURE — 6370000000 HC RX 637 (ALT 250 FOR IP): Performed by: NURSE PRACTITIONER

## 2024-05-22 PROCEDURE — 99221 1ST HOSP IP/OBS SF/LOW 40: CPT | Performed by: STUDENT IN AN ORGANIZED HEALTH CARE EDUCATION/TRAINING PROGRAM

## 2024-05-22 PROCEDURE — 82550 ASSAY OF CK (CPK): CPT

## 2024-05-22 PROCEDURE — 97162 PT EVAL MOD COMPLEX 30 MIN: CPT

## 2024-05-22 PROCEDURE — 85025 COMPLETE CBC W/AUTO DIFF WBC: CPT

## 2024-05-22 PROCEDURE — 92610 EVALUATE SWALLOWING FUNCTION: CPT

## 2024-05-22 PROCEDURE — 36415 COLL VENOUS BLD VENIPUNCTURE: CPT

## 2024-05-22 PROCEDURE — 93005 ELECTROCARDIOGRAM TRACING: CPT | Performed by: NURSE PRACTITIONER

## 2024-05-22 RX ORDER — DOCUSATE SODIUM 100 MG/1
100 CAPSULE, LIQUID FILLED ORAL 2 TIMES DAILY
Status: DISCONTINUED | OUTPATIENT
Start: 2024-05-22 | End: 2024-05-24 | Stop reason: HOSPADM

## 2024-05-22 RX ORDER — ENEMA 19; 7 G/133ML; G/133ML
1 ENEMA RECTAL
Status: ACTIVE | OUTPATIENT
Start: 2024-05-22 | End: 2024-05-23

## 2024-05-22 RX ORDER — SENNOSIDES A AND B 8.6 MG/1
2 TABLET, FILM COATED ORAL NIGHTLY
Status: DISCONTINUED | OUTPATIENT
Start: 2024-05-22 | End: 2024-05-24 | Stop reason: HOSPADM

## 2024-05-22 RX ORDER — CIPROFLOXACIN 2 MG/ML
400 INJECTION, SOLUTION INTRAVENOUS EVERY 12 HOURS
Status: DISCONTINUED | OUTPATIENT
Start: 2024-05-22 | End: 2024-05-24

## 2024-05-22 RX ORDER — LACTULOSE 10 G/15ML
20 SOLUTION ORAL 3 TIMES DAILY
Status: DISCONTINUED | OUTPATIENT
Start: 2024-05-22 | End: 2024-05-24 | Stop reason: HOSPADM

## 2024-05-22 RX ADMIN — GABAPENTIN 300 MG: 300 CAPSULE ORAL at 21:45

## 2024-05-22 RX ADMIN — LEVOTHYROXINE SODIUM 50 MCG: 0.05 TABLET ORAL at 09:30

## 2024-05-22 RX ADMIN — TRAMADOL HYDROCHLORIDE 50 MG: 50 TABLET ORAL at 13:45

## 2024-05-22 RX ADMIN — LACTULOSE 20 G: 20 SOLUTION ORAL at 15:34

## 2024-05-22 RX ADMIN — PANTOPRAZOLE SODIUM 40 MG: 40 TABLET, DELAYED RELEASE ORAL at 09:28

## 2024-05-22 RX ADMIN — POLYETHYLENE GLYCOL 3350 17 G: 17 POWDER, FOR SOLUTION ORAL at 09:28

## 2024-05-22 RX ADMIN — METHOCARBAMOL 1000 MG: 500 TABLET ORAL at 09:28

## 2024-05-22 RX ADMIN — METHOCARBAMOL 1000 MG: 500 TABLET ORAL at 21:45

## 2024-05-22 RX ADMIN — METHOCARBAMOL 1000 MG: 500 TABLET ORAL at 15:49

## 2024-05-22 RX ADMIN — PANTOPRAZOLE SODIUM 40 MG: 40 TABLET, DELAYED RELEASE ORAL at 15:34

## 2024-05-22 RX ADMIN — CIPROFLOXACIN 400 MG: 400 INJECTION, SOLUTION INTRAVENOUS at 15:37

## 2024-05-22 RX ADMIN — LACTULOSE 20 G: 20 SOLUTION ORAL at 21:39

## 2024-05-22 RX ADMIN — ACETAMINOPHEN 650 MG: 325 TABLET ORAL at 15:34

## 2024-05-22 RX ADMIN — ACETAMINOPHEN 650 MG: 325 TABLET ORAL at 01:55

## 2024-05-22 RX ADMIN — HEPARIN SODIUM 5000 UNITS: 5000 INJECTION INTRAVENOUS; SUBCUTANEOUS at 09:28

## 2024-05-22 RX ADMIN — METHOCARBAMOL 1000 MG: 500 TABLET ORAL at 01:56

## 2024-05-22 RX ADMIN — MORPHINE SULFATE 2 MG: 2 INJECTION, SOLUTION INTRAMUSCULAR; INTRAVENOUS at 10:12

## 2024-05-22 RX ADMIN — PAROXETINE HYDROCHLORIDE 10 MG: 20 TABLET, FILM COATED ORAL at 09:28

## 2024-05-22 RX ADMIN — GABAPENTIN 300 MG: 300 CAPSULE ORAL at 01:57

## 2024-05-22 RX ADMIN — DOCUSATE SODIUM 100 MG: 100 CAPSULE, LIQUID FILLED ORAL at 15:35

## 2024-05-22 RX ADMIN — DOCUSATE SODIUM 100 MG: 100 CAPSULE, LIQUID FILLED ORAL at 21:39

## 2024-05-22 RX ADMIN — SENNOSIDES 17.2 MG: 8.6 TABLET, FILM COATED ORAL at 21:39

## 2024-05-22 RX ADMIN — HEPARIN SODIUM 5000 UNITS: 5000 INJECTION INTRAVENOUS; SUBCUTANEOUS at 21:39

## 2024-05-22 RX ADMIN — ACETAMINOPHEN 650 MG: 325 TABLET ORAL at 09:27

## 2024-05-22 RX ADMIN — ACETAMINOPHEN 650 MG: 325 TABLET ORAL at 21:39

## 2024-05-22 ASSESSMENT — PAIN DESCRIPTION - DESCRIPTORS
DESCRIPTORS: ACHING
DESCRIPTORS: ACHING

## 2024-05-22 ASSESSMENT — PAIN DESCRIPTION - LOCATION
LOCATION: BACK;PELVIS
LOCATION: PELVIS
LOCATION: BACK;HEAD

## 2024-05-22 ASSESSMENT — PAIN SCALES - GENERAL
PAINLEVEL_OUTOF10: 4
PAINLEVEL_OUTOF10: 4
PAINLEVEL_OUTOF10: 7
PAINLEVEL_OUTOF10: 3
PAINLEVEL_OUTOF10: 7

## 2024-05-22 ASSESSMENT — PAIN - FUNCTIONAL ASSESSMENT: PAIN_FUNCTIONAL_ASSESSMENT: ACTIVITIES ARE NOT PREVENTED

## 2024-05-22 ASSESSMENT — PAIN DESCRIPTION - ORIENTATION
ORIENTATION: LEFT;MID
ORIENTATION: LOWER

## 2024-05-22 NOTE — Clinical Note
Pt admitted to  {NUMBERS 0-31:09599} via {transports:059538} from {arrival:18022}.  Complaints: {RUSH IP NURSING PT COMPLAINTS:[id number]}.    IV {iv fluids:189807} infusing into the {iv locations:573088} at a rate of {NUMBERS;  BY 25:92896} mls/ hour with about {NUMBERS; 100-1000 :81364} mls in the bag still. IV site free of s/s of infection or infiltration. Vital signs obtained. Assessment and data collection initiated. Two nurse skin assessment performed by *** RN and *** RN. Oriented to room. Policies and procedures for  explained. All questions answered with no further questions at this time. Fall prevention and safety brochure discussed with patient.  Bed alarm on. Call light in reach.Oriented to room.   Anita Morales, RN, RN 5/22/2024 7:37 AM     Explained patients right to have family, representative or physician notified of their admission.  Patient has {requested/declined:[id number]} for physician to be notified.  Patient has {requested/declined:[id number]} for family/representative to be notified.

## 2024-05-22 NOTE — PROGRESS NOTES
Pt admitted to  5K7 via from ED from ED.  Complaints: left hip pain.    IV none infusing into the antecubital left, condition patent and no redness IV site free of s/s of infection or infiltration. Vital signs obtained. Assessment and data collection initiated. Two nurse skin assessment performed by Anita RN and Diane RN. Oriented to room. Policies and procedures for  explained. All questions answered with no further questions at this time. Fall prevention and safety brochure discussed with patient.  Bed alarm on. Call light in reach.Oriented to room.   Anita Morales, RN, RN 5/22/2024 8:15 AM     Explained patients right to have family, representative or physician notified of their admission.  Patient has Requested for physician to be notified.  Patient has Requested for family/representative to be notified.

## 2024-05-22 NOTE — PROGRESS NOTES
degree  Driving Status: Does not drive  Finance Management: Independent  Medication Management: Independent  ADL's: Independent. Has a cleaning lady that comes out to the house every other week; independent use of stove/oven   Hobbies: watching the news, reading, genealogy   Vision Status: Glasses   Hearing: WFL  Type of Home: Apartment  Home Layout: One level  Home Access: Stairs to enter with rails  Entrance Stairs - Number of Steps: 2 KELLY  Entrance Stairs - Rails: Both  Home Equipment: Rollator, Walker - Rolling, Cane    SPEECH / VOICE:  Speech and Voice appear to be grossly intact for basic and complex daily communication    LANGUAGE:  Receptive:  Receptive language skills appear to be grossly intact for basic and complex daily communication.    Expressive:  Confrontational Naming: 3/3 MOCA  Divergent Naming (abstract): x3 wpm (N=11+)  Sentence Formulation: WFL  Conversational Speech: high level anomia   Paraphasias: none noted  Repetition: sentence level: 1/2    COGNITION:  El Cognitive Assessment (MOCA) version 7.2 completed.  Patient scored 16/30.  Normal is greater than or equal to 26/30.  Inclusion of +1 point given highest level of education achieved less than/equal to 12th grade or GED with limited-0 post-secondary schooling     Orientation: 4/6  Immediate Recall: 3/5  Short-Term Recall: 3/5 indep, 1/5 min cues   Divergent Naming: x3 wpm (N=11+)  Reasonin/2  Thought Organization: high level deficits  Attention: impaired   Math Computation: 0/1  Executive Functionin/5    SWALLOWING:    Respiratory Status: Room Air      Behavioral Observation: Alert and cooperative    CRANIAL NERVE ASSESSMENT   CN V (Trigeminal) Closes and Opens Mandible WFL    Rotary Jaw Movement Impaired      CN VII (Facial) Cheeks Hold Food out of Sulci WFL    Opens, Closes/Seals, Protrudes, Retracts Lips Impaired: Bilateral    General Appearance WFL    Sensation Not Tested      CN X (Vagus - Pharyngeal) Raises Back of  Tongue Not Tested      CN XI (Accessory) Lifts Soft Palate WFL      CN XII (Hypoglossal) Elevates Tongue Up and Back WFL    Protrusion   WFL    Lateralizes Tongue WFL    Sensation Not Tested      Other Observations Dentition Intact     Vocal Quality WFL    Cough WFL     PATIENT WAS EVALUATED USING:  Thin Liquids, Puree, and Coarse Solids    ORAL PHASE:  Impaired:  Impaired AP Movement, Impaired Mastication, and Reduced Bolus Formation    PHARYNGEAL PHASE:  Impaired:  Suspected Pharyngeal Residue and Suspected Decreased Airway Protection    SIGNS AND SYMPTOMS OF LARYNGEAL PENETRATION / ASPIRATION:  Throat Clear and Delayed Cough    INSTRUMENTAL EVALUATION: Modified Barium Swallow (MBS)    DIET RECOMMENDATIONS:  NPO     STRATEGIES: Recommend NPO and Strategies pending MBS completion     ASPIRATION PNEUMONIA PREDICTORS:  Decreased Cognition, Limited Mobility, and Chronic Medical Issues/Pertinent Co-Morbidities    FUNCTIONAL ORAL INTAKE SCALE: Functional oral intake testing not appropriate at this time         RECOMMENDATIONS/ASSESSMENT:  DIAGNOSTIC IMPRESSIONS:  Clinical Swallow Evaluation: Patient presents with mild-moderate oral dysphagia with inability to fully discern potential presence of pharyngeal phase deficits without formal instrumentation. Demonstration of impaired mastication with prolonged bolus breakdown/transit leading to decreased oral bolus formation and at least mild oral stasis. Heavy reliance on liquid wash to enhance oral clearance. Concerns for decreased airway protection and/or build up of pharyngeal stasis with multiple swallows present per bolus, presence of throat clearing and delayed cough following consumption of thin liquids via cup. Recommendations for continuation of NPO diet (medications in applesauce) with MBS to further assess and r/o pharyngeal dysfunction. Patient/son agreeable to further instrumental swallowing assessment.     Speech, Language, Cognitive Evaluation: Patient presents

## 2024-05-22 NOTE — PROGRESS NOTES
Pelvis: No adnexal lesions are observed. Musculoskeletal: A possible nondisplaced left lateral eighth rib fracture is incompletely visualized (series 1, image 611). A nondisplaced fracture of the left ilium near the left sacroiliac joint is slightly comminuted (series 612, image 46). Minimally displaced comminuted fractures of the left superior and inferior pubic ramus are observed (series 611, images 68 through 72). Intramuscular edema is noted in the left proximal adductor compartment musculature.     1. Comminuted mildly displaced fractures of the left superior and inferior pubic rami are noted. A nondisplaced fracture of the left iliac bone near the left sacroiliac joint is also observed. Intramuscular edema is noted in the left proximal adductor muscle compartment. No discrete fluid collection is identified. Intramuscular edema is also noted along the left pelvic sidewall musculature. No free fluid or fluid collections are noted within the pelvis. A possible nondisplaced left lateral sixth rib fracture is incompletely visualized on the limited images through the lung bases. Please see the CT chest and the additional CT examinations performed the same day and dictated separately for additional assessment. 2. No solid organ injury is identified. The kidneys appear heterogeneous in attenuation, similar to the prior examination. The urinary bladder is mildly distended which may indicate urinary retention. No obstructive uropathy is visualized. 3. Moderate retained fecal material is seen within the rectosigmoid colon suggesting constipation. Correlate clinically for fecal impaction. No bowel obstruction is identified. Chronic findings are discussed. **This report has been created using voice recognition software.  It may contain minor errors which are inherent in voice recognition technology.**    XR CHEST PORTABLE    Result Date: 5/21/2024  PROCEDURE: XR CHEST PORTABLE CLINICAL INFORMATION: Trauma TECHNIQUE: Mobile

## 2024-05-22 NOTE — PROGRESS NOTES
Patient transport by cart to 5 from the ER. Patient presented to ER due to fall at home. Xrays reveals FX of left pelvic. History of chronic back pain.Tele was placed. She has a I.V. left antecubital 0.9 running @75mls. She's on room air and her daughters are present throughout her intake. She has scattered bruise and abrasions throughout.

## 2024-05-22 NOTE — PLAN OF CARE
Problem: Discharge Planning  Goal: Discharge to home or other facility with appropriate resources  Outcome: Progressing   SW consult received. See SW note 5/22/24.

## 2024-05-22 NOTE — PROGRESS NOTES
Pt was in bed as her son was with her. She was dealing with multiple fractures of pelvis without disruption of pelvic ring. She was encouraged and anointed. It was appreciated.    05/22/24 1644   Encounter Summary   Encounter Overview/Reason Initial Encounter   Service Provided For Patient and family together   Referral/Consult From [facility]   Support System Children   Last Encounter  05/22/24  (Anointed.)   Complexity of Encounter Low   Begin Time 1300   End Time  1307   Total Time Calculated 7 min   Spiritual/Emotional needs   Type Spiritual Support   Rituals, Rites and Sacraments   Type Anointing   Assessment/Intervention/Outcome   Assessment Calm   Intervention Empowerment   Outcome Acceptance;Encouraged;Engaged in conversation

## 2024-05-22 NOTE — CARE COORDINATION
DISCHARGE PLANNING EVALUATION  5/22/24, 2:58 PM EDT    Reason for Referral: potential SNF, pending therapy evaluations  Decision Maker: self  Current Services: home delivered meals  New Services Requested: SNF  Family/ Social/ Home environment: Leticia lives alone in an independent living apartment. She has 5 children, 3 live locally. Pt's son at bedside during SW visit.   Payment Source:Medicare and [employer]  Transportation at Discharge: ambulance vs ambulette  Post-acute (PAC) provider list was provided to patient. Patient was informed of their freedom to choose PAC provider. Discussed and offered to show the patient the relevant PAC Providers quality and resource use measures on Medicare Compare web site via computer based on patient's goals of care and treatment preferences. Questions regarding selection process were answered.      Teach Back Method used with Leticia regarding care plan and needs  Patient and son verbalized understanding of the plan of care and contribute to goal setting.       Patient preferences and discharge plan: Patient open to SNF, she reports prior to her hospitalization she states she had been talking to her family about considering going to assisted living, reports she did not have a place in mind yet. SW did discussed Medicare SNF benefit, pt reports she has not been to a SNF before. Pt and family will review list for preferences. Pt reports maybe going to AL after SNF. SW did provide AL list as well.     Electronically signed by LASHONDA Rodríguez on 5/22/2024 at 2:58 PM

## 2024-05-22 NOTE — PROGRESS NOTES
Respiratory Care is following the rib fracture order set. Patient's position when testing was done was fowlers.  A Negative Inspiratory Force (NIF) was performed with patient achieving a NIF of -15 cm H2O with several attempts and coaching. The NIF was less than 25 cm H2O. A Forced Vital Capacity (FVC) was obtained with patient achieving an FVC of 1.4 liters. The patient's calculated ideal body weight, (IBW) is  54.6 kg. 0.020 liters/kg of the patient's IBW is 1.09 liters. The patient's FVC was greater than 0.020 liters/kg of IBW. Based on the spirometry measurement alone, patient does not meet ICU admission criteria. Previous FVC was 1.11 liters and previous NIF was -15 cm H2O.  Patient's NIF and FVC are improving from previous screening(s). Last pain medication was given on  05/22/2024 @ 1012. Physician was not called regarding spirometry measurement.

## 2024-05-22 NOTE — CONSULTS
Department of Orthopedic Surgery  Physician Assistant Consult Note        Reason for Consult:  pelvis fracture  Requesting Physician:  Laurie Pierson, APRN - CNP     CHIEF COMPLAINT:  groin pain    History Obtained From:  patient, family member - daughters    HISTORY OF PRESENT ILLNESS:                The patient is a 89 y.o. female who presents with pelvis fracture after a fall at home. Fall was unwitnessed. Patient may have been on floor for 14 hours. She was transported to [facility] ED and admitted to the trauma service. We were consulted for management of pelvis fracture. Patient denies numbness or tingling bilateral lower extremities. She has not tried to weightbear since the fall. Normally ambulates unassisted at home, and with a walker out of the house. Also found to have multiple thoracic and lumbar compression fractures on CT.     Past Medical History:        Diagnosis Date    Acute blood loss as cause of postoperative anemia 12/2/2017    Alcohol abuse sober since 1990    Anxiety     Colon polyp     Dr. Beck Figueroa    Depression     Gastric ulcer 11/2017     nathanael     GERD (gastroesophageal reflux disease)     PUD 1998 Dr. Beck Almanza    Hyperlipidemia     Hypertension 12/3/2017    Hypertension 12/3/2017    Hypothyroid     Incisional hernia of anterior abdominal wall without obstruction or gangrene     Neuropathy, peripheral     Osteoarthritis     Osteoporosis     Pneumoperitoneum 11/29/2017    Thyroid disease     hypothyroid    Vitamin D deficiency      Past Surgical History:        Procedure Laterality Date    CHOLECYSTECTOMY      COLONOSCOPY      [address]11  [facility]    ENDOSCOPY, COLON, DIAGNOSTIC  8-2014    [facility]    EYE SURGERY      HEMORRHOID SURGERY      HERNIA REPAIR      left    JOINT REPLACEMENT      bilat knees. Dr. Dougherty    LAPAROTOMY EXPLORATORY N/A 11/29/2017    EXPLORATORY LAPAROTOMY, PROBABLE ULCER REPAIR performed by Reinier Hernandez DO at [facility] OR    STOMACH SURGERY  11/2017     05/29/2024 at 02:30 PM in IP Unit (STR EXAM ROOM 10)    0 Result Notes  Details    Reading Physician Reading Date Result Priority   Sonu Nicole MD  [phone] 5/21/2024      Narrative & Impression  PROCEDURE: CT LUMBAR RECONSTRUCTION WO POST PROCESS     CLINICAL INFORMATION: trauma.     COMPARISON: MRI scan of the lumbar spine dated 12/24/2013..     TECHNIQUE: 3 mm axial CT images were reconstructed through the lumbar spine.  These are reconstructed from the patient's abdomen and pelvis CT. IV contrast is  present.  Sagittal and coronal reconstructions were obtained.      All CT scans at this facility use dose modulation, iterative reconstruction,  and/or weight-based dosing when appropriate to reduce radiation dose to as low  as reasonably achievable.     FINDINGS:        The lumbar vertebral bodies are normally aligned.  There are remote compression  fractures involving the L1 and L3 vertebral bodies. There is a compression  fracture involving the T12 vertebral body with 80% compression new since  previous MRI scan dated 24 December 2013..  No pars defects are noted. There is  diffuse osteopenia. The posterior elements are within appropriate limits.       On the axial images, at T10-11 and T11-12, there is no disc herniation, canal or  foraminal stenosis.     At T12-L1, there is mild canal and mild to moderate bilateral foraminal  stenosis.     At L1-2, there is mild canal and mild to moderate bilateral foraminal stenosis.     At L2-3, there is mild to moderate canal and moderate bilateral foraminal  stenosis.     At L3-4, there is mild canal and mild to moderate bilateral foraminal stenosis.     At L4-5, there is mild to moderate canal and moderate bilateral foraminal  stenosis.     At L5-S1, there is mild to moderate bilateral foraminal stenosis but no canal  stenosis.        There is degenerative change involving the sacroiliac joints bilaterally.        IMPRESSION:  1. Remote compression fractures

## 2024-05-22 NOTE — PROGRESS NOTES
Comprehensive Nutrition Assessment    Type and Reason for Visit:  Initial, Positive Nutrition Screen, Wound    Nutrition Recommendations/Plan:   Recommend diet vs. NPO per SLP.  Will send Ensure Plus TID once/if diet allows.  Consider MVI.  If requiring NPO status and desires feeding tube- recommend Jevity 1.5 at 10 ml/hr; increase by 10 ml every 6 hours, as tolerated, to goal of 40 ml/hr.    Will monitor need for additional nutrition interventions vs. Goals of care, etc.     Malnutrition Assessment:  Malnutrition Status:  Severe malnutrition (05/22/24 8651)    Context:  Chronic Illness     Findings of the 6 clinical characteristics of malnutrition:  Energy Intake:  75% or less estimated energy requirements for 1 month or longer  Weight Loss:   (-5.5% in 8 months)     Body Fat Loss:  Severe body fat loss Orbital, Triceps, Buccal region   Muscle Mass Loss:  Severe muscle mass loss Temples (temporalis), Clavicles (pectoralis & deltoids), Thigh (quadriceps)  Fluid Accumulation:  Unable to assess     Strength:  Not Performed    Nutrition Assessment:     Pt. severely malnourished AEB criteria listed above.  At risk for further nutritional compromise r/t increase nutrient needs for wound healing, multiple fractures, dysphagia, advanced age and underlying medical condition (hx etoh abuse, depression, GERD, HLD, HTN, Vitamin D deficiency).       Nutrition Related Findings:      Wound Type:  (skin tear, traumatic wound-shoulder, unstagable - sacrum; Pressure injury-back)     Pt. Report/Treatments/Miscellaneous: pt. Seen - reports receiving home delivered meals - consumes ~ 1/day; states will make a scrambled egg and has started drinking 1 Boost/day in the past few months (once she got down to 103#); agrees to receive Ensure once diet allows; SLP following - recommending NPO [place] [facility] [place]; attempted to discuss potential feeding tube wishes if fails MBS - pt. Just states \"I eat fine\"; possible ECF at discharge; currently

## 2024-05-22 NOTE — PROCEDURES
PROCEDURE NOTE  Date: 5/22/2024   Name: Leticia Eason  YOB: 1931    Procedures      12 lead EKG completed. Results handed to DORI Jones

## 2024-05-22 NOTE — PROGRESS NOTES
Respiratory Care is following the rib fracture order set. Patient's position when testing was done was High-Fowlers.  A Negative Inspiratory Force (NIF) was performed with patient achieving a NIF of -15 cm H2O. The NIF was less than 25 cm H2O. A Slow Vital Capacity (SVC) was obtained with patient achieving an SVC of 1.11 liters. The patient's calculated ideal body weight, (IBW) is  54.6 kg. 0.020 liters/kg of the patient's IBW is 1.09 liters. The patient's SVC was greater than 0.020 liters/kg of IBW. Based on the spirometry measurement alone, patient does not meet ICU admission criteria. Last pain medication was given on  5/21 @ 1710. Physician was not called regarding spirometry measurement.

## 2024-05-22 NOTE — PROGRESS NOTES
Pharmacist Review and Automatic Dose Adjustment of Prophylactic Enoxaparin or Heparin    Reviewed reason for admission/hospital problem list    The reviewing pharmacist has made an adjustment to the ordered enoxaparin or heparin dose or converted to heparin per the approved [facility] protocol and table as identified below.      Recent Labs     05/21/24  1359   CREATININE 1.2     Estimated Creatinine Clearance: 23 mL/min (based on SCr of 1.2 mg/dL).    Recent Labs     05/21/24  1359   HGB 9.6*   HCT 28.9*        Recent Labs     05/21/24  1359   INR 0.98       Height:   Ht Readings from Last 1 Encounters:   05/21/24 1.575 m (5' 2\")     Weight:  Wt Readings from Last 1 Encounters:   05/21/24 49 kg (108 lb)         Plan: Based upon the patient's weight and renal function, enoxaparin 40 mg subq daily will be changed to heparin 5,000 units subq BID for CrCl below 30 and weight less than 51kg    Thank you,  Corrie Jacinto, PharmD, BCPS 5/21/2024 9:06 PM

## 2024-05-22 NOTE — CARE COORDINATION
05/22/24 1008   Service Assessment   Patient Orientation Alert and Oriented;Person;Place;Situation;Self   Cognition Alert   History Provided By Patient;Child/Family;Medical Record   Primary Caregiver Self   Accompanied By/Relationship DaughterElisha   Support Systems Children;Family Members   Patient's Healthcare Decision Maker is: Named in Scanned ACP Document   PCP Verified by CM Yes   Last Visit to PCP Within last 3 months   Prior Functional Level Assistance with the following:;Cooking;Housework;Shopping   Current Functional Level Assistance with the following:;Cooking;Housework;Shopping;Bathing;Dressing;Toileting;Mobility   Can patient return to prior living arrangement Unknown at present   Ability to make needs known: Good   Family able to assist with home care needs: Yes   Would you like for me to discuss the discharge plan with any other family members/significant others, and if so, who? No   Financial Resources Medicare;Other (Comment)  ([facility] as secondary and )   Community Resources Other (Comment)  (Meals delivered)   CM/SW Referral ADLs/IADLs   Social/Functional History   Active  No   Patient's  Info Patient's children.   Occupation Retired   Discharge Planning   Type of Residence House   Living Arrangements Spouse/Significant Other   Current Services Prior To Admission Meals On Wheels;Durable Medical Equipment   Current DME Prior to Arrival Walker   Potential Assistance Needed Skilled Nursing Facility;Home Care   DME Ordered? No   Potential Assistance Purchasing Medications No   Type of Home Care Services None   Patient expects to be discharged to: Skilled nursing facility   History of falls? 1   Services At/After Discharge   Transition of Care Consult (CM Consult) SNF   Services At/After Discharge Skilled Nursing Facility (SNF)   Confirm Follow Up Transport Family   Condition of Participation: Discharge Planning   The Plan for Transition of Care is related to the  discussed.     Patient Goals/Plan/Treatment Preferences: Leticia resides at home alone. She is able to afford her medications and has the DME needed. Leticia and her daughter, Elisha, are aware she will need rehab prior to returning home. Following for needs. Ortho spine to see patient and therapies to evaluate.

## 2024-05-22 NOTE — PROGRESS NOTES
Trauma notified of elevated troponin. Ortho consulted and noted surgery needed at this time. Patient not oriented to place or time.Has to be cued frequently to keep her arm straight due to IV. Was unable to use a straw to drink her water didn't seem to understand concept.

## 2024-05-22 NOTE — CONSULTS
Physician Consult Note        Patient:   Leticia Eason  YOB: 1931  Age:  89 y.o.  Room:  [facility]-  MRN:  [id number]   Acct: [id number]  PCP: Jose Hunt MD    Date of Admission:  5/21/2024  1:23 PM  Date of Service:  5/22/2024    Reason for Consult: Goals of Care and Geriatric Trauma Protocol             Subjective   Chief Complaint:-    Chief Complaint   Patient presents with    Fall        History Obtained From:-  Patient, Electronic Medical Record    History of Present Illness:-            Leticia Eason is a 89 y.o. female who  has a past medical history of Acute blood loss as cause of postoperative anemia, Alcohol abuse, Anxiety, Colon polyp, Depression, Gastric ulcer, GERD (gastroesophageal reflux disease), Hyperlipidemia, Hypertension, Hypertension, Hypothyroid, Incisional hernia of anterior abdominal wall without obstruction or gangrene, Neuropathy, peripheral, Osteoarthritis, Osteoporosis, Pneumoperitoneum, Thyroid disease, and Vitamin D deficiency. They present to the hospital with Fall and are admitted for Multiple fractures of pelvis without disruption of pelvic ring, initial encounter for closed fracture (HCC). Patient initially presented to [facility] on May 21, 2024 following a fall at home.  Her daughter found her on the floor bathroom.  It was not clear what time she fell or how long she had been down for.  She was pleasantly confused on arrival to the emergency department.  This is not her baseline is normally she is alert and oriented x 4.  Workup in the emergency department was significant for CT  thoracic spine and lumbar spine demonstrating acute compression fractures of the L1, L3, T8, T9, T10 and T12 compression fractures, CT abdomen pelvis with a mildly displaced fracture of the left superior and inferior pubic rami, nondisplaced fracture of the left iliac bone near the sacroiliac joint and a possible nondisplaced  technology.**            XR KNEE LEFT (MIN 4 VIEWS)   Final Result      No fracture or dislocation.            XR RADIUS ULNA LEFT (2 VIEWS)   Final Result      No fracture or dislocation.            XR SHOULDER LEFT (MIN 2 VIEWS)   Final Result      No acute fracture or dislocation.            XR CHEST PORTABLE   Final Result      No acute intrathoracic process.               **This report has been created using voice recognition software. It may contain   minor errors which are inherent in voice recognition technology.**            XR CHEST PORTABLE    (Results Pending)   FL MODIFIED BARIUM SWALLOW W VIDEO    (Results Pending)        Palliative Performance Scale   90%   Full ambulation; normal activity and work; some evidence of disease; able to do own self care; normal intake; fully conscious    Assessment and Plan   Leticia Eason is a 89 y.o. who is admitted to [facility] for Multiple fractures of pelvis without disruption of pelvic ring, initial encounter for closed fracture (HCC). Palliative care is consulted for Goals of Care.  Patient's primary complaint is her pain.  I will defer pain management to her primary team.  I had a goals of care discussion with the patient and her family in her room this afternoon. They would not want cardiopulmomary resuscitation in the event of cardiac arrest. They would want to attempt to sustain life by all other medically effective means. This includes providing appropriate medical and surgical treatments as indicated to attempt to prolong life, including intensive care, and mechanical ventilation. The patient would be will to be intubated and ventilated for a surgery if necessary. They desire a short term trial of mechanical ventilation. They would not want a tracheostomy. They would not want to be dependent on long term mechanical ventilation. They desire providing feeding through new or existing surgically-placed tubes.  This is consistent with a CODE

## 2024-05-22 NOTE — PLAN OF CARE
Problem: Safety - Adult  Goal: Free from fall injury  Outcome: Progressing     Problem: ABCDS Injury Assessment  Goal: Absence of physical injury  Outcome: Progressing     Problem: Skin/Tissue Integrity  Goal: Absence of new skin breakdown  Description: 1.  Monitor for areas of redness and/or skin breakdown  2.  Assess vascular access sites hourly  3.  Every 4-6 hours minimum:  Change oxygen saturation probe site  4.  Every 4-6 hours:  If on nasal continuous positive airway pressure, respiratory therapy assess nares and determine need for appliance change or resting period.  Outcome: Progressing  Care plan reviewed with patient.  Patient verbalizes understanding of the plan of care and contribute to goal setting.

## 2024-05-22 NOTE — PROGRESS NOTES
completion of standardized testing, formal and informal observation of tasks, assessment of data and development of plan of care and goals.  Treatment time included skilled education and facilitation of tasks to increase safety and independence with functional mobility for improved independence and quality of life.    Assessment:  Body Structures, Functions, Activity Limitations Requiring Skilled Therapeutic Intervention: Decreased functional mobility , Decreased ROM, Decreased strength, Decreased endurance, Decreased balance, Increased pain, Decreased posture  Assessment: Leticia Eason is a 89 y.o. female that presents with multiple fractures of pelvis without disruption of pelvic ring. Pt demonstrates a decrease in baseline by way of bed mobility, transfers and ambulation secondary to decreased activity tolerance, strength, fatigue, and balance deficits. Pt will benefit from skilled PT services throughout admission and beyond hospital discharge for improvements in functional mobility and in order to decrease fall risk and return pt to PLOF.     Therapy Prognosis: Fair    Requires PT Follow-Up: Yes    Discharge Recommendations:  Discharge Recommendations: Subacute/Skilled Nursing Facility, Therapy recommended at discharge    Patient Education:      .    Patient Education  Education Given To: Patient, Family  Education Provided: Role of Therapy, Plan of Care, Transfer Training, Precautions  Education Method: Demonstration, Verbal  Barriers to Learning: None  Education Outcome: Verbalized understanding       Equipment Recommendations:  Equipment Needed: No    Plan:  Current Treatment Recommendations: Strengthening, Balance training, Functional mobility training, Transfer training, Endurance training, Pain management, Stair training, Gait training, Home exercise program, Patient/Caregiver education & training, Safety education & training, Therapeutic activities  General Plan:  (4-5x/wk; O)    Goals:     Short Term  Goals  Time Frame for Short Term Goals: by discharge  Short Term Goal 1: Pt will perform sit<>supine with SBA to get in/out of bed.  Short Term Goal 2: Pt will perform sit<>stand with RW and SBA to progress mobility.  Short Term Goal 3: PT to assess gait once appropriate  Long Term Goals  Time Frame for Long Term Goals : NA d/t short ELOS    Following session, patient left in safe position with all fall risk precautions in place.

## 2024-05-22 NOTE — CONSULTS
Inpatient Consultation    Leticia Eason (11/12/1931)  5/22/2024    Reason for Consult:  T/L VCF  Requesting Physician: Laurie Pierson, KARTHIKEYAN - CNP     CHIEF COMPLAINT:  Confusion, fall    History Obtained From:  patient, family member - daughter, electronic medical record    HISTORY OF PRESENT ILLNESS:                The patient is a 89 y.o. female who presents with above chief complaint.  Patient presented to the ED yesterday after her daughter found her on the floor of her bathroom, unclear of what time she fell. Patient's daughter reports patient has been confusion since the fall. CT cervical spine showed no acute fractures. CT T/L spine showed L1 and L3 remote VCF (present on MRI from 2013), T12, T9, T8, T10 VCF. Patient also diagnosed with pelvic fracture & ortho has been consulted. Patient typically ambulate unassisted at home.     Past Medical History:        Diagnosis Date    Acute blood loss as cause of postoperative anemia 12/2/2017    Alcohol abuse sober since 1990    Anxiety     Colon polyp     Dr. Beck Figueroa    Depression     Gastric ulcer 11/2017     nathanael     GERD (gastroesophageal reflux disease)     PUD 1998 Dr. Beck Almanza    Hyperlipidemia     Hypertension 12/3/2017    Hypertension 12/3/2017    Hypothyroid     Incisional hernia of anterior abdominal wall without obstruction or gangrene     Neuropathy, peripheral     Osteoarthritis     Osteoporosis     Pneumoperitoneum 11/29/2017    Thyroid disease     hypothyroid    Vitamin D deficiency      Past Surgical History:        Procedure Laterality Date    CHOLECYSTECTOMY      COLONOSCOPY      [address]11  [facility]    ENDOSCOPY, COLON, DIAGNOSTIC  8-2014    [facility]    EYE SURGERY      HEMORRHOID SURGERY      HERNIA REPAIR      left    JOINT REPLACEMENT      bilat knees. Dr. Dougherty    LAPAROTOMY EXPLORATORY N/A 11/29/2017    EXPLORATORY LAPAROTOMY, PROBABLE ULCER REPAIR performed by Reinier Hernandez DO at [facility] OR    STOMACH SURGERY  11/2017

## 2024-05-22 NOTE — PALLIATIVE CARE
Initial Evaluation        Patient:   Leticia Eason  YOB: 1931  Age:  89 y.o.  Room:  [facility]-  MRN:  [id number]   Acct: [id number]    Date of Admission:  5/21/2024  1:23 PM  Date of Service:  5/22/2024  Completed By:  Ailyn Orellana RN                 Reason for Palliative Care Evaluation:-               [] Code Status Discussion              [x] Goals of Care              [] Pain/Symptom Management               [] Emotional Support              [] Other:                    Current Issues:-   [x]  Pain  []  Fatigue  []  Nausea  []  Anxiety  []  Depression  []  Shortness of Breath  []  Constipation  []  Appetite  []  Other:              Advance Directives:-  family states they have paperwork at home. Requested them to bring in paperwork.  [] [place] DNR Form  [] Living Will  [] Medical POA              Current Code Status:-     [x] Full Resuscitation  [] DNR-Comfort Care-Arrest  [] DNR-Comfort Care       [] Limited Resuscitation             [] No CPR            [] No shock            [] No ET intubation/reintubation            [] No resuscitative medications            [] Other limitation:               Palliative Performance Status:-      [] 100%  Full ambulation; normal activity and work; no evidence of disease; able to do own self care; normal intake; fully conscious     [x] 90%   Full ambulation; normal activity and work; some evidence of disease; able to do own self care; normal intake; fully conscious    [] 80%   Full ambulation; normal activity with effort; some evidence of disease; able to do own self care; normal or reduced intake; fully conscious    [] 70%  Ambulation reduced; unable to perform normal job/work; significant  disease; able to do own self care; normal or reduced intake; fully conscious    [] 60%  Ambulation reduced; Significant disease;Can't do hobbies/housework; intake normal or reduced; occasional assist; LOC full/confusion    [] 50%  Mainly sit/lie; Extensive

## 2024-05-22 NOTE — PROGRESS NOTES
[facility]  OCCUPATIONAL THERAPY MISSED TREATMENT NOTE  STRZ ONC MED 5K  5K-07/007-A      Date: 2024  Patient Name: Leticia Eason        CSN: [id number]   : 1931  (89 y.o.)  Gender: female                REASON FOR MISSED TREATMENT:  first attempt to eval patient at 1115. with patient off floor. Second attempt to eval patient at 1340 with patient working with ST, third attempt to eval patient at 1412 with PT working with patient. OT to attempt back another date.

## 2024-05-23 ENCOUNTER — APPOINTMENT (OUTPATIENT)
Dept: GENERAL RADIOLOGY | Age: 89
End: 2024-05-23
Payer: MEDICARE

## 2024-05-23 LAB
ANION GAP SERPL CALC-SCNC: 8 MEQ/L (ref 8–16)
BACTERIA UR CULT: ABNORMAL
BASOPHILS ABSOLUTE: 0 THOU/MM3 (ref 0–0.1)
BASOPHILS NFR BLD AUTO: 0.3 %
BUN SERPL-MCNC: 27 MG/DL (ref 7–22)
CALCIUM SERPL-MCNC: 8.2 MG/DL (ref 8.5–10.5)
CHLORIDE SERPL-SCNC: 110 MEQ/L (ref 98–111)
CO2 SERPL-SCNC: 21 MEQ/L (ref 23–33)
CREAT SERPL-MCNC: 0.7 MG/DL (ref 0.4–1.2)
DEPRECATED RDW RBC AUTO: 51.7 FL (ref 35–45)
EOSINOPHIL NFR BLD AUTO: 0.3 %
EOSINOPHILS ABSOLUTE: 0 THOU/MM3 (ref 0–0.4)
ERYTHROCYTE [DISTWIDTH] IN BLOOD BY AUTOMATED COUNT: 14.7 % (ref 11.5–14.5)
GFR SERPL CREATININE-BSD FRML MDRD: 81 ML/MIN/1.73M2
GLUCOSE SERPL-MCNC: 168 MG/DL (ref 70–108)
HCT VFR BLD AUTO: 25.2 % (ref 37–47)
HGB BLD-MCNC: 8 GM/DL (ref 12–16)
IMM GRANULOCYTES # BLD AUTO: 0.02 THOU/MM3 (ref 0–0.07)
IMM GRANULOCYTES NFR BLD AUTO: 0.3 %
LYMPHOCYTES ABSOLUTE: 0.8 THOU/MM3 (ref 1–4.8)
LYMPHOCYTES NFR BLD AUTO: 12.9 %
MAGNESIUM SERPL-MCNC: 2 MG/DL (ref 1.6–2.4)
MCH RBC QN AUTO: 30.5 PG (ref 26–33)
MCHC RBC AUTO-ENTMCNC: 31.7 GM/DL (ref 32.2–35.5)
MCV RBC AUTO: 96.2 FL (ref 81–99)
MONOCYTES ABSOLUTE: 0.4 THOU/MM3 (ref 0.4–1.3)
MONOCYTES NFR BLD AUTO: 7 %
MYOGLOBIN SERPL-MCNC: 1350 NG/ML
NEUTROPHILS ABSOLUTE: 4.8 THOU/MM3 (ref 1.8–7.7)
NEUTROPHILS NFR BLD AUTO: 79.2 %
NRBC BLD AUTO-RTO: 0 /100 WBC
ORGANISM: ABNORMAL
PLATELET # BLD AUTO: 106 THOU/MM3 (ref 130–400)
PMV BLD AUTO: 11.3 FL (ref 9.4–12.4)
POTASSIUM SERPL-SCNC: 3.2 MEQ/L (ref 3.5–5.2)
RBC # BLD AUTO: 2.62 MILL/MM3 (ref 4.2–5.4)
SODIUM SERPL-SCNC: 139 MEQ/L (ref 135–145)
WBC # BLD AUTO: 6.1 THOU/MM3 (ref 4.8–10.8)

## 2024-05-23 PROCEDURE — 6370000000 HC RX 637 (ALT 250 FOR IP): Performed by: NURSE PRACTITIONER

## 2024-05-23 PROCEDURE — 6360000002 HC RX W HCPCS: Performed by: NURSE PRACTITIONER

## 2024-05-23 PROCEDURE — 83735 ASSAY OF MAGNESIUM: CPT

## 2024-05-23 PROCEDURE — 1200000003 HC TELEMETRY R&B

## 2024-05-23 PROCEDURE — 92611 MOTION FLUOROSCOPY/SWALLOW: CPT

## 2024-05-23 PROCEDURE — 74230 X-RAY XM SWLNG FUNCJ C+: CPT

## 2024-05-23 PROCEDURE — 71045 X-RAY EXAM CHEST 1 VIEW: CPT

## 2024-05-23 PROCEDURE — 99232 SBSQ HOSP IP/OBS MODERATE 35: CPT | Performed by: SURGERY

## 2024-05-23 PROCEDURE — 94010 BREATHING CAPACITY TEST: CPT

## 2024-05-23 PROCEDURE — 80048 BASIC METABOLIC PNL TOTAL CA: CPT

## 2024-05-23 PROCEDURE — 2580000003 HC RX 258: Performed by: NURSE PRACTITIONER

## 2024-05-23 PROCEDURE — 94799 UNLISTED PULMONARY SVC/PX: CPT

## 2024-05-23 PROCEDURE — 2500000003 HC RX 250 WO HCPCS: Performed by: SURGERY

## 2024-05-23 PROCEDURE — 97166 OT EVAL MOD COMPLEX 45 MIN: CPT

## 2024-05-23 PROCEDURE — 85025 COMPLETE CBC W/AUTO DIFF WBC: CPT

## 2024-05-23 PROCEDURE — 36415 COLL VENOUS BLD VENIPUNCTURE: CPT

## 2024-05-23 PROCEDURE — 97530 THERAPEUTIC ACTIVITIES: CPT

## 2024-05-23 RX ORDER — POTASSIUM CHLORIDE 7.45 MG/ML
10 INJECTION INTRAVENOUS PRN
Status: DISCONTINUED | OUTPATIENT
Start: 2024-05-23 | End: 2024-05-24

## 2024-05-23 RX ORDER — POLYETHYLENE GLYCOL 3350 17 G/17G
17 POWDER, FOR SOLUTION ORAL 2 TIMES DAILY
Status: DISCONTINUED | OUTPATIENT
Start: 2024-05-23 | End: 2024-05-24 | Stop reason: HOSPADM

## 2024-05-23 RX ORDER — POTASSIUM CHLORIDE 20 MEQ/1
40 TABLET, EXTENDED RELEASE ORAL PRN
Status: DISCONTINUED | OUTPATIENT
Start: 2024-05-23 | End: 2024-05-24

## 2024-05-23 RX ADMIN — LACTULOSE 20 G: 20 SOLUTION ORAL at 14:13

## 2024-05-23 RX ADMIN — SODIUM CHLORIDE, PRESERVATIVE FREE 10 ML: 5 INJECTION INTRAVENOUS at 11:06

## 2024-05-23 RX ADMIN — DOCUSATE SODIUM 100 MG: 100 CAPSULE, LIQUID FILLED ORAL at 11:03

## 2024-05-23 RX ADMIN — ACETAMINOPHEN 650 MG: 325 TABLET ORAL at 17:22

## 2024-05-23 RX ADMIN — METHOCARBAMOL 1000 MG: 500 TABLET ORAL at 11:04

## 2024-05-23 RX ADMIN — SODIUM CHLORIDE, PRESERVATIVE FREE 10 ML: 5 INJECTION INTRAVENOUS at 21:14

## 2024-05-23 RX ADMIN — LACTULOSE 20 G: 20 SOLUTION ORAL at 21:12

## 2024-05-23 RX ADMIN — PAROXETINE HYDROCHLORIDE 10 MG: 20 TABLET, FILM COATED ORAL at 11:06

## 2024-05-23 RX ADMIN — HEPARIN SODIUM 5000 UNITS: 5000 INJECTION INTRAVENOUS; SUBCUTANEOUS at 21:12

## 2024-05-23 RX ADMIN — POLYETHYLENE GLYCOL 3350 17 G: 17 POWDER, FOR SOLUTION ORAL at 21:12

## 2024-05-23 RX ADMIN — PANTOPRAZOLE SODIUM 40 MG: 40 TABLET, DELAYED RELEASE ORAL at 17:23

## 2024-05-23 RX ADMIN — CIPROFLOXACIN 400 MG: 400 INJECTION, SOLUTION INTRAVENOUS at 14:13

## 2024-05-23 RX ADMIN — BARIUM SULFATE 20 ML: 0.81 POWDER, FOR SUSPENSION ORAL at 09:55

## 2024-05-23 RX ADMIN — ONDANSETRON 4 MG: 2 INJECTION INTRAMUSCULAR; INTRAVENOUS at 15:37

## 2024-05-23 RX ADMIN — ACETAMINOPHEN 650 MG: 325 TABLET ORAL at 21:11

## 2024-05-23 RX ADMIN — LEVOTHYROXINE SODIUM 50 MCG: 0.05 TABLET ORAL at 05:35

## 2024-05-23 RX ADMIN — SENNOSIDES 17.2 MG: 8.6 TABLET, FILM COATED ORAL at 21:12

## 2024-05-23 RX ADMIN — POTASSIUM CHLORIDE 40 MEQ: 1500 TABLET, EXTENDED RELEASE ORAL at 17:22

## 2024-05-23 RX ADMIN — ONDANSETRON 4 MG: 2 INJECTION INTRAMUSCULAR; INTRAVENOUS at 23:42

## 2024-05-23 RX ADMIN — LORAZEPAM 0.5 MG: 0.5 TABLET ORAL at 21:21

## 2024-05-23 RX ADMIN — GABAPENTIN 300 MG: 300 CAPSULE ORAL at 21:13

## 2024-05-23 RX ADMIN — ACETAMINOPHEN 650 MG: 325 TABLET ORAL at 03:53

## 2024-05-23 RX ADMIN — LACTULOSE 20 G: 20 SOLUTION ORAL at 11:03

## 2024-05-23 RX ADMIN — SODIUM CHLORIDE: 9 INJECTION, SOLUTION INTRAVENOUS at 11:32

## 2024-05-23 RX ADMIN — METHOCARBAMOL 1000 MG: 500 TABLET ORAL at 14:13

## 2024-05-23 RX ADMIN — METHOCARBAMOL 1000 MG: 500 TABLET ORAL at 21:13

## 2024-05-23 RX ADMIN — PANTOPRAZOLE SODIUM 40 MG: 40 TABLET, DELAYED RELEASE ORAL at 05:35

## 2024-05-23 RX ADMIN — CIPROFLOXACIN 400 MG: 400 INJECTION, SOLUTION INTRAVENOUS at 04:12

## 2024-05-23 RX ADMIN — POLYETHYLENE GLYCOL 3350 17 G: 17 POWDER, FOR SOLUTION ORAL at 11:05

## 2024-05-23 RX ADMIN — BARIUM SULFATE 10 ML: 400 PASTE ORAL at 09:55

## 2024-05-23 RX ADMIN — HEPARIN SODIUM 5000 UNITS: 5000 INJECTION INTRAVENOUS; SUBCUTANEOUS at 11:03

## 2024-05-23 RX ADMIN — METHOCARBAMOL 1000 MG: 500 TABLET ORAL at 17:23

## 2024-05-23 RX ADMIN — ACETAMINOPHEN 650 MG: 325 TABLET ORAL at 11:03

## 2024-05-23 RX ADMIN — BARIUM SULFATE 10 ML: 400 SUSPENSION ORAL at 09:55

## 2024-05-23 ASSESSMENT — PAIN SCALES - WONG BAKER: WONGBAKER_NUMERICALRESPONSE: NO HURT

## 2024-05-23 ASSESSMENT — PAIN SCALES - GENERAL
PAINLEVEL_OUTOF10: 9
PAINLEVEL_OUTOF10: 3
PAINLEVEL_OUTOF10: 0
PAINLEVEL_OUTOF10: 3
PAINLEVEL_OUTOF10: 8
PAINLEVEL_OUTOF10: 6
PAINLEVEL_OUTOF10: 0
PAINLEVEL_OUTOF10: 0
PAINLEVEL_OUTOF10: 6

## 2024-05-23 ASSESSMENT — PAIN - FUNCTIONAL ASSESSMENT
PAIN_FUNCTIONAL_ASSESSMENT: PREVENTS OR INTERFERES SOME ACTIVE ACTIVITIES AND ADLS
PAIN_FUNCTIONAL_ASSESSMENT: ACTIVITIES ARE NOT PREVENTED
PAIN_FUNCTIONAL_ASSESSMENT: PREVENTS OR INTERFERES SOME ACTIVE ACTIVITIES AND ADLS
PAIN_FUNCTIONAL_ASSESSMENT: ACTIVITIES ARE NOT PREVENTED
PAIN_FUNCTIONAL_ASSESSMENT: PREVENTS OR INTERFERES SOME ACTIVE ACTIVITIES AND ADLS
PAIN_FUNCTIONAL_ASSESSMENT: ACTIVITIES ARE NOT PREVENTED

## 2024-05-23 ASSESSMENT — PAIN DESCRIPTION - LOCATION
LOCATION: BACK
LOCATION: ABDOMEN
LOCATION: BACK
LOCATION: BACK
LOCATION: ABDOMEN
LOCATION: GENERALIZED

## 2024-05-23 ASSESSMENT — PAIN DESCRIPTION - DESCRIPTORS
DESCRIPTORS: ACHING

## 2024-05-23 ASSESSMENT — PAIN DESCRIPTION - ORIENTATION
ORIENTATION: LOWER

## 2024-05-23 NOTE — PROGRESS NOTES
Physician Progress Note      PATIENT:               REDDY HERNANDEZ  [facility] #:                  [id number]  :                       1931  ADMIT DATE:       2024 1:23 PM  DISCH DATE:  RESPONDING  PROVIDER #:        MANJULA PETTIT APRN - [facility]          QUERY TEXT:    Manjula Salgado,    Pt admitted with fall with several fractures. Per notes: Rhabdomyolysis -   secondary to prolonged immobilization s/p fall.  If possible, please document   in progress notes and discharge summary if you are evaluating and/or treating   any of the following:    The medical record reflects the following:  Risk Factors: elderly  Clinical Indicators: Per notes: Rhabdomyolysis - secondary to prolonged   immobilization s/p fall. Patient may have been on floor for 14 hours.  Treatment: trend CK - gentle hydration    India GALEANA, RN    Per [email or web address]  Traumatic rhabdomyolysis cause examples: crush syndrome, prolonged   immobilization  Nontraumatic rhabdomyolysis cause examples:  marked exertion, hyperthermia,   metabolic myopathy, drugs or toxins, infections, electrolyte disorders.  Options provided:  -- Traumatic rhabdomyolysis  -- Nontraumatic rhabdomyolysis  -- Other - I will add my own diagnosis  -- Disagree - Not applicable / Not valid  -- Disagree - Clinically unable to determine / Unknown  -- Refer to Clinical Documentation Reviewer    PROVIDER RESPONSE TEXT:    This patient has traumatic rhabdomyolysis.    Query created by: Liberty Peguero on 2024 7:50 AM      QUERY TEXT:    Manjula Salgado,    Pt admitted with rhabdomyolysis, acute cystitis, & AMS. Patient's daughter   reports patient has been confusion since the fall. If possible, please   document in the progress notes and discharge summary if you are evaluating and   / or treating any of the following:    The medical record reflects the following:  Risk Factors: elderly, fall  Clinical Indicators:

## 2024-05-23 NOTE — PROGRESS NOTES
[facility]  Trauma Surgery - Dr. Raya Wilkes  Daily Progress Note  Pt Name: Leticia Eason  Medical Record Number: [id number]  Date of Birth 11/12/1931   Today's Date: 5/23/2024    HD: # 2    CC: back pain     ASSESSMENT  1.  Active Hospital Problems    Diagnosis Date Noted    Severe malnutrition (HCC) [E43] 05/22/2024     Class: Chronic    Multiple closed fractures of pelvis without disruption of pelvic ring with routine healing [S32.82XD] 05/21/2024    Compression fracture of body of thoracic vertebra (HCC) [S22.000A] 05/21/2024    Closed fracture of one rib of left side with routine healing [S22.32XD] 05/21/2024    Multiple fractures of pelvis without disruption of pelvic ring, initial encounter for closed fracture (HCC) [S32.82XA] 05/21/2024         PLAN  Admitted to  under Trauma Services    Trauma by fall   - PT, OT eval and treat   - Fall precautions   - accepted at [facility] once cleared      Multiple pelvic fractures without disruption of pelvic ring               - per imaging, comminuted mildly displaced fracture of the left superior and inferior pubic rami  - non-displaced fracture of left iliac bone near the left sacroiliac joint              - consult ortho               - fractures likely non-op              - will need PT/OT once cleared by ortho               - external urinary catheter until cleared for activity by ortho    - 05/22: No surgical intervention warranted.  WBAT to BLE with assistance and walker.  Follow up as outpatient in 2 weeks at [facility] pending discharge plans     Thoracic compression fractures and Lumbar compression fractures               - CT lumbar recons note remote compression fractures involving the L1 and L3 vertebral bodies.    - CT thoracic recons note compression fractures involving the T12 and T9 vertebral body with 80% compression and T8 and T10 with 30% compression  - Ortho spine consulted  - 05/22: Bracing vs kyphoplasty if pt is symptomatic  changes in the ethmoid air cells and mastoid air cells bilaterally. **This report has been created using voice recognition software. It may contain minor errors which are inherent in voice recognition technology.**     XR HAND LEFT (MIN 3 VIEWS)    Result Date: 5/22/2024  Left hand three views Comparison: None Findings: No acute fracture or dislocation identified. Degenerative change noted multiple joints. Findings are most prominent 1st CMC joint. Diffuse loss of bone density observed. No radiopaque foreign body noted.     Impression: No acute bony abnormality This document has been electronically signed by: John Gongora MD on 05/22/2024 03:02 AM    XR CHEST PORTABLE    Result Date: 5/22/2024  1 view chest x-ray Comparison: 05/21/2024 Findings: Lungs are clear without acute infiltrates. No pneumothorax. Heart size normal. No acute bony abnormalities. Lower lateral left rib fractures noted. These are not well assessed on this portable chest x-ray.     Impression: No acute processes This document has been electronically signed by: John Gongora MD on 05/22/2024 02:58 AM    CT THORACIC RECONSTRUCTION WO POST PROCESS    Result Date: 5/21/2024  PROCEDURE: CT THORACIC RECONSTRUCTION WO POST PROCESS CLINICAL INFORMATION: trauma. COMPARISON: MRI scan of the thoracic spine dated 24 December 2013.. TECHNIQUE: 3 mm axial noncontrast CT images were reconstructed through the thoracic spine. These are reconstructed from the patient's chest CT. IV contrast is present. Sagittal and coronal reconstructions were obtained. All CT scans at this facility use dose modulation, iterative reconstruction, and/or weight-based dosing when appropriate to reduce radiation dose to as low as reasonably achievable. FINDINGS: The patient is kyphotic..  There is diffuse osteopenia.. There is a compression fracture involving the T9 vertebral body with 80% compression, new since remote MRI scan dated 24 December 2013. There is no retropulsion

## 2024-05-23 NOTE — DISCHARGE INSTR - COC
Continuity of Care Form    Patient Name: Leticia Eason   :  1931  MRN:  [id number]    Admit date:  2024  Discharge date:  ***    Code Status Order: DNR-CCA   Advance Directives:     Admitting Physician:  Raya Wilkes MD  PCP: Jose Hunt MD    Discharging Nurse: ***  Discharging Hospital Unit/Room#: 5K-07/007-A  Discharging Unit Phone Number: ***    Emergency Contact:   Extended Emergency Contact Information  Primary Emergency Contact: Radha Mena   [facility]  Home Phone: [phone]  Relation: Child  Secondary Emergency Contact: Elisha Martinez  Home Phone: [phone]  Mobile Phone: [phone]  Relation: Child    Past Surgical History:  Past Surgical History:   Procedure Laterality Date    CHOLECYSTECTOMY      COLONOSCOPY        [facility]    ENDOSCOPY, COLON, DIAGNOSTIC      [facility]    EYE SURGERY      HEMORRHOID SURGERY      HERNIA REPAIR      left    JOINT REPLACEMENT      bilat knees. Dr. Dougherty    LAPAROTOMY EXPLORATORY N/A 2017    EXPLORATORY LAPAROTOMY, PROBABLE ULCER REPAIR performed by Reinier Hernandez DO at [facility] OR    STOMACH SURGERY  2017    ulcer  repair   nathanael     TONSILLECTOMY      UPPER GASTROINTESTINAL ENDOSCOPY        [facility]    UPPER GASTROINTESTINAL ENDOSCOPY Left 2022    EGD DILATATION performed by Hernandez Figueroa MD at [facility]       Immunization History:   Immunization History   Administered Date(s) Administered    COVID-19, MODERNA BLUE border, Primary or Immunocompromised, (age 12y+), IM, 100 mcg/0.5mL 2021, 2021, 2021    COVID-19, MODERNA, ( formula), (age 12y+), IM, 50mcg/0.5mL 2023    COVID-19, PFIZER Bivalent, DO NOT Dilute, (age 12y+), IM, 30 mcg/0.3 mL 10/31/2022    Influenza 10/16/2012, 10/22/2013    Influenza Virus Vaccine 10/09/2014, 2015    Influenza, AFLURIA (age 3 yrs+), FLUZONE, (age 6 mo+), MDV, 0.5mL 10/11/2019, 2020, 10/19/2022, 2023

## 2024-05-23 NOTE — PROGRESS NOTES
Comprehensive Nutrition Assessment    Type and Reason for Visit:  Reassess    Nutrition Recommendations/Plan:   Recommend diet as per SLP.  Trial Magic Cups TID (u/a to send Ensure d/t order for thickened liquids).  Recommend MVI.  Encouraged po, good nutrition at best efforts.  Recommend continue ONS at discharge.     Malnutrition Assessment:  Malnutrition Status:  Severe malnutrition (05/22/24 9116)    Context:  Chronic Illness     Findings of the 6 clinical characteristics of malnutrition:  Energy Intake:  75% or less estimated energy requirements for 1 month or longer  Weight Loss:   (-5.5% in 8 months)     Body Fat Loss:  Severe body fat loss Orbital, Triceps, Buccal region   Muscle Mass Loss:  Severe muscle mass loss Temples (temporalis), Clavicles (pectoralis & deltoids), Thigh (quadriceps)  Fluid Accumulation:  Unable to assess     Strength:  Not Performed    Nutrition Assessment:     Pt. severely malnourished AEB criteria listed above.  At risk for further nutritional compromise r/t increase nutrient needs for wound healing, multiple fractures, dysphagia, advanced age and underlying medical condition (hx etoh abuse, depression, GERD, HLD, HTN, Vitamin D deficiency).      Nutrition Related Findings:      Wound Type:  (skin tear, traumatic wound-shoulder, unstagable - sacrum; Pressure injury-back)     Pt. Report/Treatments/Miscellaneous: pt. Seen w/ family; s/p SLP eval-order for minced and moist diet w/ nectar thickened liquids; pt. Agrees to trial Magic Cup ONS TID; plan for ECF at discharge; Palliative care on case-code status changed to DNRCCA  GI Status: no BM  Pertinent Labs: 5/23: Glucose 168, BUN 27, Cr 0.7, Potassium 3.2  Pertinent Meds: Colace, Glycolax, Senokot, Cipro, Lactulose, Zofran       Current Nutrition Intake & Therapies:    Average Meal Intake:  (diet started)  Average Supplements Intake:  (initiated)  ADULT DIET; Dysphagia - Minced and Moist; Mildly Thick (Nectar)  ADULT ORAL

## 2024-05-23 NOTE — CARE COORDINATION
5/23/24, 1:55 PM EDT    DISCHARGE PLANNING EVALUATION       Spoke with Janine at [facility] and they have accepted patient.  Updated patient's family.

## 2024-05-23 NOTE — CARE COORDINATION
Patient educated on how to use incentive spirometer. Patient verbalized understanding and demonstrated proper use. Emphasized importance and usage of device, with coughing and deep breathing every 1 hours while awake.

## 2024-05-23 NOTE — PALLIATIVE CARE
Follow Up / Progress Note        Patient:   Leticia Eason  YOB: 1931  Age:  89 y.o.  Room:  [facility]07/[facility]  MRN:  [id number]         Family/Patient Discussion:  Received a phone call from patient's daughter, Elisha (medical POA) and she has brought in the patient's advance directives. Arrived to the room and Elisha and 2 other family members were at the bedside.  This palliative care RN introduced herself.   Medical POA and living will documents were copied at this time with original copies returned to Elisha.  Elisha did ask this RN what palliative care does.  This RN did inform Elisha that goals of care/patient's wishes are discussed and did let her know that the patient's code status was changed to DNR CCA.  Elisha and other family members indicate that the patient is a full code.  This RN went on to discuss code status levels and complications associated with resuscitative measures.  Patient recalls the conversation and confirms that she does not want resuscitated if her heart/breathing should stop.  Discussed [place] DNR form and its use.  Family is supportive of the patient's wishes.      Plan/Follow-Up:  Advance directives copy faxed to medical records and copy placed in the paper chart.  [place] DNR form completed (Dr. Shady Pérez-resident working with Dr. Tran-signed the DNR) with copy placed in paper chart, original placed in chart cubby for transport to [facility], copy faxed to medical records and 2 copies provided to Elisha (patient's medical POA).  Palliative care will remain available if further needs arise and staff may call prn.         Electronically signed by Aniya Arora RN on 5/23/2024 at 9:30 AM             Palliative Care Office: [phone]

## 2024-05-23 NOTE — CARE COORDINATION
5/23/24, 10:37 AM EDT    DISCHARGE PLANNING EVALUATION       Spoke with patient and children at the bedside. They prefer [facility], [facility] and then [facility].  Referral made to [facility].

## 2024-05-23 NOTE — PROGRESS NOTES
[facility]  INPATIENT OCCUPATIONAL THERAPY  STRZ ONC MED 5K  EVALUATION    Time:   Time In: 1406  Time Out: 1434  Timed Code Treatment Minutes: 19 Minutes  Minutes: 28          Date: 2024  Patient Name: Leticia Eason,   Gender: female      MRN: [id number]  : 1931  (89 y.o.)  Referring Practitioner: KARTHIKEYAN Ruff-CNP  Diagnosis: multiple fractures of pelvis without disruption of pelvic ring  Additional Pertinent Hx: per chart review; \"Ms. Eason is a 89 yo F who resides independently at an apartment. Her daughter went to check on her this morning and found her down on the floor of the bathroom, unsure what time she fell. Patient has no complaints when examined. She is pleasantly confused- when asked what her name is, she gives her daughter's name. She is unable to identify where she is and has no orientation to time. Per family at bedside, this is unusual, as she is usually A/O x 4.      She did not meet criteria for a trauma alert at her presentation. Her work up included multiple CT and xrays. CT head was negative for acute intracranial process.  CT chest demonstrated no acute intrathoracic findings but a stable right midlung nodule. CT C-spine revealed no acute fractures. CT T and L-spine revealed acute compression fractures of L1 and L3 vertebral bodies, compression fracture of T12 and T9 with 80% compression, T8 and T10 with 30% compression, possible left 8th rib fracture, and possible post traumatic changes of the 8-9th costovertebral junctions on the right side. CT abd/pelvis revealed a comminuted mildly displaced fracture of the left superior and inferior pubic rami, a non-displaced fracture of left iliac bone near the left sacroiliac joint, intramuscular edema in the left proximal adductor muscle compartment with no discrete fluid collection identified. Intramuscular edema noted along the left pelvic sidewall musculature. No free fluid or fluid collections noted within  education & training, Pain management, Self-Care / ADL, Coordination training.  See long-term goal time frame for expected duration of plan of care.  If no long-term goals established, a short length of stay is anticipated.    Goals:  Patient goals : return home [facility]  Short Term Goals  Time Frame for Short Term Goals: by discharge  Short Term Goal 1: OTR to assess stand pivot transfers and functional mobility and create goal as appropriate.  Short Term Goal 2: patient will tolerate 3 min functional standing with sit to stand and participate with one hand release with CGA to increase ease with toileting.  Short Term Goal 3: patient will demo (F+) unsupported dyn sitting balance x 15 min to increase ease with EOB ADL routine and increase tolerance to prep for sit to stand transfers.  Short Term Goal 4: patient will complete UB ADLs with SBA and LB ADLs with MOD A, use of AE PRN and 0-1 cues for spinal precautions.    AM-PAC Inpatient Daily Activity Raw Score: 16  AM-PAC Inpatient ADL T-Scale Score : 35.96    Following session, patient left in safe position with all fall risk precautions in place.

## 2024-05-23 NOTE — PROGRESS NOTES
Respiratory Care is following the rib fracture order set. Patient's position when testing was done was [place].  A Negative Inspiratory Force (NIF) was performed with patient achieving a NIF of -13 cm H2O. The NIF was less than 25 cm H2O. A Slow Vital Capacity (SVC) was obtained with patient achieving an SVC of 0.98 liters. The patient's calculated ideal body weight, (IBW) is  54.6 kg. 0.020 liters/kg of the patient's IBW is 1.09 liters.  Based on the spirometry measurement alone, patient does not meet ICU admission criteria.

## 2024-05-23 NOTE — PLAN OF CARE
Problem: Safety - Adult  Goal: Free from fall injury  Outcome: Progressing  Fall assessment completed. Patient using call light appropriately to call for assistance with ambulation to bathroom.  Personal items within reach. Patient is also compliant with use of non-skid slippers.      Problem: ABCDS Injury Assessment  Goal: Absence of physical injury  Outcome: Progressing  No falls noted this shift. Patient ambulates with x2 staff assistance without difficulty. Family member at bedside, spent the day. Bed kept in low position. Safe environment maintained. Bedside table & call light in reach. Uses call light appropriately when needing assistance.      Problem: Skin/Tissue Integrity  Goal: Absence of new skin breakdown  Description: 1.  Monitor for areas of redness and/or skin breakdown  2.  Assess vascular access sites hourly  3.  Every 4-6 hours minimum:  Change oxygen saturation probe site  4.  Every 4-6 hours:  If on nasal continuous positive airway pressure, respiratory therapy assess nares and determine need for appliance change or resting period.  Outcome: Progressing  No skin breakdown this shift. Patient being assisted with turning. Patients states understanding of repositioning every two hours.     Problem: Discharge Planning  Goal: Discharge to home or other facility with appropriate resources  Outcome: Progressing  Discharge plan is in process. Plan discharge to [facility].     Problem: Nutrition Deficit:  Goal: Optimize nutritional status  5/23/2024 1933 by Micaela Carbone, RN  Outcome: Progressing  Patients appetite good.  Continuing to encourage fluids.     Problem: Pain  Goal: Verbalizes/displays adequate comfort level or baseline comfort level  Outcome: Progressing  Patient states pain relief from PRN pain medications. Pain reassessed one hour post PRN pain medication given.  Patient rates pain 3 on CASA 0-10 scale.     Problem: Musculoskeletal - Adult  Goal: Return mobility to safest level of

## 2024-05-23 NOTE — PROGRESS NOTES
[facility]  SPEECH THERAPY  STRZ ONC MED 5K  Modified Barium Swallow    SLP Individual Minutes  Time In: 930  Time Out: 1002  Minutes: 32  Timed Code Treatment Minutes: 0 Minutes       DIET ORDER RECOMMENDATIONS AFTER EVALUATION: Minced and Moist Diet and Mildly Thick Liquids (NO straws, SINGLE bites/sips, ALTERNATE bites/sips)    Date: 2024  Patient Name: Leticia Eason      CSN: [id number]   : 1931  (89 y.o.)  Gender: female   Referring Physician:  Raya Wilkes MD  Diagnosis: Multiple fractures of pelvis without disruption of pelvic ring, initial encounter for closed fracture (HCC)   Precautions: Fall precautions   History of Present Illness/Injury: Patient  admitted to [facility] with aforementioned diagnosis. Per physician H&P, \"Ms. Eason is a 89 yo F who resides independently at an apartment. Her daughter went to check on her this morning and found her down on the floor of the bathroom, unsure what time she fell. Patient has no complaints when examined. She is pleasantly confused- when asked what her name is, she gives her daughter's name. She is unable to identify where she is and has no orientation to time. Per family at bedside, this is unusual, as she is usually A/O x 4.      She did not meet criteria for a trauma alert at her presentation. Her work up included multiple CT and xrays. CT head was negative for acute intracranial process.  CT chest demonstrated no acute intrathoracic findings but a stable right midlung nodule. CT C-spine revealed no acute fractures. CT T and L-spine revealed acute compression fractures of L1 and L3 vertebral bodies, compression fracture of T12 and T9 with 80% compression, T8 and T10 with 30% compression, possible left 8th rib fracture, and possible post traumatic changes of the 8-9th costovertebral junctions on the right side. CT abd/pelvis revealed a comminuted mildly displaced fracture of the left superior and inferior pubic rami, a non-displaced  Aspiration of one consistency, no cough and airway penetration with one consistency, no cough    EVIDENCE FOR LARYNGEAL PENETRATION AND/OR ASPIRATION:  Laryngeal penetration evident with thin liquids via cup/straw and mildly thick liquids via cup  Audible aspiration evident with thin liquids via cup    PENETRATION-ASPIRATION SCALE (PAS):  Thin Liquids: 7 = Material enters the airway, passes below the vocal folds, and is not ejected from the trachea despite effort  Mildly Thick Liquids:  3 = Material enters the airway, remains above the vocal folds, and is not ejected from the airway  Puree:  3 = Material enters the airway, remains above the vocal folds, and is not ejected from the airway  Soft Solid:  1 = Material does not enter the airway  Mixed Consistencies: 1 = Material does not enter the airway    ESOPHAGEAL PHASE:   No significant findings    ATTEMPTED TECHNIQUES:  Small Bolus Size Effective    Straw Ineffective    Cup Effective    Large Drinks Not Attempted    Consecutive Drinks Not Attempted    Chin Tuck Ineffective    Head Turn Not Attempted    Spoon Presentations Not Attempted    Volitional Cough Ineffective    Spontaneous Cough Ineffective           DIAGNOSTIC IMPRESSIONS:  Patient presents with moderate oral-pharyngeal dysphagia, as evidenced by above skilled level findings. Patient demonstrates impaired/prolonged mastication leading to ineffective textural breakdown. Reduced bolus control/formation also observed, leading to significantly increased AP oral transit time for solid textures. Premature spillage of bolus to vallecula for all consistencies. In regard to pharyngeal integrity, patient presents with reduced hyolaryngeal excursion, leading to reduced epiglottic inversion and thyrohyoid approximation. Mildly reduced TBR and pharyngeal constriction also observed, resulting in accumulation of residue in pharynx. Laryngeal penetration for thin liquids via cup BEFORE, DURING, and AFTER the swallow,

## 2024-05-24 ENCOUNTER — APPOINTMENT (OUTPATIENT)
Dept: GENERAL RADIOLOGY | Age: 89
End: 2024-05-24
Payer: MEDICARE

## 2024-05-24 ENCOUNTER — APPOINTMENT (OUTPATIENT)
Dept: CT IMAGING | Age: 89
End: 2024-05-24
Payer: MEDICARE

## 2024-05-24 VITALS
SYSTOLIC BLOOD PRESSURE: 81 MMHG | WEIGHT: 119 LBS | DIASTOLIC BLOOD PRESSURE: 63 MMHG | BODY MASS INDEX: 21.9 KG/M2 | HEIGHT: 62 IN | TEMPERATURE: 98.7 F | OXYGEN SATURATION: 80 %

## 2024-05-24 PROBLEM — R41.82 ALTERED MENTAL STATUS: Status: ACTIVE | Noted: 2024-05-24

## 2024-05-24 PROBLEM — J96.01 ACUTE HYPOXIC RESPIRATORY FAILURE (HCC): Status: ACTIVE | Noted: 2024-05-24

## 2024-05-24 LAB
ANION GAP SERPL CALC-SCNC: 13 MEQ/L (ref 8–16)
ARTERIAL PATENCY WRIST A: POSITIVE
BASE EXCESS BLDA CALC-SCNC: -8.1 MMOL/L (ref -2.5–2.5)
BASOPHILS ABSOLUTE: 0 THOU/MM3 (ref 0–0.1)
BASOPHILS NFR BLD AUTO: 0.3 %
BDY SITE: ABNORMAL
BUN SERPL-MCNC: 21 MG/DL (ref 7–22)
CA-I BLD ISE-SCNC: 1.15 MMOL/L (ref 1.12–1.32)
CA-I BLD ISE-SCNC: 1.17 MMOL/L (ref 1.12–1.32)
CALCIUM SERPL-MCNC: 8 MG/DL (ref 8.5–10.5)
CHLORIDE BLD-SCNC: 116 MEQ/L (ref 98–109)
CHLORIDE SERPL-SCNC: 112 MEQ/L (ref 98–111)
CO2 SERPL-SCNC: 17 MEQ/L (ref 23–33)
COLLECTED BY:: ABNORMAL
CREAT SERPL-MCNC: 0.8 MG/DL (ref 0.4–1.2)
DEPRECATED RDW RBC AUTO: 51.5 FL (ref 35–45)
DEVICE: ABNORMAL
EKG ATRIAL RATE: 97 BPM
EKG P AXIS: 36 DEGREES
EKG P-R INTERVAL: 148 MS
EKG Q-T INTERVAL: 364 MS
EKG QRS DURATION: 92 MS
EKG QTC CALCULATION (BAZETT): 462 MS
EKG R AXIS: -40 DEGREES
EKG T AXIS: 100 DEGREES
EKG VENTRICULAR RATE: 97 BPM
EOSINOPHIL NFR BLD AUTO: 0.1 %
EOSINOPHILS ABSOLUTE: 0 THOU/MM3 (ref 0–0.4)
ERYTHROCYTE [DISTWIDTH] IN BLOOD BY AUTOMATED COUNT: 14.8 % (ref 11.5–14.5)
FIO2 ON VENT O2 ANALYZER: 5 %
GFR SERPL CREATININE-BSD FRML MDRD: 69 ML/MIN/1.73M2
GLUCOSE BLD STRIP.AUTO-MCNC: 212 MG/DL (ref 70–108)
GLUCOSE BLD-MCNC: 191 MG/DL (ref 70–108)
GLUCOSE SERPL-MCNC: 198 MG/DL (ref 70–108)
HCO3 BLDA-SCNC: 17 MMOL/L (ref 23–28)
HCT VFR BLD AUTO: 26.4 % (ref 37–47)
HGB BLD-MCNC: 8.4 GM/DL (ref 12–16)
IMM GRANULOCYTES # BLD AUTO: 0.05 THOU/MM3 (ref 0–0.07)
IMM GRANULOCYTES NFR BLD AUTO: 0.7 %
LACTATE BLD-SCNC: 0.9 MMOL/L (ref 0.5–1.9)
LACTATE SERPL-SCNC: 1.5 MMOL/L (ref 0.5–2)
LYMPHOCYTES ABSOLUTE: 0.7 THOU/MM3 (ref 1–4.8)
LYMPHOCYTES NFR BLD AUTO: 10.2 %
MAGNESIUM SERPL-MCNC: 2 MG/DL (ref 1.6–2.4)
MCH RBC QN AUTO: 30.7 PG (ref 26–33)
MCHC RBC AUTO-ENTMCNC: 31.8 GM/DL (ref 32.2–35.5)
MCV RBC AUTO: 96.4 FL (ref 81–99)
MONOCYTES ABSOLUTE: 0.4 THOU/MM3 (ref 0.4–1.3)
MONOCYTES NFR BLD AUTO: 5.4 %
NEUTROPHILS ABSOLUTE: 5.8 THOU/MM3 (ref 1.8–7.7)
NEUTROPHILS NFR BLD AUTO: 83.3 %
NRBC BLD AUTO-RTO: 0 /100 WBC
PCO2 BLDA: 33 MMHG (ref 35–45)
PH BLDA: 7.32 [PH] (ref 7.35–7.45)
PHOSPHATE SERPL-MCNC: 2.1 MG/DL (ref 2.4–4.7)
PLATELET # BLD AUTO: 158 THOU/MM3 (ref 130–400)
PMV BLD AUTO: 10.5 FL (ref 9.4–12.4)
PO2 BLDA: 76 MMHG (ref 71–104)
POC CREATININE WHOLE BLOOD: 0.8 MG/DL (ref 0.5–1.2)
POTASSIUM BLD-SCNC: 3.7 MEQ/L (ref 3.5–4.9)
POTASSIUM SERPL-SCNC: 3.7 MEQ/L (ref 3.5–5.2)
RBC # BLD AUTO: 2.74 MILL/MM3 (ref 4.2–5.4)
SAO2 % BLDA: 94 %
SODIUM BLD-SCNC: 144 MEQ/L (ref 138–146)
SODIUM SERPL-SCNC: 142 MEQ/L (ref 135–145)
WBC # BLD AUTO: 7 THOU/MM3 (ref 4.8–10.8)

## 2024-05-24 PROCEDURE — 82947 ASSAY GLUCOSE BLOOD QUANT: CPT

## 2024-05-24 PROCEDURE — 84100 ASSAY OF PHOSPHORUS: CPT

## 2024-05-24 PROCEDURE — 82565 ASSAY OF CREATININE: CPT

## 2024-05-24 PROCEDURE — 84132 ASSAY OF SERUM POTASSIUM: CPT

## 2024-05-24 PROCEDURE — 80048 BASIC METABOLIC PNL TOTAL CA: CPT

## 2024-05-24 PROCEDURE — 83605 ASSAY OF LACTIC ACID: CPT

## 2024-05-24 PROCEDURE — 74018 RADEX ABDOMEN 1 VIEW: CPT

## 2024-05-24 PROCEDURE — 31720 CLEARANCE OF AIRWAYS: CPT

## 2024-05-24 PROCEDURE — 82435 ASSAY OF BLOOD CHLORIDE: CPT

## 2024-05-24 PROCEDURE — 71045 X-RAY EXAM CHEST 1 VIEW: CPT

## 2024-05-24 PROCEDURE — 36415 COLL VENOUS BLD VENIPUNCTURE: CPT

## 2024-05-24 PROCEDURE — 82330 ASSAY OF CALCIUM: CPT

## 2024-05-24 PROCEDURE — 82948 REAGENT STRIP/BLOOD GLUCOSE: CPT

## 2024-05-24 PROCEDURE — 74176 CT ABD & PELVIS W/O CONTRAST: CPT

## 2024-05-24 PROCEDURE — 6360000002 HC RX W HCPCS

## 2024-05-24 PROCEDURE — 85025 COMPLETE CBC W/AUTO DIFF WBC: CPT

## 2024-05-24 PROCEDURE — 2700000000 HC OXYGEN THERAPY PER DAY

## 2024-05-24 PROCEDURE — 93005 ELECTROCARDIOGRAM TRACING: CPT | Performed by: STUDENT IN AN ORGANIZED HEALTH CARE EDUCATION/TRAINING PROGRAM

## 2024-05-24 PROCEDURE — 6360000002 HC RX W HCPCS: Performed by: NURSE PRACTITIONER

## 2024-05-24 PROCEDURE — 82803 BLOOD GASES ANY COMBINATION: CPT

## 2024-05-24 PROCEDURE — 36600 WITHDRAWAL OF ARTERIAL BLOOD: CPT

## 2024-05-24 PROCEDURE — 84295 ASSAY OF SERUM SODIUM: CPT

## 2024-05-24 PROCEDURE — 6370000000 HC RX 637 (ALT 250 FOR IP): Performed by: NURSE PRACTITIONER

## 2024-05-24 PROCEDURE — 83735 ASSAY OF MAGNESIUM: CPT

## 2024-05-24 PROCEDURE — 99291 CRITICAL CARE FIRST HOUR: CPT

## 2024-05-24 PROCEDURE — 99233 SBSQ HOSP IP/OBS HIGH 50: CPT | Performed by: STUDENT IN AN ORGANIZED HEALTH CARE EDUCATION/TRAINING PROGRAM

## 2024-05-24 RX ORDER — ONDANSETRON 2 MG/ML
4 INJECTION INTRAMUSCULAR; INTRAVENOUS EVERY 6 HOURS PRN
Status: DISCONTINUED | OUTPATIENT
Start: 2024-05-24 | End: 2024-05-24 | Stop reason: SDUPTHER

## 2024-05-24 RX ORDER — LOPERAMIDE HYDROCHLORIDE 2 MG/1
2 CAPSULE ORAL PRN
Status: DISCONTINUED | OUTPATIENT
Start: 2024-05-24 | End: 2024-05-24 | Stop reason: HOSPADM

## 2024-05-24 RX ORDER — MORPHINE SULFATE 2 MG/ML
2 INJECTION, SOLUTION INTRAMUSCULAR; INTRAVENOUS
Status: DISCONTINUED | OUTPATIENT
Start: 2024-05-24 | End: 2024-05-24 | Stop reason: HOSPADM

## 2024-05-24 RX ORDER — MORPHINE SULFATE 4 MG/ML
4 INJECTION, SOLUTION INTRAMUSCULAR; INTRAVENOUS
Status: DISCONTINUED | OUTPATIENT
Start: 2024-05-24 | End: 2024-05-24 | Stop reason: HOSPADM

## 2024-05-24 RX ORDER — GLYCOPYRROLATE 0.2 MG/ML
0.2 INJECTION INTRAMUSCULAR; INTRAVENOUS EVERY 4 HOURS PRN
Status: DISCONTINUED | OUTPATIENT
Start: 2024-05-24 | End: 2024-05-24 | Stop reason: HOSPADM

## 2024-05-24 RX ORDER — HALOPERIDOL 5 MG/ML
2 INJECTION INTRAMUSCULAR EVERY 6 HOURS PRN
Status: DISCONTINUED | OUTPATIENT
Start: 2024-05-24 | End: 2024-05-24 | Stop reason: HOSPADM

## 2024-05-24 RX ORDER — ONDANSETRON 4 MG/1
4 TABLET, ORALLY DISINTEGRATING ORAL EVERY 6 HOURS PRN
Status: DISCONTINUED | OUTPATIENT
Start: 2024-05-24 | End: 2024-05-24 | Stop reason: HOSPADM

## 2024-05-24 RX ORDER — BISACODYL 5 MG/1
5 TABLET, DELAYED RELEASE ORAL DAILY PRN
Status: DISCONTINUED | OUTPATIENT
Start: 2024-05-24 | End: 2024-05-24 | Stop reason: SDUPTHER

## 2024-05-24 RX ADMIN — TRAMADOL HYDROCHLORIDE 50 MG: 50 TABLET ORAL at 02:01

## 2024-05-24 RX ADMIN — CIPROFLOXACIN 400 MG: 400 INJECTION, SOLUTION INTRAVENOUS at 06:01

## 2024-05-24 RX ADMIN — MORPHINE SULFATE 2 MG: 2 INJECTION, SOLUTION INTRAMUSCULAR; INTRAVENOUS at 10:31

## 2024-05-24 ASSESSMENT — PAIN SCALES - GENERAL: PAINLEVEL_OUTOF10: 8

## 2024-05-24 ASSESSMENT — PAIN - FUNCTIONAL ASSESSMENT: PAIN_FUNCTIONAL_ASSESSMENT: ACTIVITIES ARE NOT PREVENTED

## 2024-05-24 ASSESSMENT — PAIN DESCRIPTION - DESCRIPTORS: DESCRIPTORS: ACHING

## 2024-05-24 ASSESSMENT — PAIN DESCRIPTION - LOCATION: LOCATION: ABDOMEN

## 2024-05-24 NOTE — PROGRESS NOTES
0350 - Rapid Response called, pt found/non-responsive, RR @ 40, O2 sat 2 80% on RA, lung sounds congested, crackles t/o, BG @ 212, abdomen distended, + abdominal hernia    0352 - O2 applied @ 5L/NC, 02 sat up to 96%, HR 92, Rectal temp @ 98.1  0353 - PCXR completed, result - stool extended to cluster  0355 - /98, ABG ordered, rainbow labs ordered, lactic acid, mag/phos/I kamran ordered  0400 - House Supervisor spoke to pt's daughter & code status clarified, limited no x4, EKG completed - no changes from previous EKG    0403 - 02 sat 87% on 5L/NC  0404 - Non-rebreather mask applied @ 15L/NC, O2 sat up to 93%, HR 93  0410 - NGT placed,   0419 - 02 sat 94% on non-rebreather mask, KUB completed, result NG coiled, Plan: IR can do in morning.    0420 - NG removed, O2 sat 92% on non-rebreather mask  0425 - Transfer to Step down orders obtained  0432 - Family members at bedside.  0440 - Transferred to Room 3A09.

## 2024-05-24 NOTE — PALLIATIVE CARE
Follow Up / Progress Note        Patient:   Leticia Eason  YOB: 1931  Age:  89 y.o.  Room:  [facility]09/[facility]  MRN:  [id number]         Family/Patient Discussion:  Met with family at the bedside.  Patient is tachypneic and unresponsive.  Multiple family members at the bedside.  Emotional support provided.  Family is receptive to hospice to address comfort needs.  Family requests \"last rights\" for the patient.  Much emotional support provided.      Plan/Follow-Up:  Hospice called and they have already been notified of the consult.  Courtesy cart has already been ordered by primary RN.  Spiritual care notified and Fr. Choudhury will be available after 11 AM. Updated Dr. Tran of patient condition and order received for transfer to  with likely gip admission.  Order placed.         Electronically signed by Aniya Arora, ROSA on 5/24/2024 at 11:03 AM             Palliative Care Office: [phone]

## 2024-05-24 NOTE — DEATH NOTES
[facility]  Notice of Patient Passing      Patient Name- Leticia Eason   Acct Number- [id number]   Attending Physician- Raya Wilkes MD    Admitted on-5/21/2024  1:23 PM     On 5/24/2024 at 1205 patient was found in 3A09 with:   Absence of vital signs.   Absence of neurological response.    Confirmed time of death at 1205.   Physician or On-call Physician notified of time of death- yes    Family present at time of death- yes, multiple   Spiritual care present at time of death- no    Physician was notified and orders were obtained to release the body.   Post-Mortem documentation completed; form printed, signed, and given to admitting.    Wendy Mcarthur RN RN Nursing Supervisor/ Manager  5/24/24   12:50 PM    ________________________________________________________________________    Complete information below if 's Case only:    Death Notification    Reported ________________   [facility]’s Office  ’s Case __________   Internal Use Only   Autopsy       Y ____    N____  ’s & Investigator’s Notes  Agency    ________________  FAX:  [phone]   Agency #   _______________     Call Date: [id number]   Call Time: 1245  Notified by: Wendy Mcarthur   Of: [facility]     Type of Death:   [x] Notification  []Hospice  []Hospital < 24 Hour  [] ED Death  []Home  []Nursing Home      Pt Name: Leticia Eason   Address: [address] [place] [address]  Phone: [phone] (home)    SSN:     MRN: [id number]    AGE: 89 y.o.   YOB: 1931       GENDER: female     Primary Care Physician: Jose Hunt MD   Attending Physician Name: Dr. Wilkes    Date of Death: 05/24/24  Time of Death: 1205  Pronounced By: NARDA JaneN-RN, House Supervisor      Emergency Contact:   Name of Family Notified of Death: Elisha Martinez   Relationship to Patient: Daughter   Phone Number: [phone]  Address of Next of Kin:      Cause of Death: Respiratory Failure    Co-Morbidities:  Patient Active Problem List   Diagnosis    Anxiety    Osteoarthritis    Depression    GERD (gastroesophageal reflux disease)    Hyperlipidemia    Osteoporosis    Neuropathy, peripheral    Vitamin D deficiency    Hypothyroid    Senile osteoporosis    Age-related osteoporosis without current pathological fracture    Chronic midline low back pain without sciatica    Gastric ulcer    Incisional hernia of anterior abdominal wall without obstruction or gangrene    Neurogenic bladder    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)    Recurrent major depressive disorder, in partial remission (HCC)    SI (sacroiliac) joint inflammation (HCC)    Chronic renal disease, stage III (HCC) [894603]    Multiple closed fractures of pelvis without disruption of pelvic ring with routine healing    Compression fracture of body of thoracic vertebra (HCC)    Closed fracture of one rib of left side with routine healing    Multiple fractures of pelvis without disruption of pelvic ring, initial encounter for closed fracture (HCC)    Severe malnutrition (HCC)    Acute hypoxic respiratory failure (HCC)    Altered mental status       Comments:             Who Will Sign Death Certificate:         [x] : Dr. Nicola Man

## 2024-05-24 NOTE — CARE COORDINATION
5/24/24, 8:11 AM EDT    DISCHARGE PLANNING EVALUATION     Patient has been accepted by [facility] and is able to discharge when  stable and they are able to accept over the holiday weekend. Blue packet completed and placed on chart along with ambulance transport forms.

## 2024-05-24 NOTE — PROGRESS NOTES
This  attempted to [place] back and follow up with the Leticia Eason family to provide additional spiritual and emotional support for the passing death of the patient. Fr. Choudhury provided the [employer]ism rite of end of life last rite and anointing which patient was actively dying. Family appreciated the care and support they have received. They shared with me that they informed the nurse about the  home they selected for the  to be taken to. They informed me of their appreciation for the care and support provided to them.  Our prayer continue with them as they mourn and prepare for a  service to celebrate the life of their loved one.

## 2024-05-24 NOTE — CARE COORDINATION
Patient was a rapid response and transferred from  to . Hand-off to , DAVIE Sandra. Electronically signed by Tami Torres RN on 5/24/24 at 9:14 AM EDT

## 2024-05-24 NOTE — PROGRESS NOTES
Spoke with patient daughter Elisha. Updated with patient not responding to staff. Code status reviewed. Daughter stated the patient \"did not want CPR\" was afraid they would break all of her ribs. Reviewed placing the patient on life support if needed. Daughter stated \"No\" to pt being intubated and not to place on life support. MD updated.

## 2024-05-24 NOTE — SIGNIFICANT EVENT
I had extensive discussion with patients POA.  She is breathing 40/min has a respiratory acidosis and her health is declining.  At this time family opted for comfort care measures.  Will change CODE STATUS to comfort care and put in comfort care orders.    Resuscitation/Code Status Note on Leticia Eason (YOB: 1931)    At 909 a.m. on May 24, 2024, resuscitation/code status decision was based on a thorough discussion with the patient's legal guardian -   .  The code status was made DNR-CC No additional code details.    Electronically signed by Jurgen Llanos DO on 5/24/24 at 9:09 AM EDT    
    Attending service has been update via [employer].     Total time spent, 45 minutes.     Yvette Blue, KARTHIKEYAN - CNP

## 2024-05-24 NOTE — PROGRESS NOTES
Diffuse osteopenia and kyphosis. 2. Compression fractures involving the T12 and T9 vertebral bodies with 80% compression, new since previous MRI scan dated 12/24/2013. 3. Compression deformity involving the T8 10 vertebral body with 30% compression new since previous MRI scan.. 4. Possible posttraumatic changes involving the eighth and ninth costovertebral junctions on the right side. 5. Possible fracture involving the eighth rib on the left side. **This report has been created using voice recognition software. It may contain minor errors which are inherent in voice recognition technology.**     CT LUMBAR RECONSTRUCTION WO POST PROCESS    Result Date: 5/21/2024  PROCEDURE: CT LUMBAR RECONSTRUCTION WO POST PROCESS CLINICAL INFORMATION: trauma. COMPARISON: MRI scan of the lumbar spine dated 12/24/2013.. TECHNIQUE: 3 mm axial CT images were reconstructed through the lumbar spine. These are reconstructed from the patient's abdomen and pelvis CT. IV contrast is present.  Sagittal and coronal reconstructions were obtained. All CT scans at this facility use dose modulation, iterative reconstruction, and/or weight-based dosing when appropriate to reduce radiation dose to as low as reasonably achievable. FINDINGS: The lumbar vertebral bodies are normally aligned.  There are remote compression fractures involving the L1 and L3 vertebral bodies. There is a compression fracture involving the T12 vertebral body with 80% compression new since previous MRI scan dated 24 December 2013..  No pars defects are noted. There is diffuse osteopenia. The posterior elements are within appropriate limits.  On the axial images, at T10-11 and T11-12, there is no disc herniation, canal or foraminal stenosis. At T12-L1, there is mild canal and mild to moderate bilateral foraminal stenosis. At L1-2, there is mild canal and mild to moderate bilateral foraminal stenosis. At L2-3, there is mild to moderate canal and moderate bilateral foraminal  joints. There is a chronic deformity of the humeral head. Bones are osteopenic.     No acute fracture or dislocation.      20 Minutes spent in patient care collectively between subjective/objective examination, chart review, documentation, clinical reasoning and discussion with attending regarding plan/interval changes.    Electronically signed by KARTHIKEYAN Syed CNP on 5/24/2024 at 7:03 AM

## 2024-05-24 NOTE — PROGRESS NOTES
At 1205 Patient is absent of breath sounds, cardiac sounds and asystole on monitor. Verified by 2nd RN Tiffany Bhat RN. Family at bs,  contacted, house supervisor notified, and attending physician notified.

## 2024-05-24 NOTE — PROGRESS NOTES
the lower esophageal level. Advanced gastric and bowel distention consistent with ileus vs obstruction. This document has been electronically signed by: Marin Farah MD on 05/24/2024 04:51 AM    XR CHEST PORTABLE    Result Date: 5/24/2024  Exam: 1V chest Comparison: 5/24/2024 01:08 AM EDT: CR,SR: XR CHEST PORTABLE (12:08 AM CDT) Findings: Mediastinum: Mild cardiac silhouette enlargement. Lungs: Stable prominent reticular densities bilaterally progressed concerning for interstitial infiltrates vs interstitial edema. Pleura: No pneumothorax or significant effusion. Bones: No acute pathology. Dilated bowel again noted.     Impression: Stable. This document has been electronically signed by: Marin Farah MD on 05/24/2024 04:48 AM    XR CHEST PORTABLE    Result Date: 5/24/2024  Exam: 1V chest Comparison: CR/SR - XR CHEST PORTABLE - 05/23/2024 12:48 AM EDT Findings: Mediastinum: Mild cardiac silhouette enlargement. Lungs: Diffuse prominent reticular densities bilaterally progressed concerning for interstitial infiltrates vs interstitial edema. Pleura: No pneumothorax or significant effusion. Bones: No acute pathology. Visible bowel loops again diffusely distended.     Impression: Diffuse prominent reticular densities bilaterally progressed concerning for interstitial infiltrates vs interstitial edema. Visible bowel loops again diffusely distended. This document has been electronically signed by: Marin Farah MD on 05/24/2024 03:40 AM    FL MODIFIED BARIUM SWALLOW W VIDEO    Result Date: 5/23/2024  PROCEDURE: FL MODIFIED BARIUM SWALLOW W VIDEO CLINICAL INFORMATION: Dysphagia TECHNIQUE: Fluoroscopy was provided for a modified barium swallowing study performed by speech therapy. With the patient in the lateral position, swallowing mechanism was evaluated using barium of various consistencies. The radiologist was available for the entire examination. Reference air kerma 7.77 mGy. COMPARISON: Modified barium  swallow study 9/19/2022 FINDINGS: Oral, pharyngeal and esophageal structures appear normal. There is laryngeal penetration of thin, mildly thick and pureed barium with aspiration of thin barium.     1. Laryngeal penetration of thin, mildly thick and pureed barium with aspiration of thin barium. 2. Additional recommendations from the speech therapist will follow.     XR CHEST PORTABLE    Result Date: 5/23/2024  1 view chest x-ray Comparison: 05/22/2024 Findings: Lungs are clear without acute infiltrates. Hypoinflation noted with low lung volumes. No pneumothorax. Heart size normal. Left rib fractures not well seen.     Impression: No acute processes This document has been electronically signed by: John Gongora MD on 05/23/2024 03:32 AM    see assessment and plan for discussion of pertinent imaging.       Diet: ADULT DIET; Dysphagia - Minced and Moist; Mildly Thick (Nectar)  ADULT ORAL NUTRITION SUPPLEMENT; Breakfast, Lunch, Dinner; Frozen Oral Supplement  Code Status: DNR-CC      Electronically signed by Jurgen Llanos DO on 5/24/2024 at 1:55 PM    Case was discussed with Attending, Dr. Malone

## 2024-05-24 NOTE — PROGRESS NOTES
End of Life: Pt was unresponsive but was surrounded by her loving family. They wanted last rites and it was performed. They asked for  support when she  and I went back. Prayer was appreciated   24 1125   Encounter Summary   Encounter Overview/Reason  Encounter   Service Provided For Patient and family together   Referral/Consult From Nurse;Family   Support System Children;Family members   Last Encounter  24  (Anointede)   Complexity of Encounter Moderate   Begin Time 1123   End Time  1145   Total Time Calculated 22 min   Spiritual/Emotional needs   Type Spiritual Support   Rituals, Rites and Sacraments   Type Anointing   Grief, Loss, and Adjustments   Type End of Life;Death   Assessment/Intervention/Outcome   Assessment Anxious   Intervention Empowerment     .

## 2024-05-24 NOTE — PROGRESS NOTES
[facility]  OCCUPATIONAL THERAPY MISSED TREATMENT NOTE  STRZ CCU 3A  3A-09/009-A      Date: 2024  Patient Name: Leticia Eason        CSN: [id number]   : 1931  (89 y.o.)  Gender: female   Referring Practitioner: SNEHAL Ruff  Diagnosis: multiple fractures of pelvis without disruption of pelvic ring         REASON FOR MISSED TREATMENT:  per chart review; patient has passed away. OT to discharge patient from caseload.

## 2024-05-24 NOTE — PROGRESS NOTES
Physician Progress Note      PATIENT:               REDDY HERNANDEZ  [facility] #:                  [id number]  :                       1931  ADMIT DATE:       2024 1:23 PM  DISCH DATE:  RESPONDING  PROVIDER #:        MARJAN STAHL          QUERY TEXT:    Manjula Salgado Melanie,    Pt admitted with fall with multiple fractures. Pt noted to have Diffuse   osteopenia & Vit D deficiency . If possible, please document in progress notes   and discharge summary if you are evaluating and/or treating any of the   following:    The medical record reflects the following:  Risk Factors: osteopenia, Vit D deficiency  Clinical Indicators: compression fractures of the L1, L3, T8, T9, T10 and T12   compression fractures, Multiple fractures of pelvis, left 8th rib fx  Treatment: ortho following. Pain management. Multivitamins.    India Peguero BSN, RN  Options provided:  -- Pathological L1, L3, T8, T9, T10 and T12, pelvis, & left 8th rib fx   fracture due to osteopenia following fall which would not usually break a   normal, healthy bone  -- Traumatic  L1, L3, T8, T9, T10 and T12, pelvis, & left 8th rib fracture  -- Other - I will add my own diagnosis  -- Disagree - Not applicable / Not valid  -- Disagree - Clinically unable to determine / Unknown  -- Refer to Clinical Documentation Reviewer    PROVIDER RESPONSE TEXT:    Chronic, L1, L3, T8, T9, T10 and T12 VCF, not due to recent fall    Query created by: Liberty Peguero on 2024 8:00 AM      Electronically signed by:  MARJAN STAHL 2024 6:35 AM

## 2024-05-24 NOTE — PROGRESS NOTES
Ortho Spine  MRI Thoracic Spine  IMPRESSION:     1. Old compression fractures involving the T9, T12 and T10 vertebral bodies.  2. No acute compression fracture.    No acute fractures seen on Lumbar or Thoracic imaging. Kyphoplasty or bracing not warranted.     FAVIO Cortez

## 2024-05-24 NOTE — PROCEDURES
PROCEDURE NOTE  Date: 5/24/2024   Name: Leticia Eason  YOB: 1931    Procedures  12 lead EKG completed. Results handed to Yvette BRIAN .

## 2024-05-29 ENCOUNTER — HOSPITAL ENCOUNTER (OUTPATIENT)
Dept: NURSING | Age: 89
End: 2024-05-29

## 2024-06-02 PROBLEM — R29.6 UNWITNESSED FALL: Status: ACTIVE | Noted: 2024-06-02

## 2025-05-13 NOTE — TELEPHONE ENCOUNTER
Pt called req ATB for UTI. Pt is having urgency,frequency,back cramping, burning with urination.     [address] details…

## (undated) DEVICE — SPONGE DRN W4XL4IN RAYON/POLYESTER 6 PLY NONWOVEN PRECUT

## (undated) DEVICE — 3M™ WARMING BLANKET, UPPER BODY, 10 PER CASE, 42268: Brand: BAIR HUGGER™

## (undated) DEVICE — Device

## (undated) DEVICE — PAD,ABDOMINAL,5"X9",ST,LF,25/BX: Brand: MEDLINE INDUSTRIES, INC.

## (undated) DEVICE — GLOVE SURG SZ 65 THK91MIL LTX FREE SYN POLYISOPRENE

## (undated) DEVICE — SPONGE LAP W18XL18IN WHT COT 4 PLY FLD STRUNG RADPQ DISP ST

## (undated) DEVICE — GLOVE ORANGE PI 7   MSG9070

## (undated) DEVICE — DRAIN CHN 19FR L0.25IN DIA6.3MM SIL RND HUBLESS FULL FLUT

## (undated) DEVICE — POSITIONER HD W8XH4XL8.5IN RASPBERRY FOAM SLT

## (undated) DEVICE — BLANKET THER AD W24XL60IN FAB COVERING SUP SFT ULT THN LTWT

## (undated) DEVICE — COVER ARMBRD W13XL28.5IN IMPERV BLU FOR OP RM

## (undated) DEVICE — GOWN,SIRUS,NONRNF,SETINSLV,XL,20/CS: Brand: MEDLINE

## (undated) DEVICE — GARMENT COMPR STD FOR 17IN CALF UNIF THER FLOTRN

## (undated) DEVICE — BREAST HERNIA PACK: Brand: MEDLINE INDUSTRIES, INC.

## (undated) DEVICE — TOWEL,OR,DSP,ST,WHITE,DLX,XR,4/PK,20PK/C: Brand: MEDLINE

## (undated) DEVICE — BIOGUARD A/W CLEANING ADAPTER

## (undated) DEVICE — CHLORAPREP 26ML ORANGE

## (undated) DEVICE — POOLE SUCTION HANDLE: Brand: CARDINAL HEALTH

## (undated) DEVICE — SPONGE GZ W4XL4IN COT 12 PLY TYP VII WVN C FLD DSGN